# Patient Record
Sex: MALE | Race: BLACK OR AFRICAN AMERICAN | NOT HISPANIC OR LATINO | Employment: UNEMPLOYED | URBAN - METROPOLITAN AREA
[De-identification: names, ages, dates, MRNs, and addresses within clinical notes are randomized per-mention and may not be internally consistent; named-entity substitution may affect disease eponyms.]

---

## 2019-01-18 ENCOUNTER — HOSPITAL ENCOUNTER (OUTPATIENT)
Dept: INTERVENTIONAL RADIOLOGY/VASCULAR | Facility: HOSPITAL | Age: 44
Discharge: HOME | End: 2019-01-18
Admitting: RADIOLOGY
Payer: COMMERCIAL

## 2019-01-18 VITALS
RESPIRATION RATE: 20 BRPM | DIASTOLIC BLOOD PRESSURE: 95 MMHG | OXYGEN SATURATION: 96 % | HEART RATE: 87 BPM | SYSTOLIC BLOOD PRESSURE: 173 MMHG

## 2019-01-18 DIAGNOSIS — T82.858A AV FISTULA STENOSIS, INITIAL ENCOUNTER (HCC): ICD-10-CM

## 2019-01-18 PROCEDURE — C1894 INTRO/SHEATH, NON-LASER: HCPCS

## 2019-01-18 PROCEDURE — C1769 GUIDE WIRE: HCPCS

## 2019-01-18 PROCEDURE — C1725 CATH, TRANSLUMIN NON-LASER: HCPCS

## 2019-01-18 PROCEDURE — 36902 INTRO CATH DIALYSIS CIRCUIT: CPT

## 2019-01-18 PROCEDURE — 36902 INTRO CATH DIALYSIS CIRCUIT: CPT | Performed by: RADIOLOGY

## 2019-01-18 RX ORDER — LIDOCAINE HYDROCHLORIDE 10 MG/ML
INJECTION, SOLUTION INFILTRATION; PERINEURAL CODE/TRAUMA/SEDATION MEDICATION
Status: COMPLETED | OUTPATIENT
Start: 2019-01-18 | End: 2019-01-18

## 2019-01-18 RX ADMIN — NITROGLYCERIN 500 MCG: 20 INJECTION INTRAVENOUS at 17:06

## 2019-01-18 RX ADMIN — IOHEXOL 60 ML: 300 INJECTION, SOLUTION INTRAVENOUS at 17:29

## 2019-01-18 RX ADMIN — LIDOCAINE HYDROCHLORIDE 3 ML: 10 INJECTION, SOLUTION INFILTRATION; PERINEURAL at 16:04

## 2019-01-18 NOTE — DISCHARGE INSTRUCTIONS
Fistulagram   WHAT YOU NEED TO KNOW:   Your arm or leg my  be sore, swollen, and bruised after the procedure  This is normal and should get better in a few days  DISCHARGE INSTRUCTIONS:     Contact Interventional Radiology at [phone] Yoseph PATIENTS: Contact Interventional Radiology at [phone]) Matt Garcia PATIENTS: Contact Interventional Radiology at [phone]) if:    · You have a fever or chills  · Your puncture site is red, swollen, or draining pus  · You have nausea or are vomiting  · Your skin is itchy, swollen, or you have a rash  · You cannot feel a thrill over your graft or fistula  · You have questions or concerns about your condition or care  Seek care immediately if:     · You have bleeding that does not stop after 10 minutes of holding firm, direct pressure over the puncture site  · Blood soaks through your bandage  · Your hand or foot closest to the graft or fistula feels cold, painful, or numb  · Your hand or foot closest to the graft or fistula is pale or blue  · You have trouble moving your arm or leg closest to the graft or fistula  · Your bruise suddenly gets bigger  Care for your wound as directed:  Remove the bandage in 4 to 6 hours or as         directed  Wash the area once a day with soap and water  Gently pat the area dry  Apply firm, steady pressure to the puncture site if it bleeds  Use a clean gauze or towel to hold pressure for 10 to 15 minutes  Call 911 if you cannot stop the bleeding or the bleeding gets heavier  Feel for a thrill once a day or as directed  Place your index and second finger over your fistula or graft as directed  You should feel a vibration  The vibration means that blood is flowing through your graft or fistula correctly  Rest your arm or leg as directed  Do not lift anything heavier than 5 pounds or do strenuous activity for 24 hours  Prevent damage to your graft or fistula    Do not wear tight-fitting clothing over your graft or fistula  Do not wear tight jewelry on the arm or leg with the graft or fistula  Tell healthcare providers not to do, IVs, blood draws, and blood pressure readings in the arm with your graft or fistula  Do not allow flu shots or vaccinations in your arm with your graft or fistula      Follow up with your healthcare provider as directed

## 2019-07-18 ENCOUNTER — TELEPHONE (OUTPATIENT)
Dept: NEPHROLOGY | Facility: CLINIC | Age: 44
End: 2019-07-18

## 2019-07-18 NOTE — TELEPHONE ENCOUNTER
Warren Bundy called from Dr Jen Collins office  [phone]  Pt established his care with him and Dr Sophy Nguyen is now the pt's new PCP  Warren Bundy was calling stated the pt moved locally from [place] and is trying to  be established with a Nephrologist and is requesting an appt with our office as a 3 day urgent  Rebecca Demetrice that the pt is already an established patient at the [facility] for a couple months now and there is a rotation of providers that round monthly at the units  Per Dr Fawn Connors, pt does not need to be seen in the office,  pt is to call the Dialysis unit with any issues or concerns  The nurse would page the on call provider; this month is Dr Juan M Calvillo and if need be Dr Fawn Connors because he is the medical director  Warren Bundy was grateful for the understanding of the process and stated the patient was not forthcoming and will make the patient aware to contact the [facility] [place] [id number] [facility] with any issues or concerns

## 2019-12-18 DIAGNOSIS — R91.8 LUNG INFILTRATE: Primary | ICD-10-CM

## 2019-12-19 ENCOUNTER — HOSPITAL ENCOUNTER (OUTPATIENT)
Dept: RADIOLOGY | Facility: HOSPITAL | Age: 44
Discharge: HOME | End: 2019-12-19
Payer: COMMERCIAL

## 2019-12-19 DIAGNOSIS — R91.8 LUNG INFILTRATE: ICD-10-CM

## 2019-12-19 PROCEDURE — 71046 X-RAY EXAM CHEST 2 VIEWS: CPT

## 2020-03-30 ENCOUNTER — APPOINTMENT (EMERGENCY)
Dept: RADIOLOGY | Facility: HOSPITAL | Age: 45
End: 2020-03-30
Payer: COMMERCIAL

## 2020-03-30 ENCOUNTER — HOSPITAL ENCOUNTER (EMERGENCY)
Facility: HOSPITAL | Age: 45
Discharge: HOME | End: 2020-03-30
Attending: EMERGENCY MEDICINE | Admitting: EMERGENCY MEDICINE
Payer: COMMERCIAL

## 2020-03-30 VITALS
SYSTOLIC BLOOD PRESSURE: 107 MMHG | HEART RATE: 74 BPM | TEMPERATURE: 97.6 F | RESPIRATION RATE: 22 BRPM | BODY MASS INDEX: 32.21 KG/M2 | WEIGHT: 225 LBS | DIASTOLIC BLOOD PRESSURE: 60 MMHG | HEIGHT: 70 IN | OXYGEN SATURATION: 97 %

## 2020-03-30 DIAGNOSIS — R07.89 CHEST WALL PAIN: Primary | ICD-10-CM

## 2020-03-30 DIAGNOSIS — R07.9 CHEST PAIN: ICD-10-CM

## 2020-03-30 LAB
ALBUMIN SERPL BCP-MCNC: 3.7 G/DL (ref 3.5–5)
ALP SERPL-CCNC: 567 U/L (ref 46–116)
ALT SERPL W P-5'-P-CCNC: 15 U/L (ref 12–78)
ANION GAP SERPL CALCULATED.3IONS-SCNC: 16 MMOL/L (ref 4–13)
AST SERPL W P-5'-P-CCNC: 12 U/L (ref 5–45)
ATRIAL RATE: 76 BPM
ATRIAL RATE: 82 BPM
BASOPHILS # BLD AUTO: 0.04 THOUSANDS/ΜL (ref 0–0.1)
BASOPHILS NFR BLD AUTO: 1 % (ref 0–1)
BILIRUB SERPL-MCNC: 0.3 MG/DL (ref 0.2–1)
BUN SERPL-MCNC: 83 MG/DL (ref 5–25)
CALCIUM SERPL-MCNC: 7.9 MG/DL (ref 8.3–10.1)
CHLORIDE SERPL-SCNC: 96 MMOL/L (ref 100–108)
CO2 SERPL-SCNC: 25 MMOL/L (ref 21–32)
CREAT SERPL-MCNC: 19.34 MG/DL (ref 0.6–1.3)
EOSINOPHIL # BLD AUTO: 0.26 THOUSAND/ΜL (ref 0–0.61)
EOSINOPHIL NFR BLD AUTO: 4 % (ref 0–6)
ERYTHROCYTE [DISTWIDTH] IN BLOOD BY AUTOMATED COUNT: 16.3 % (ref 11.6–15.1)
GFR SERPL CREATININE-BSD FRML MDRD: 3 ML/MIN/1.73SQ M
GLUCOSE SERPL-MCNC: 115 MG/DL (ref 65–140)
HCT VFR BLD AUTO: 34.5 % (ref 36.5–49.3)
HGB BLD-MCNC: 10.8 G/DL (ref 12–17)
IMM GRANULOCYTES # BLD AUTO: 0.02 THOUSAND/UL (ref 0–0.2)
IMM GRANULOCYTES NFR BLD AUTO: 0 % (ref 0–2)
LIPASE SERPL-CCNC: 341 U/L (ref 73–393)
LYMPHOCYTES # BLD AUTO: 1.72 THOUSANDS/ΜL (ref 0.6–4.47)
LYMPHOCYTES NFR BLD AUTO: 27 % (ref 14–44)
MCH RBC QN AUTO: 27.3 PG (ref 26.8–34.3)
MCHC RBC AUTO-ENTMCNC: 31.3 G/DL (ref 31.4–37.4)
MCV RBC AUTO: 87 FL (ref 82–98)
MONOCYTES # BLD AUTO: 0.43 THOUSAND/ΜL (ref 0.17–1.22)
MONOCYTES NFR BLD AUTO: 7 % (ref 4–12)
NEUTROPHILS # BLD AUTO: 3.91 THOUSANDS/ΜL (ref 1.85–7.62)
NEUTS SEG NFR BLD AUTO: 61 % (ref 43–75)
NRBC BLD AUTO-RTO: 0 /100 WBCS
P AXIS: 63 DEGREES
P AXIS: 64 DEGREES
PLATELET # BLD AUTO: 199 THOUSANDS/UL (ref 149–390)
PMV BLD AUTO: 10.4 FL (ref 8.9–12.7)
POTASSIUM SERPL-SCNC: 6 MMOL/L (ref 3.5–5.3)
PR INTERVAL: 192 MS
PR INTERVAL: 206 MS
PROT SERPL-MCNC: 8.4 G/DL (ref 6.4–8.2)
QRS AXIS: 19 DEGREES
QRS AXIS: 9 DEGREES
QRSD INTERVAL: 86 MS
QRSD INTERVAL: 90 MS
QT INTERVAL: 398 MS
QT INTERVAL: 416 MS
QTC INTERVAL: 464 MS
QTC INTERVAL: 468 MS
RBC # BLD AUTO: 3.96 MILLION/UL (ref 3.88–5.62)
SODIUM SERPL-SCNC: 137 MMOL/L (ref 136–145)
T WAVE AXIS: 65 DEGREES
T WAVE AXIS: 70 DEGREES
TROPONIN I SERPL-MCNC: <0.02 NG/ML
TROPONIN I SERPL-MCNC: <0.02 NG/ML
VENTRICULAR RATE: 76 BPM
VENTRICULAR RATE: 82 BPM
WBC # BLD AUTO: 6.38 THOUSAND/UL (ref 4.31–10.16)

## 2020-03-30 PROCEDURE — 85025 COMPLETE CBC W/AUTO DIFF WBC: CPT | Performed by: EMERGENCY MEDICINE

## 2020-03-30 PROCEDURE — 93010 ELECTROCARDIOGRAM REPORT: CPT | Performed by: INTERNAL MEDICINE

## 2020-03-30 PROCEDURE — 71046 X-RAY EXAM CHEST 2 VIEWS: CPT

## 2020-03-30 PROCEDURE — 99282 EMERGENCY DEPT VISIT SF MDM: CPT | Performed by: EMERGENCY MEDICINE

## 2020-03-30 PROCEDURE — 84484 ASSAY OF TROPONIN QUANT: CPT | Performed by: EMERGENCY MEDICINE

## 2020-03-30 PROCEDURE — 36415 COLL VENOUS BLD VENIPUNCTURE: CPT | Performed by: EMERGENCY MEDICINE

## 2020-03-30 PROCEDURE — 80053 COMPREHEN METABOLIC PANEL: CPT | Performed by: EMERGENCY MEDICINE

## 2020-03-30 PROCEDURE — 93005 ELECTROCARDIOGRAM TRACING: CPT

## 2020-03-30 PROCEDURE — 83690 ASSAY OF LIPASE: CPT | Performed by: EMERGENCY MEDICINE

## 2020-03-30 PROCEDURE — 99285 EMERGENCY DEPT VISIT HI MDM: CPT

## 2020-03-30 RX ORDER — ASPIRIN 81 MG/1
324 TABLET, CHEWABLE ORAL ONCE
Status: COMPLETED | OUTPATIENT
Start: 2020-03-30 | End: 2020-03-30

## 2020-03-30 RX ORDER — SODIUM CHLORIDE 9 MG/ML
3 INJECTION INTRAVENOUS
Status: DISCONTINUED | OUTPATIENT
Start: 2020-03-30 | End: 2020-03-30 | Stop reason: HOSPADM

## 2020-03-30 RX ADMIN — ASPIRIN 81 MG 324 MG: 81 TABLET ORAL at 10:01

## 2020-03-30 NOTE — ED PROVIDER NOTES
History  Chief Complaint   Patient presents with    Chest Pain     pt with mid sternal chest pain that started last night, pain does not radiate, worse when laying on back      Patient is a 55-year-old male complaining of midsternal chest tightness since last evening  States symptoms occurred while at rest   No exertional component to the discomfort  Described as a tightness in his mid chest region  Does not radiate  Denies any shortness of breath, headache, fever, chills, nausea, vomiting, diaphoresis  No history of similar symptoms  Patient does home hemodialysis  No recent change in medications  No recent illness or trauma  Denies any palpitations  No history of DVT or PE  No recent surgery or periods of immobilization  No recent antibiotics  None       Past Medical History:   Diagnosis Date    Renal disorder     dialysis        Past Surgical History:   Procedure Laterality Date    IR FISTULA/GRAFTOGRAM  1/18/2019       History reviewed  No pertinent family history  I have reviewed and agree with the history as documented  E-Cigarette/Vaping    E-Cigarette Use Never User      E-Cigarette/Vaping Substances     Social History     Tobacco Use    Smoking status: Never Smoker    Smokeless tobacco: Never Used   Substance Use Topics    Alcohol use: Never     Frequency: Never    Drug use: Never       Review of Systems   Constitutional: Negative for activity change, appetite change, chills and fever  HENT: Negative for hearing loss, rhinorrhea, sneezing, sore throat and trouble swallowing  Eyes: Negative for visual disturbance  Respiratory: Positive for chest tightness  Negative for cough, choking, shortness of breath, wheezing and stridor  Cardiovascular: Negative for chest pain, palpitations and leg swelling  Gastrointestinal: Negative for abdominal pain, constipation, diarrhea, nausea and vomiting     Genitourinary: Negative for difficulty urinating, dysuria, frequency and testicular pain  Musculoskeletal: Negative for back pain, gait problem, joint swelling, myalgias, neck pain and neck stiffness  Skin: Negative for rash  Allergic/Immunologic: Negative for immunocompromised state  Neurological: Negative for dizziness, seizures, syncope, speech difficulty, weakness, light-headedness, numbness and headaches  All other systems reviewed and are negative  Physical Exam  Physical Exam   Constitutional: He is oriented to person, place, and time  He appears well-developed and well-nourished  He does not appear ill  No distress  HENT:   Head: Normocephalic and atraumatic  Mouth/Throat: Oropharynx is clear and moist    Eyes: Conjunctivae and EOM are normal    Neck: Normal range of motion  Neck supple  Cardiovascular: Normal rate, regular rhythm, intact distal pulses and normal pulses  No murmur heard  Pacemaker in the left upper chest wall   Pulmonary/Chest: Effort normal and breath sounds normal  No respiratory distress  He has no wheezes  He has no rales  He exhibits no tenderness  Abdominal: Soft  Bowel sounds are normal  He exhibits no distension and no mass  There is no tenderness  There is no rebound and no guarding  Musculoskeletal: Normal range of motion  He exhibits no edema or tenderness  Right lower leg: Normal         Left lower leg: Normal    Right upper extremity AV fistula with positive thrill and bruit  Lymphadenopathy:     He has no cervical adenopathy  Neurological: He is alert and oriented to person, place, and time  He exhibits normal muscle tone  Skin: Skin is warm and dry  Capillary refill takes less than 2 seconds  No rash noted  No erythema  No zoster rash  No reproducible tenderness  Psychiatric: He has a normal mood and affect  His behavior is normal    Nursing note and vitals reviewed        Vital Signs  ED Triage Vitals [03/30/20 0920]   Temperature Pulse Respirations Blood Pressure SpO2   97 6 °F (36 4 °C) 80 16 118/70 100 %      Temp Source Heart Rate Source Patient Position - Orthostatic VS BP Location FiO2 (%)   Oral Monitor Sitting Right arm --      Pain Score       --           Vitals:    03/30/20 0920 03/30/20 1207 03/30/20 1215   BP: 118/70 106/61 107/60   Pulse: 80 75 74   Patient Position - Orthostatic VS: Sitting Lying Sitting         Visual Acuity      ED Medications  Medications   sodium chloride (PF) 0 9 % injection 3 mL (has no administration in time range)   aspirin chewable tablet 324 mg (324 mg Oral Given 3/30/20 1001)       Diagnostic Studies  Results Reviewed     Procedure Component Value Units Date/Time    Troponin I [[id number]]  (Normal) Collected:  03/30/20 1228    Lab Status:  Final result Specimen:  Blood from Arm, Right Updated:  03/30/20 1255     Troponin I <0 02 ng/mL     Troponin I [[id number]]  (Normal) Collected:  03/30/20 0959    Lab Status:  Final result Specimen:  Blood from Arm, Left Updated:  03/30/20 1028     Troponin I <0 02 ng/mL     Comprehensive metabolic panel [[id number]]  (Abnormal) Collected:  03/30/20 0959    Lab Status:  Final result Specimen:  Blood from Arm, Left Updated:  03/30/20 1026     Sodium 137 mmol/L      Potassium 6 0 mmol/L      Chloride 96 mmol/L      CO2 25 mmol/L      ANION GAP 16 mmol/L      BUN 83 mg/dL      Creatinine 19 34 mg/dL      Glucose 115 mg/dL      Calcium 7 9 mg/dL      AST 12 U/L      ALT 15 U/L      Alkaline Phosphatase 567 U/L      Total Protein 8 4 g/dL      Albumin 3 7 g/dL      Total Bilirubin 0 30 mg/dL      eGFR 3 ml/min/1 73sq m     Narrative:       [place]unn[place] guidelines for Chronic Kidney Disease (CKD):     Stage 1 with normal or high GFR (GFR > 90 mL/min/1 73 square meters)    Stage 2 Mild CKD (GFR = 60-89 mL/min/1 73 square meters)    Stage 3A Moderate CKD (GFR = 45-59 mL/min/1 73 square meters)    Stage 3B Moderate CKD (GFR = 30-44 mL/min/1 73 square meters)    Stage 4 Severe CKD (GFR = 15-29 mL/min/1 73 square meters)    Stage 5 End Stage CKD (GFR <15 mL/min/1 73 square meters)  Note: GFR calculation is accurate only with a steady state creatinine    Lipase [[id number]]  (Normal) Collected:  03/30/20 0959    Lab Status:  Final result Specimen:  Blood from Arm, Left Updated:  03/30/20 1026     Lipase 341 u/L     CBC and differential [[id number]]  (Abnormal) Collected:  03/30/20 0959    Lab Status:  Final result Specimen:  Blood from Arm, Left Updated:  03/30/20 1008     WBC 6 38 Thousand/uL      RBC 3 96 Million/uL      Hemoglobin 10 8 g/dL      Hematocrit 34 5 %      MCV 87 fL      MCH 27 3 pg      MCHC 31 3 g/dL      RDW 16 3 %      MPV 10 4 fL      Platelets 531 Thousands/uL      nRBC 0 /100 WBCs      Neutrophils Relative 61 %      Immat GRANS % 0 %      Lymphocytes Relative 27 %      Monocytes Relative 7 %      Eosinophils Relative 4 %      Basophils Relative 1 %      Neutrophils Absolute 3 91 Thousands/µL      Immature Grans Absolute 0 02 Thousand/uL      Lymphocytes Absolute 1 72 Thousands/µL      Monocytes Absolute 0 43 Thousand/µL      Eosinophils Absolute 0 26 Thousand/µL      Basophils Absolute 0 04 Thousands/µL                  X-ray chest 2 views   Final Result by Abi Orellana MD (03/30 1230)      No acute cardiopulmonary disease              Workstation performed: [id number]                    Procedures  ECG 12 Lead Documentation Only  Date/Time: 3/30/2020 9:37 AM  Performed by: Sylvia Weller DO  Authorized by: Sylvia Weller DO     Indications / Diagnosis:  Chest pain  ECG reviewed by me, the ED Provider: yes    Patient location:  ED  Rate:     ECG rate:  82    ECG rate assessment: normal    Rhythm:     Rhythm: sinus rhythm    Ectopy:     Ectopy: none    QRS:     QRS axis:  Normal    QRS intervals:  Normal  Conduction:     Conduction: normal    ST segments:     ST segments:  Normal  T waves:     T waves: normal      ECG 12 Lead Documentation Only  Date/Time: 3/30/2020 12:14 PM  Performed by: Flavio Jiménez DO Jeffry  Authorized by: Richa Guerrero DO     Indications / Diagnosis:  Chest pain  ECG reviewed by me, the ED Provider: yes    Patient location:  ED  Previous ECG:     Previous ECG:  Compared to current    Comparison to cardiac monitor: Yes    Rate:     ECG rate:  76    ECG rate assessment: normal    Rhythm:     Rhythm: sinus rhythm    Ectopy:     Ectopy: none    QRS:     QRS axis:  Normal    QRS intervals:  Normal  Conduction:     Conduction: normal    ST segments:     ST segments:  Normal  T waves:     T waves: normal               ED Course         HEART Risk Score      Most Recent Value   Heart Score Risk Calculator   History  0 Filed at: 03/30/2020 1055   ECG  0 Filed at: 03/30/2020 1055   Age  0 Filed at: 03/30/2020 1055   Risk Factors  1 Filed at: 03/30/2020 1055   Troponin  0 Filed at: 03/30/2020 1055   HEART Score  1 Filed at: 03/30/2020 1055                              MDM      Disposition  Final diagnoses:   Chest wall pain   Chest pain     Time reflects when diagnosis was documented in both MDM as applicable and the Disposition within this note     Time User Action Codes Description Comment    3/30/2020  1:18 PM Jessica Mercury T Add [R07 89] Chest wall pain     3/30/2020  1:18 PM Jessica Mercury T Add [R07 9] Chest pain       ED Disposition     ED Disposition Condition Date/Time Comment    Discharge Stable Mon Mar 30, 2020  1:18 PM Khai Laboy discharge to home/self care  Follow-up Information     Follow up With Specialties Details Why Contact Info Additional [facility]   [address] [address]  [place] [address] [facility] [address] [place] [address],[address] [place], [place], [address] [phone]          Patient's Medications    No medications on file     No discharge procedures on file      PDMP Review     None          ED Provider  Electronically Signed by           Basilio Anne,   03/30/20 1180

## 2020-06-15 ENCOUNTER — OFFICE VISIT (OUTPATIENT)
Dept: GASTROENTEROLOGY | Facility: CLINIC | Age: 45
End: 2020-06-15
Payer: COMMERCIAL

## 2020-06-15 VITALS
TEMPERATURE: 97.9 F | DIASTOLIC BLOOD PRESSURE: 88 MMHG | SYSTOLIC BLOOD PRESSURE: 166 MMHG | WEIGHT: 239 LBS | BODY MASS INDEX: 34.29 KG/M2

## 2020-06-15 DIAGNOSIS — Z12.11 SCREENING FOR COLON CANCER: Primary | ICD-10-CM

## 2020-06-15 DIAGNOSIS — Z11.59 SPECIAL SCREENING EXAMINATION FOR UNSPECIFIED VIRAL DISEASE: ICD-10-CM

## 2020-06-15 PROCEDURE — 99203 OFFICE O/P NEW LOW 30 MIN: CPT | Performed by: PHYSICIAN ASSISTANT

## 2020-06-15 RX ORDER — HYDRALAZINE HYDROCHLORIDE 50 MG/1
50 TABLET, FILM COATED ORAL
COMMUNITY
Start: 2019-04-24 | End: 2020-07-06 | Stop reason: HOSPADM

## 2020-06-15 RX ORDER — CALCITRIOL 0.5 UG/1
0.5 CAPSULE, LIQUID FILLED ORAL
COMMUNITY
Start: 2016-05-04 | End: 2020-07-06 | Stop reason: HOSPADM

## 2020-06-15 RX ORDER — NIFEDIPINE 60 MG/1
90 TABLET, FILM COATED, EXTENDED RELEASE ORAL
COMMUNITY
Start: 2018-01-19 | End: 2020-07-06 | Stop reason: HOSPADM

## 2020-06-15 RX ORDER — NIFEDIPINE 90 MG/1
90 TABLET, EXTENDED RELEASE ORAL DAILY
COMMUNITY
Start: 2020-05-05 | End: 2021-10-25

## 2020-06-20 DIAGNOSIS — Z11.59 SPECIAL SCREENING EXAMINATION FOR UNSPECIFIED VIRAL DISEASE: ICD-10-CM

## 2020-06-20 PROCEDURE — U0003 INFECTIOUS AGENT DETECTION BY NUCLEIC ACID (DNA OR RNA); SEVERE ACUTE RESPIRATORY SYNDROME CORONAVIRUS 2 (SARS-COV-2) (CORONAVIRUS DISEASE [COVID-19]), AMPLIFIED PROBE TECHNIQUE, MAKING USE OF HIGH THROUGHPUT TECHNOLOGIES AS DESCRIBED BY CMS-2020-01-R: HCPCS

## 2020-06-21 LAB — SARS-COV-2 RNA SPEC QL NAA+PROBE: NOT DETECTED

## 2020-06-23 ENCOUNTER — ANESTHESIA EVENT (OUTPATIENT)
Dept: GASTROENTEROLOGY | Facility: HOSPITAL | Age: 45
End: 2020-06-23

## 2020-06-24 ENCOUNTER — ANESTHESIA (OUTPATIENT)
Dept: GASTROENTEROLOGY | Facility: HOSPITAL | Age: 45
End: 2020-06-24

## 2020-06-24 ENCOUNTER — HOSPITAL ENCOUNTER (OUTPATIENT)
Dept: GASTROENTEROLOGY | Facility: HOSPITAL | Age: 45
Setting detail: OUTPATIENT SURGERY
Discharge: HOME | End: 2020-06-24
Attending: INTERNAL MEDICINE
Payer: COMMERCIAL

## 2020-06-24 VITALS
SYSTOLIC BLOOD PRESSURE: 157 MMHG | OXYGEN SATURATION: 97 % | BODY MASS INDEX: 33.2 KG/M2 | HEART RATE: 98 BPM | RESPIRATION RATE: 15 BRPM | TEMPERATURE: 97.4 F | WEIGHT: 231.92 LBS | DIASTOLIC BLOOD PRESSURE: 91 MMHG | HEIGHT: 70 IN

## 2020-06-24 DIAGNOSIS — Z12.11 SCREENING FOR COLON CANCER: ICD-10-CM

## 2020-06-24 LAB — GLUCOSE SERPL-MCNC: 84 MG/DL (ref 65–140)

## 2020-06-24 PROCEDURE — 45384 COLONOSCOPY W/LESION REMOVAL: CPT | Performed by: INTERNAL MEDICINE

## 2020-06-24 PROCEDURE — 45385 COLONOSCOPY W/LESION REMOVAL: CPT | Performed by: INTERNAL MEDICINE

## 2020-06-24 PROCEDURE — 82948 REAGENT STRIP/BLOOD GLUCOSE: CPT

## 2020-06-24 PROCEDURE — 88305 TISSUE EXAM BY PATHOLOGIST: CPT | Performed by: PATHOLOGY

## 2020-06-24 RX ORDER — LIDOCAINE HYDROCHLORIDE 10 MG/ML
INJECTION, SOLUTION EPIDURAL; INFILTRATION; INTRACAUDAL; PERINEURAL AS NEEDED
Status: DISCONTINUED | OUTPATIENT
Start: 2020-06-24 | End: 2020-06-24 | Stop reason: SURG

## 2020-06-24 RX ORDER — PROPOFOL 10 MG/ML
INJECTION, EMULSION INTRAVENOUS AS NEEDED
Status: DISCONTINUED | OUTPATIENT
Start: 2020-06-24 | End: 2020-06-24 | Stop reason: SURG

## 2020-06-24 RX ORDER — SODIUM CHLORIDE, SODIUM LACTATE, POTASSIUM CHLORIDE, CALCIUM CHLORIDE 600; 310; 30; 20 MG/100ML; MG/100ML; MG/100ML; MG/100ML
125 INJECTION, SOLUTION INTRAVENOUS CONTINUOUS
Status: DISCONTINUED | OUTPATIENT
Start: 2020-06-24 | End: 2020-06-28 | Stop reason: HOSPADM

## 2020-06-24 RX ADMIN — PROPOFOL 20 MG: 10 INJECTION, EMULSION INTRAVENOUS at 09:32

## 2020-06-24 RX ADMIN — PROPOFOL 20 MG: 10 INJECTION, EMULSION INTRAVENOUS at 09:38

## 2020-06-24 RX ADMIN — LIDOCAINE HYDROCHLORIDE 50 MG: 10 INJECTION, SOLUTION EPIDURAL; INFILTRATION; INTRACAUDAL; PERINEURAL at 09:26

## 2020-06-24 RX ADMIN — PROPOFOL 10 MG: 10 INJECTION, EMULSION INTRAVENOUS at 09:42

## 2020-06-24 RX ADMIN — PROPOFOL 100 MG: 10 INJECTION, EMULSION INTRAVENOUS at 09:26

## 2020-06-24 RX ADMIN — PROPOFOL 20 MG: 10 INJECTION, EMULSION INTRAVENOUS at 09:29

## 2020-06-24 RX ADMIN — PROPOFOL 20 MG: 10 INJECTION, EMULSION INTRAVENOUS at 09:35

## 2020-06-24 RX ADMIN — SODIUM CHLORIDE, SODIUM LACTATE, POTASSIUM CHLORIDE, AND CALCIUM CHLORIDE: .6; .31; .03; .02 INJECTION, SOLUTION INTRAVENOUS at 08:54

## 2020-07-02 PROCEDURE — 93005 ELECTROCARDIOGRAM TRACING: CPT

## 2020-07-02 PROCEDURE — 99285 EMERGENCY DEPT VISIT HI MDM: CPT

## 2020-07-03 ENCOUNTER — APPOINTMENT (EMERGENCY)
Dept: CT IMAGING | Facility: HOSPITAL | Age: 45
DRG: 194 | End: 2020-07-03
Payer: COMMERCIAL

## 2020-07-03 ENCOUNTER — APPOINTMENT (EMERGENCY)
Dept: RADIOLOGY | Facility: HOSPITAL | Age: 45
DRG: 194 | End: 2020-07-03
Payer: COMMERCIAL

## 2020-07-03 ENCOUNTER — APPOINTMENT (OUTPATIENT)
Dept: DIALYSIS | Facility: HOSPITAL | Age: 45
DRG: 194 | End: 2020-07-03
Payer: COMMERCIAL

## 2020-07-03 ENCOUNTER — HOSPITAL ENCOUNTER (INPATIENT)
Facility: HOSPITAL | Age: 45
LOS: 3 days | Discharge: HOME HEALTH | DRG: 194 | End: 2020-07-06
Attending: EMERGENCY MEDICINE | Admitting: STUDENT IN AN ORGANIZED HEALTH CARE EDUCATION/TRAINING PROGRAM
Payer: COMMERCIAL

## 2020-07-03 DIAGNOSIS — J18.9 PNEUMONIA: ICD-10-CM

## 2020-07-03 DIAGNOSIS — I50.9 CHF (CONGESTIVE HEART FAILURE) (HCC): Primary | ICD-10-CM

## 2020-07-03 DIAGNOSIS — I10 HYPERTENSION: ICD-10-CM

## 2020-07-03 LAB
ALBUMIN SERPL BCP-MCNC: 3.6 G/DL (ref 3.5–5)
ALP SERPL-CCNC: 433 U/L (ref 46–116)
ALT SERPL W P-5'-P-CCNC: 10 U/L (ref 12–78)
ANION GAP SERPL CALCULATED.3IONS-SCNC: 13 MMOL/L (ref 4–13)
AST SERPL W P-5'-P-CCNC: 9 U/L (ref 5–45)
ATRIAL RATE: 98 BPM
BASOPHILS # BLD AUTO: 0.04 THOUSANDS/ΜL (ref 0–0.1)
BASOPHILS NFR BLD AUTO: 1 % (ref 0–1)
BILIRUB SERPL-MCNC: 0.6 MG/DL (ref 0.2–1)
BUN SERPL-MCNC: 45 MG/DL (ref 5–25)
CALCIUM SERPL-MCNC: 9.7 MG/DL (ref 8.3–10.1)
CHLORIDE SERPL-SCNC: 97 MMOL/L (ref 100–108)
CO2 SERPL-SCNC: 28 MMOL/L (ref 21–32)
CREAT SERPL-MCNC: 12.65 MG/DL (ref 0.6–1.3)
EOSINOPHIL # BLD AUTO: 0.23 THOUSAND/ΜL (ref 0–0.61)
EOSINOPHIL NFR BLD AUTO: 3 % (ref 0–6)
ERYTHROCYTE [DISTWIDTH] IN BLOOD BY AUTOMATED COUNT: 16.5 % (ref 11.6–15.1)
FLUAV RNA NPH QL NAA+PROBE: NORMAL
FLUBV RNA NPH QL NAA+PROBE: NORMAL
GFR SERPL CREATININE-BSD FRML MDRD: 5 ML/MIN/1.73SQ M
GLUCOSE SERPL-MCNC: 120 MG/DL (ref 65–140)
HCT VFR BLD AUTO: 24.3 % (ref 36.5–49.3)
HGB BLD-MCNC: 7.6 G/DL (ref 12–17)
IMM GRANULOCYTES # BLD AUTO: 0.03 THOUSAND/UL (ref 0–0.2)
IMM GRANULOCYTES NFR BLD AUTO: 0 % (ref 0–2)
LACTATE SERPL-SCNC: 1 MMOL/L (ref 0.5–2)
LYMPHOCYTES # BLD AUTO: 1.59 THOUSANDS/ΜL (ref 0.6–4.47)
LYMPHOCYTES NFR BLD AUTO: 22 % (ref 14–44)
MCH RBC QN AUTO: 26 PG (ref 26.8–34.3)
MCHC RBC AUTO-ENTMCNC: 31.3 G/DL (ref 31.4–37.4)
MCV RBC AUTO: 83 FL (ref 82–98)
MONOCYTES # BLD AUTO: 0.52 THOUSAND/ΜL (ref 0.17–1.22)
MONOCYTES NFR BLD AUTO: 7 % (ref 4–12)
NEUTROPHILS # BLD AUTO: 4.83 THOUSANDS/ΜL (ref 1.85–7.62)
NEUTS SEG NFR BLD AUTO: 67 % (ref 43–75)
NRBC BLD AUTO-RTO: 0 /100 WBCS
NT-PROBNP SERPL-MCNC: 7868 PG/ML
P AXIS: 60 DEGREES
PLATELET # BLD AUTO: 207 THOUSANDS/UL (ref 149–390)
PMV BLD AUTO: 9.8 FL (ref 8.9–12.7)
POTASSIUM SERPL-SCNC: 3.5 MMOL/L (ref 3.5–5.3)
PR INTERVAL: 184 MS
PROCALCITONIN SERPL-MCNC: 3.44 NG/ML
PROT SERPL-MCNC: 8.6 G/DL (ref 6.4–8.2)
QRS AXIS: 22 DEGREES
QRSD INTERVAL: 90 MS
QT INTERVAL: 348 MS
QTC INTERVAL: 444 MS
RBC # BLD AUTO: 2.92 MILLION/UL (ref 3.88–5.62)
RSV RNA NPH QL NAA+PROBE: NORMAL
SARS-COV-2 RNA RESP QL NAA+PROBE: NEGATIVE
SODIUM SERPL-SCNC: 138 MMOL/L (ref 136–145)
T WAVE AXIS: 59 DEGREES
TROPONIN I SERPL-MCNC: <0.02 NG/ML
VENTRICULAR RATE: 98 BPM
WBC # BLD AUTO: 7.24 THOUSAND/UL (ref 4.31–10.16)

## 2020-07-03 PROCEDURE — 84484 ASSAY OF TROPONIN QUANT: CPT | Performed by: EMERGENCY MEDICINE

## 2020-07-03 PROCEDURE — 80053 COMPREHEN METABOLIC PANEL: CPT | Performed by: EMERGENCY MEDICINE

## 2020-07-03 PROCEDURE — 36415 COLL VENOUS BLD VENIPUNCTURE: CPT | Performed by: EMERGENCY MEDICINE

## 2020-07-03 PROCEDURE — 5A1D70Z PERFORMANCE OF URINARY FILTRATION, INTERMITTENT, LESS THAN 6 HOURS PER DAY: ICD-10-PCS | Performed by: INTERNAL MEDICINE

## 2020-07-03 PROCEDURE — 71045 X-RAY EXAM CHEST 1 VIEW: CPT

## 2020-07-03 PROCEDURE — 84145 PROCALCITONIN (PCT): CPT | Performed by: INTERNAL MEDICINE

## 2020-07-03 PROCEDURE — 71250 CT THORAX DX C-: CPT

## 2020-07-03 PROCEDURE — 83605 ASSAY OF LACTIC ACID: CPT | Performed by: EMERGENCY MEDICINE

## 2020-07-03 PROCEDURE — 87631 RESP VIRUS 3-5 TARGETS: CPT | Performed by: STUDENT IN AN ORGANIZED HEALTH CARE EDUCATION/TRAINING PROGRAM

## 2020-07-03 PROCEDURE — 90935 HEMODIALYSIS ONE EVALUATION: CPT

## 2020-07-03 PROCEDURE — 99223 1ST HOSP IP/OBS HIGH 75: CPT | Performed by: INTERNAL MEDICINE

## 2020-07-03 PROCEDURE — 85025 COMPLETE CBC W/AUTO DIFF WBC: CPT | Performed by: EMERGENCY MEDICINE

## 2020-07-03 PROCEDURE — 87040 BLOOD CULTURE FOR BACTERIA: CPT | Performed by: EMERGENCY MEDICINE

## 2020-07-03 PROCEDURE — 96365 THER/PROPH/DIAG IV INF INIT: CPT

## 2020-07-03 PROCEDURE — 94664 DEMO&/EVAL PT USE INHALER: CPT

## 2020-07-03 PROCEDURE — 99285 EMERGENCY DEPT VISIT HI MDM: CPT | Performed by: EMERGENCY MEDICINE

## 2020-07-03 PROCEDURE — 83880 ASSAY OF NATRIURETIC PEPTIDE: CPT | Performed by: EMERGENCY MEDICINE

## 2020-07-03 PROCEDURE — 94760 N-INVAS EAR/PLS OXIMETRY 1: CPT

## 2020-07-03 PROCEDURE — 87635 SARS-COV-2 COVID-19 AMP PRB: CPT | Performed by: EMERGENCY MEDICINE

## 2020-07-03 PROCEDURE — 93010 ELECTROCARDIOGRAM REPORT: CPT | Performed by: INTERNAL MEDICINE

## 2020-07-03 RX ORDER — OXYCODONE HYDROCHLORIDE 10 MG/1
10 TABLET ORAL EVERY 4 HOURS PRN
Status: DISCONTINUED | OUTPATIENT
Start: 2020-07-03 | End: 2020-07-06 | Stop reason: HOSPADM

## 2020-07-03 RX ORDER — HYDROMORPHONE HCL/PF 1 MG/ML
1 SYRINGE (ML) INJECTION EVERY 4 HOURS PRN
Status: DISCONTINUED | OUTPATIENT
Start: 2020-07-03 | End: 2020-07-06 | Stop reason: HOSPADM

## 2020-07-03 RX ORDER — ACETAMINOPHEN 325 MG/1
650 TABLET ORAL ONCE
Status: COMPLETED | OUTPATIENT
Start: 2020-07-03 | End: 2020-07-03

## 2020-07-03 RX ORDER — CARVEDILOL 25 MG/1
25 TABLET ORAL 2 TIMES DAILY WITH MEALS
COMMUNITY
End: 2020-07-06 | Stop reason: HOSPADM

## 2020-07-03 RX ORDER — NIFEDIPINE 30 MG/1
90 TABLET, EXTENDED RELEASE ORAL DAILY
Status: DISCONTINUED | OUTPATIENT
Start: 2020-07-03 | End: 2020-07-06 | Stop reason: HOSPADM

## 2020-07-03 RX ORDER — OXYCODONE HYDROCHLORIDE 5 MG/1
5 TABLET ORAL EVERY 4 HOURS PRN
Status: DISCONTINUED | OUTPATIENT
Start: 2020-07-03 | End: 2020-07-06 | Stop reason: HOSPADM

## 2020-07-03 RX ORDER — HEPARIN SODIUM 5000 [USP'U]/ML
5000 INJECTION, SOLUTION INTRAVENOUS; SUBCUTANEOUS EVERY 8 HOURS SCHEDULED
Status: DISCONTINUED | OUTPATIENT
Start: 2020-07-03 | End: 2020-07-06 | Stop reason: HOSPADM

## 2020-07-03 RX ORDER — CARVEDILOL 12.5 MG/1
25 TABLET ORAL 2 TIMES DAILY WITH MEALS
Status: DISCONTINUED | OUTPATIENT
Start: 2020-07-03 | End: 2020-07-06 | Stop reason: HOSPADM

## 2020-07-03 RX ORDER — HYDRALAZINE HYDROCHLORIDE 25 MG/1
50 TABLET, FILM COATED ORAL EVERY 8 HOURS SCHEDULED
Status: DISCONTINUED | OUTPATIENT
Start: 2020-07-03 | End: 2020-07-05

## 2020-07-03 RX ORDER — CALCITRIOL 0.25 UG/1
0.5 CAPSULE, LIQUID FILLED ORAL DAILY
Status: DISCONTINUED | OUTPATIENT
Start: 2020-07-03 | End: 2020-07-06 | Stop reason: HOSPADM

## 2020-07-03 RX ORDER — ACETAMINOPHEN 325 MG/1
650 TABLET ORAL EVERY 6 HOURS PRN
Status: DISCONTINUED | OUTPATIENT
Start: 2020-07-03 | End: 2020-07-06 | Stop reason: HOSPADM

## 2020-07-03 RX ADMIN — HEPARIN SODIUM 5000 UNITS: 5000 INJECTION INTRAVENOUS; SUBCUTANEOUS at 21:04

## 2020-07-03 RX ADMIN — HEPARIN SODIUM 5000 UNITS: 5000 INJECTION INTRAVENOUS; SUBCUTANEOUS at 11:19

## 2020-07-03 RX ADMIN — AZITHROMYCIN MONOHYDRATE 500 MG: 500 INJECTION, POWDER, LYOPHILIZED, FOR SOLUTION INTRAVENOUS at 02:16

## 2020-07-03 RX ADMIN — Medication 1000 MG: at 14:16

## 2020-07-03 RX ADMIN — CALCITRIOL CAPSULES 0.25 MCG 0.5 MCG: 0.25 CAPSULE ORAL at 11:19

## 2020-07-03 RX ADMIN — HYDRALAZINE HYDROCHLORIDE 50 MG: 25 TABLET ORAL at 17:00

## 2020-07-03 RX ADMIN — NIFEDIPINE 90 MG: 60 TABLET, FILM COATED, EXTENDED RELEASE ORAL at 11:19

## 2020-07-03 RX ADMIN — HEPARIN SODIUM 5000 UNITS: 5000 INJECTION INTRAVENOUS; SUBCUTANEOUS at 15:00

## 2020-07-03 RX ADMIN — ACETAMINOPHEN 650 MG: 325 TABLET, FILM COATED ORAL at 01:00

## 2020-07-03 RX ADMIN — CARVEDILOL 25 MG: 12.5 TABLET, FILM COATED ORAL at 17:00

## 2020-07-03 RX ADMIN — HYDRALAZINE HYDROCHLORIDE 50 MG: 25 TABLET ORAL at 21:04

## 2020-07-03 NOTE — QUICK NOTE
Called for patient c/o scrotal pain  Acute in onset    O/E: tenderness to palpation  Hurts more on lifting  No swelling  No discharge  No mass on testes      A/P:  Already on antibiotics ceftriaxone and azithromycin which will cover for bacterial infection  Will obtain US scrotum  If the pain worsens will obtain stat surgical consult for torsion which does not seem to be the case right now  No fever or leukocytosis or lactic acidosis  For now we will continue to cold compress and elevation    Discussed with Keyla Knapp RN

## 2020-07-03 NOTE — ED NOTES
Pt complains of SOB, worse when laying down flat  Denies CP  Denies n/v/d  Pt had full dialysis treatment today with no issues  Pt states it feels like he is retaining too much fluid  Placed on monitor       Barbara Paez RN  07/03/20 0523

## 2020-07-03 NOTE — CONSULTS
NEPHROLOGY CONSULTATION NOTE    Patient: Zaid Perez               Sex: male          DOA: 7/3/2020 12:04 AM   YOB: 1975        Age:  39 y o         LOS:  LOS: 0 days     REFERRING PHYSICIAN:  Dr Denita Alexander / CONSULTATION:  Shortness of breath/ESRD on hemodialysis    DATE OF CONSULTATION / SERVICE: 7/3/2020    ADMISSION DIAGNOSIS: Acute congestive heart failure ([address] )     CHIEF COMPLAINT     Shortness of breath    HPI     This is a 70-year-old male with past medical history of ESRD on home hemodialysis, hypertension, diabetes mellitus type 2, secondary hyperparathyroidism of renal origin who presents to our facility with shortness of breath and cough  Patient denies having fever though documentation of wife reporting fever at home as high as 102° F  Patient undergoes home hemodialysis for 4 days a week for approximately 2 hours and 30 minutes  States that his last session of hemodialysis was yesterday and a 2 L ultrafiltration was achieved  States that his dry weight is 103 kg and noted to have weight of 101 kg yesterday  Underwent CT scan of the chest without contrast which revealed diffuse airspace opacities representing infection versus edema  Patient currently requiring oxygen via nasal cannula and saturating at 91%  Currently patient denies nausea, vomiting, headache, dizziness, abdominal pain, constipation or rash      PAST MEDICAL HISTORY     Past Medical History:   Diagnosis Date    Chronic kidney disease     Hypertension     Renal disorder     dialysis        PAST SURGICAL HISTORY     Past Surgical History:   Procedure Laterality Date    IR FISTULA/GRAFTOGRAM  1/18/2019       ALLERGIES     No Known Allergies    SOCIAL HISTORY     Social History     Substance and Sexual Activity   Alcohol Use Never    Frequency: Never     Social History     Substance and Sexual Activity   Drug Use Never     Social History     Tobacco Use   Smoking Status Former Smoker    Last attempt to quit: 6/15/2010    Years since quitting: 10 0   Smokeless Tobacco Never Used       FAMILY HISTORY     History reviewed  No pertinent family history  CURRENT MEDICATIONS       Current Facility-Administered Medications:     acetaminophen (TYLENOL) tablet 650 mg, 650 mg, Oral, Q6H PRN, MD Alena Toth  [START ON 7/4/2020] azithromycin (ZITHROMAX) 500 mg in sodium chloride 0 9% 250mL IVPB 500 mg, 500 mg, Intravenous, Q24H, Peter Nevarez MD    carvedilol (COREG) tablet 25 mg, 25 mg, Oral, BID With Meals, Peter Nevarez MD    ceftriaxone (ROCEPHIN) 1 g/50 mL in dextrose IVPB, 1,000 mg, Intravenous, Q24H, Peter Nevarez MD    heparin (porcine) subcutaneous injection 5,000 Units, 5,000 Units, Subcutaneous, Q8H [address], Peter Nevarez MD    hydrALAZINE (APRESOLINE) tablet 50 mg, 50 mg, Oral, Q8H [address], Peter Nevarez MD    NIFEdipine (PROCARDIA XL) 24 hr tablet 90 mg, 90 mg, Oral, Daily, Peter Nevarez MD    Current Outpatient Medications:     calcitriol (ROCALTROL) 0 5 MCG capsule, Take 0 5 mcg by mouth, Disp: , Rfl:     carvedilol (COREG) 25 mg tablet, Take 25 mg by mouth 2 (two) times a day with meals, Disp: , Rfl:     NIFEdipine (PROCARDIA XL) 90 mg 24 hr tablet, Take 90 mg by mouth daily, Disp: , Rfl:     hydrALAZINE (APRESOLINE) 50 mg tablet, Take 50 mg by mouth, Disp: , Rfl:     NIFEdipine ER (ADALAT CC) 60 MG 24 hr tablet, Take 90 mg by mouth, Disp: , Rfl:     REVIEW OF SYSTEMS     Review of Systems   Constitutional: Negative  HENT: Negative  Eyes: Negative  Respiratory: Positive for cough and shortness of breath  Cardiovascular: Negative  Gastrointestinal: Negative  Endocrine: Negative  Genitourinary: Negative  Musculoskeletal: Negative  Skin: Negative  Allergic/Immunologic: Negative  Neurological: Negative  Hematological: Negative  All other systems reviewed and are negative          OBJECTIVE     Current Weight: Weight - Scale: 105 kg (231 lb)  Vitals:    07/03/20 0900   BP: (!) 172/88   Pulse: 90   Resp: 19   Temp:    SpO2: 95%     Body mass index is 33 15 kg/m²  Intake/Output Summary (Last 24 hours) at 7/3/2020 1020  Last data filed at 7/3/2020 0545  Gross per 24 hour   Intake 250 ml   Output    Net 250 ml       PHYSICAL EXAMINATION     Physical Exam   Constitutional: He is oriented to person, place, and time  HENT:   Head: Normocephalic and atraumatic  Eyes: Pupils are equal, round, and reactive to light  Neck: Neck supple  No JVD present  Cardiovascular: Normal rate, regular rhythm and normal heart sounds  Exam reveals no friction rub  No murmur heard  Pulmonary/Chest: Effort normal  He has rales  Abdominal: Soft  Bowel sounds are normal  He exhibits no distension  There is no tenderness  There is no rebound  Musculoskeletal: He exhibits no edema or tenderness  Neurological: He is alert and oriented to person, place, and time  Skin: Skin is dry  No rash noted  Psychiatric: He has a normal mood and affect  LAB RESULTS        Results from last 7 days   Lab Units 07/03/20  0022   WBC Thousand/uL 7 24   HEMOGLOBIN g/dL 7 6*   HEMATOCRIT % 24 3*   PLATELETS Thousands/uL 207   POTASSIUM mmol/L 3 5   CHLORIDE mmol/L 97*   CO2 mmol/L 28   BUN mg/dL 45*   CREATININE mg/dL 12 65*   EGFR ml/min/1 73sq m 5   CALCIUM mg/dL 9 7           RADIOLOGY RESULTS     No results found for this or any previous visit  Results for orders placed during the hospital encounter of 03/30/20   X-ray chest 2 views    Narrative CHEST     INDICATION:   chest pain    COMPARISON: Chest radiograph from 12/19/2019  EXAM PERFORMED/VIEWS:  XR CHEST PA & LATERAL WITH DUAL ENERGY SUBTRACTION  FINDINGS:    Cardiomediastinal silhouette is normal  Left subclavian ICD lead in right ventricular apex  Lungs are clear  No effusion or pneumothorax  Osseous structures are within normal limits for age        Impression No acute cardiopulmonary disease  Workstation performed: [id number]       Results for orders placed during the hospital encounter of 07/03/20   CT chest without contrast    Narrative CT CHEST WITHOUT IV CONTRAST    INDICATION:   sob  COMPARISON:  None  TECHNIQUE: CT examination of the chest was performed without intravenous contrast   Axial, sagittal, and coronal 2D reformatted images were created from the source data and submitted for interpretation  Radiation dose length product (DLP) for this visit:  278 mGy-cm   This examination, like all CT scans performed in the [facility], was performed utilizing techniques to minimize radiation dose exposure, including the use of iterative   reconstruction and automated exposure control  FINDINGS:    LUNGS:  The trachea and central bronchial tree are patent  Diffuse airspace opacities are seen throughout the lungs (left greater than right)  PLEURA:  Trace left-sided pleural effusion is seen  HEART/GREAT VESSELS:  The heart is enlarged  Trace pericardial effusion is seen  MEDIASTINUM AND MAXIMILIAN:  Mediastinal lymph nodes measuring up to 1 3 cm are visualized  Limited evaluation the hilar region due to lack of intravenous contrast     CHEST WALL AND LOWER NECK:   Left-sided central venous catheter is seen  VISUALIZED STRUCTURES IN THE UPPER ABDOMEN:  Unremarkable  OSSEOUS STRUCTURES:  Increased sclerosis of the osseous structures is seen  Impression Diffuse airspace opacities throughout the lungs which may represent infection versus pulmonary edema  Recommend short-term follow-up CT scan of the chest to evaluate for resolution in 3 months  The study was marked in EPIC for significant notification  Workstation performed: [id number]       No results found for this or any previous visit  No results found for this or any previous visit  No results found for this or any previous visit      PLAN / RECOMMENDATIONS      80-year-old male with past medical history of ESRD on home hemodialysis, hypertension, secondary hyperparathyroidism of renal origin who presents to our facility with shortness of breath and cough and found to be in probable volume overload    1  ESRD on hemodialysis:  Patient undergoes hemodialysis at home 4 days a week for 2 hours and 30 minutes   -last hemodialysis session was yesterday   -given probable volume overload and low oxygen saturation despite on oxygen via nasal cannula, will place him on 3 hours of hemodialysis with target ultrafiltration of 3 5 L     2  Shortness of breath:  Etiology likely secondary to volume overload  Plan for ultrafiltration as above  3  Anemia due to ESRD:  Hemoglobin of 7 6 and low  Will administer Epogen with dialysis  Will obtain iron studies as well  4  Hypokalemia:  Serum potassium 3 5 and low  Will dialyze with 3K bath  5  Hypertension due to ESRD:  Blood pressure is above target at 366 mmHg systolic  Currently on carvedilol, hydralazine and nifedipine  6  Secondary hyperparathyroidism of renal origin:  Will continue with calcitriol at home dose  Thank you for the consultation to participate in patient's care  I have personally discussed my plan with the referring physician       Carlos Hernandez MD    7/3/2020

## 2020-07-03 NOTE — RESPIRATORY THERAPY NOTE
RT Protocol Note  Kaylyn Ewing 39 y o  male MRN: [id number]  Unit/Bed#: -01 Encounter: [id number]    Assessment    Principal Problem:    Acute congestive heart failure ([address] )      Home Pulmonary Medications:  Protocol       Past Medical History:   Diagnosis Date    Chronic kidney disease     Hypertension     Renal disorder     dialysis      Social History     Socioeconomic History    Marital status: /Civil Union     Spouse name: None    Number of children: None    Years of education: None    Highest education level: None   Occupational History    None   Social Needs    Financial resource strain: None    Food insecurity:     Worry: None     Inability: None    Transportation needs:     Medical: None     Non-medical: None   Tobacco Use    Smoking status: Former Smoker     Last attempt to quit: 6/15/2010     Years since quitting: 10 0    Smokeless tobacco: Never Used   Substance and Sexual Activity    Alcohol use: Never     Frequency: Never    Drug use: Never    Sexual activity: None   Lifestyle    Physical activity:     Days per week: None     Minutes per session: None    Stress: None   Relationships    Social connections:     Talks on phone: None     Gets together: None     Attends Confucianist service: None     Active member of club or organization: None     Attends meetings of clubs or organizations: None     Relationship status: None    Intimate partner violence:     Fear of current or ex partner: None     Emotionally abused: None     Physically abused: None     Forced sexual activity: None   Other Topics Concern    None   Social History Narrative    None       Subjective         Objective    Physical Exam:   Assessment Type: Assess only  General Appearance: Alert, Awake  Respiratory Pattern: Normal  Chest Assessment: Chest expansion symmetrical  Bilateral Breath Sounds: Clear, Diminished    Vitals:  Blood pressure (!) 176/92, pulse 92, temperature 98 8 °F (37 1 °C), temperature source Oral, resp  rate 18, height 5' 10" (1 778 m), weight 105 kg (231 lb), SpO2 90 %  Imaging and other studies: I have personally reviewed pertinent reports  Plan    Respiratory Plan: No distress/Pulmonary history     Patient admitted for CHF  Has no respiratory history  Is a current dialysis patient  Breath sounds are clear, decreased  Patient has a strong, non-productive cough and can clear and protect his airway  No respiratory modalities indicated at this time

## 2020-07-03 NOTE — H&P
H&P Exam - Kaylyn Ewing 39 y o  male MRN: [id number]    Unit/Bed#: DARIN Encounter: [id number]    Assessment:  1  Acute hypoxic respiratory failure due to pulmonary edema  -requiring 2-3 L of oxygen via nasal cannula    2  Pulmonary edema likely due to underlying ESRD, r/o cardiac etiology  -May require increased volume removal in future HD sessions  3  Normocytic anemia  -likely a combination of underlying kidney disease as well as hemodilution from fluid overload    4  Possible community-acquired pneumonia of left upper lobe  -imaging infiltrates are questionable for underlying infection; does have a dry cough but no significant white count or febrile episode  I suspect cough is mainly from underlying pulmonary edema  Chronic conditions - hypertension, ESRD    Plan:  -admit to med/surg unit; continuous cardiac and pulse oximetry monitoring  Administer O2 via nasal cannula and maintain O2 sat above 92%   -consult nephrology; will require HD session today for further fluid removal   Electrolytes within normal range   -ordered transthoracic echocardiogram in the underlying murmurs and for ruling out cardiac etiology of pulmonary edema   -follow blood cultures obtained in ED; order procalcitonin levels, continue ceftriaxone + azithromycin for now  Order urine strep and Legionella antigens  Maintain in droplet precautions until viral panel negative   -continue home blood pressure medications including carvedilol, hydralazine and nifedipine  History of Present Illness   Hx obtained from patient  Patient is a 54-year-old male with a past medical history of end-stage renal disease on home hemodialysis 4 times a week (M/T/Th/F), history of type 2 diabetes (states he is off medications as the A1c is actually below goal) presenting to the ED complaining of shortness of breath on minimal exertion along with orthopnea for the past 4 days  Associated with nonproductive cough    Last hemodialysis session was yesterday  States he is completely anuric  Denies fever, chills, sick contacts, palpitations or chest pain, PND, lower extremity swelling, history of heart failure  On ED presentation, he was initially hypoxic dropping to 88% requiring 3 L of oxygen via nasal cannula  Lab significant for a creatinine of 12 6 with a BUN of 45, hemoglobin of 7 6 (lower than baseline), COVID negative  Imaging reveals diffuse pulmonary infiltrates likely indicating underlying edema along with a possible left upper lobe consolidation  Received 1 time dose of Azithromycin  Review of Systems   ROS reviewed and negative except as mentioned above  Lab Results:  See HPI  Imaging:  See HPI  EKG, Pathology, and Other Studies:  Sinus rhythm, possible right and left atrial abnormalities        Historical Information   Past Medical History:   Diagnosis Date    Chronic kidney disease     Hypertension     Renal disorder     dialysis      Past Surgical History:   Procedure Laterality Date    IR FISTULA/GRAFTOGRAM  1/18/2019     Social History   Social History     Substance and Sexual Activity   Alcohol Use Never    Frequency: Never     Social History     Substance and Sexual Activity   Drug Use Never     Social History     Tobacco Use   Smoking Status Former Smoker    Last attempt to quit: 6/15/2010    Years since quitting: 10 0   Smokeless Tobacco Never Used     E-Cigarette Use: Never User     E-Cigarette/Vaping Substances    Nicotine No     THC No     CBD No     Flavoring No     Other No     Unknown No        Family History: non-contributory    Meds/Allergies   all medications and allergies reviewed  No Known Allergies    Objective   First Vitals:   Blood Pressure: 166/79 (07/02/20 2339)  Pulse: 99 (07/02/20 2339)  Temperature: 99 9 °F (37 7 °C) (07/02/20 2344)  Temp Source: Oral (07/03/20 0215)  Respirations: 20 (07/02/20 2339)  Height: 5' 10" (177 8 cm) (07/03/20 0400)  Weight - Scale: 105 kg (231 lb) (07/03/20 0400)  SpO2: 93 % (07/02/20 2339)    Current Vitals:   Blood Pressure: 159/86 (07/03/20 1200)  Pulse: 88 (07/03/20 1200)  Temperature: 98 6 °F (37 °C) (07/03/20 1140)  Temp Source: Oral (07/03/20 1140)  Respirations: 18 (07/03/20 1200)  Height: 5' 10" (177 8 cm) (07/03/20 0400)  Weight - Scale: 105 kg (231 lb) (07/03/20 0400)  SpO2: 95 % (07/03/20 0900)      Intake/Output Summary (Last 24 hours) at 7/3/2020 1220  Last data filed at 7/3/2020 1140  Gross per 24 hour   Intake 650 ml   Output    Net 650 ml       Invasive Devices     Peripheral Intravenous Line            Peripheral IV 07/03/20 Left Antecubital less than 1 day                Physical Exam  Gen:  Middle-aged [facility] American male in bed; room air  HEENT: NC/AT, PERRLA, no conjunctival pallor, no obvious JVD  RS: symmetric, speaking in full sentences, bibasilar crackles extending up to lower scapular border, no wheezing or rhonchi  No accessory muscle use  CVS:  RRR, W5-H0 heard; systolic murmur best heard in 4th left intercostal space, no peripheral edema, radial pulses 2+  Abdomen:  Soft, NT/ND, bowel sounds normoactive  MSK:  Warm  Moves all 4 extremities    Patent AV fistula bruit and right wrist   :  No Prasad or adult briefs  Neuro:  AAO x4, no gross focal neural deficits  Psych:  Cooperative      Code Status: Level 1 - Full Code   VTE PPx - heparin

## 2020-07-03 NOTE — ED PROVIDER NOTES
History  Chief Complaint   Patient presents with    Medical Problem     pt states "I have fluid in my chest" pt received dialysis this morning and states he feels no different; pt c/o cough and sob; pt wakled in and sitting without distress at this time       70-year-old male presents with worsening shortness of breath  Patient does home peritoneal dialysis  He states he actually to cough more than he is supposed to in attempt to help a shortness of breath however continues to worsen  He feels as though peritoneal dialysis recently has not been sufficient to aid his fluid overload  In addition he has been having some cough, dyspnea on exertion,and fevers as high as 102 according to his wife  Patient was able to ambulate into the emergency department and walked to his room without acute distress  Prior to Admission Medications   Prescriptions Last Dose Informant Patient Reported? Taking? NIFEdipine (PROCARDIA XL) 90 mg 24 hr tablet  Self Yes Yes   Sig: Take 90 mg by mouth daily   NIFEdipine ER (ADALAT CC) 60 MG 24 hr tablet Not Taking at Unknown time Self Yes No   Sig: Take 90 mg by mouth   calcitriol (ROCALTROL) 0 5 MCG capsule  Self Yes Yes   Sig: Take 0 5 mcg by mouth   carvedilol (COREG) 25 mg tablet   Yes Yes   Sig: Take 25 mg by mouth 2 (two) times a day with meals   hydrALAZINE (APRESOLINE) 50 mg tablet Unknown at Unknown time Self Yes No   Sig: Take 50 mg by mouth      Facility-Administered Medications: None       Past Medical History:   Diagnosis Date    Chronic kidney disease     Hypertension     Renal disorder     dialysis        Past Surgical History:   Procedure Laterality Date    IR FISTULA/GRAFTOGRAM  1/18/2019       History reviewed  No pertinent family history  I have reviewed and agree with the history as documented      E-Cigarette/Vaping    E-Cigarette Use Never User      E-Cigarette/Vaping Substances    Nicotine No     THC No     CBD No     Flavoring No     Other No  Unknown No      Social History     Tobacco Use    Smoking status: Former Smoker     Last attempt to quit: 6/15/2010     Years since quitting: 10 0    Smokeless tobacco: Never Used   Substance Use Topics    Alcohol use: Never     Frequency: Never    Drug use: Never       Review of Systems   Constitutional: Positive for chills, fatigue and fever  HENT: Negative for congestion and sore throat  Respiratory: Positive for cough, chest tightness and shortness of breath  Negative for apnea and wheezing  Cardiovascular: Negative for chest pain, palpitations and leg swelling  Gastrointestinal: Negative for abdominal pain, constipation, diarrhea, nausea and vomiting  Genitourinary: Negative for dysuria and flank pain  No change in urination, patient makes minimal of his own urine   Musculoskeletal: Negative for back pain and neck pain  Skin: Negative for color change and rash  Allergic/Immunologic: Negative for immunocompromised state  Neurological: Negative for dizziness, syncope, weakness, light-headedness, numbness and headaches  Physical Exam  Physical Exam   Constitutional: He is oriented to person, place, and time  He appears well-developed and well-nourished  No distress  HENT:   Head: Normocephalic and atraumatic  Eyes: Pupils are equal, round, and reactive to light  Conjunctivae and EOM are normal  Right eye exhibits no discharge  Left eye exhibits no discharge  No scleral icterus  Neck: Normal range of motion  Neck supple  No JVD present  Cardiovascular: Normal rate, regular rhythm, normal heart sounds and intact distal pulses  Exam reveals no gallop and no friction rub  No murmur heard  Pulmonary/Chest: He is in respiratory distress  He has no wheezes  He has rales  He exhibits no tenderness  Rhonchi    Abdominal: Soft  Bowel sounds are normal  He exhibits no distension  There is no tenderness  There is no rebound and no guarding     Musculoskeletal: Normal range of motion  He exhibits no edema, tenderness or deformity  Neurological: He is alert and oriented to person, place, and time  No cranial nerve deficit  Skin: Skin is warm and dry  No rash noted  He is not diaphoretic  No erythema  No pallor  Psychiatric: He has a normal mood and affect  His behavior is normal    Vitals reviewed        Vital Signs  ED Triage Vitals   Temperature Pulse Respirations Blood Pressure SpO2   07/02/20 2344 07/02/20 2339 07/02/20 2339 07/02/20 2339 07/02/20 2339   99 9 °F (37 7 °C) 99 20 166/79 93 %      Temp Source Heart Rate Source Patient Position - Orthostatic VS BP Location FiO2 (%)   07/03/20 0215 07/02/20 2339 07/02/20 2339 07/02/20 2339 --   Oral Monitor Sitting Left arm       Pain Score       07/02/20 2339       No Pain           Vitals:    07/03/20 1500 07/03/20 1503 07/03/20 1700 07/03/20 1731   BP: (!) 174/90 (!) 176/92 (!) 174/102 (!) 174/102   Pulse: 88 92     Patient Position - Orthostatic VS: Lying Lying           Visual Acuity  Visual Acuity      Most Recent Value   L Pupil Size (mm)  3   R Pupil Size (mm)  3   L Pupil Shape  Round   R Pupil Shape  Round          ED Medications  Medications   heparin (porcine) subcutaneous injection 5,000 Units (5,000 Units Subcutaneous Given 7/3/20 1500)   acetaminophen (TYLENOL) tablet 650 mg (has no administration in time range)   carvedilol (COREG) tablet 25 mg (25 mg Oral Given 7/3/20 1700)   hydrALAZINE (APRESOLINE) tablet 50 mg (50 mg Oral Given 7/3/20 1700)   NIFEdipine (PROCARDIA XL) 24 hr tablet 90 mg (90 mg Oral Given 7/3/20 1119)   ceftriaxone (ROCEPHIN) 1 g/50 mL in dextrose IVPB (1,000 mg Intravenous New Bag 7/3/20 1416)   azithromycin (ZITHROMAX) 500 mg in sodium chloride 0 9% 250mL IVPB 500 mg (has no administration in time range)   calcitriol (ROCALTROL) capsule 0 5 mcg (0 5 mcg Oral Given 7/3/20 1119)   acetaminophen (TYLENOL) tablet 650 mg (650 mg Oral Given 7/3/20 0100)   azithromycin (ZITHROMAX) 500 mg in sodium chloride 0 9% 250mL IVPB 500 mg (0 mg Intravenous Stopped 7/3/20 4281)       Diagnostic Studies  Results Reviewed     Procedure Component Value Units Date/Time    Procalcitonin [[id number]]  (Abnormal) Collected:  07/03/20 0955    Lab Status:  Final result Specimen:  Blood from Arm, Left Updated:  07/03/20 1433     Procalcitonin 3 44 ng/ml     Procalcitonin Reflex [[id number]]     Lab Status:  No result Specimen:  Blood     Blood culture [[id number]] Collected:  07/03/20 0211    Lab Status:  Preliminary result Specimen:  Blood from Hand, Left Updated:  07/03/20 1301     Blood Culture Received in Microbiology Lab  Culture in Progress  Blood culture [[id number]] Collected:  07/03/20 0206    Lab Status:  Preliminary result Specimen:  Blood from Arm, Right Updated:  07/03/20 1301     Blood Culture Received in Microbiology Lab  Culture in Progress  Influenza A/B and RSV PCR [[id number]]  (Normal) Collected:  07/03/20 0952    Lab Status:  Final result Specimen:  Nasopharyngeal from Nose Updated:  07/03/20 1033     INFLUENZA A PCR None Detected     INFLUENZA B PCR None Detected     RSV PCR None Detected    Strep Pneumoniae, Urine [[id number]]     Lab Status:  No result Specimen:  Urine, Clean Catch     Legionella antigen, urine [[id number]]     Lab Status:  No result Specimen:  Urine, Clean Catch     Novel Coronavirus [facility] [[id number]]  (Normal) Collected:  07/03/20 0310    Lab Status:  Final result Specimen:  Nares from Nose Updated:  07/03/20 0413     SARS-CoV-2 Negative    Narrative: The specimen collection materials, transport medium, and/or testing methodology utilized in the production of these test results have been proven to be reliable in a limited validation with an abbreviated program under the Emergency Utilization Authorization provided by the FDA  Testing reported as "Presumptive positive" will be confirmed with secondary testing with a reference laboratory to ensure result accuracy  Clinical caution and judgement should be used with the interpretation of these results with consideration of the clinical impression and other laboratory testing  Testing reported as "Positive" or "Negative" has been proven to be accurate according to standard laboratory validation requirements  All testing is performed with control materials showing appropriate reactivity at standard intervals  Lactic acid, plasma [[id number]]  (Normal) Collected:  07/03/20 0200    Lab Status:  Final result Specimen:  Blood from Arm, Right Updated:  07/03/20 0242     LACTIC ACID 1 0 mmol/L     Narrative:       Result may be elevated if tourniquet was used during collection      NT-BNP PRO [[id number]]  (Abnormal) Collected:  07/03/20 0022    Lab Status:  Final result Specimen:  Blood from Arm, Left Updated:  07/03/20 0204     NT-proBNP 7,868 pg/mL     Troponin I [[id number]]  (Normal) Collected:  07/03/20 0023    Lab Status:  Final result Specimen:  Blood from Arm, Left Updated:  07/03/20 0053     Troponin I <0 02 ng/mL     Comprehensive metabolic panel [[id number]]  (Abnormal) Collected:  07/03/20 0022    Lab Status:  Final result Specimen:  Blood from Arm, Left Updated:  07/03/20 0051     Sodium 138 mmol/L      Potassium 3 5 mmol/L      Chloride 97 mmol/L      CO2 28 mmol/L      ANION GAP 13 mmol/L      BUN 45 mg/dL      Creatinine 12 65 mg/dL      Glucose 120 mg/dL      Calcium 9 7 mg/dL      AST 9 U/L      ALT 10 U/L      Alkaline Phosphatase 433 U/L      Total Protein 8 6 g/dL      Albumin 3 6 g/dL      Total Bilirubin 0 60 mg/dL      eGFR 5 ml/min/1 73sq m     Narrative:       [place] guidelines for Chronic Kidney Disease (CKD):     Stage 1 with normal or high GFR (GFR > 90 mL/min/1 73 square meters)    Stage 2 Mild CKD (GFR = 60-89 mL/min/1 73 square meters)    Stage 3A Moderate CKD (GFR = 45-59 mL/min/1 73 square meters)    Stage 3B Moderate CKD (GFR = 30-44 mL/min/1 73 square meters)   Stage 4 Severe CKD (GFR = 15-29 mL/min/1 73 square meters)    Stage 5 End Stage CKD (GFR <15 mL/min/1 73 square meters)  Note: GFR calculation is accurate only with a steady state creatinine    CBC and differential [[id number]]  (Abnormal) Collected:  07/03/20 0022    Lab Status:  Final result Specimen:  Blood from Arm, Left Updated:  07/03/20 0029     WBC 7 24 Thousand/uL      RBC 2 92 Million/uL      Hemoglobin 7 6 g/dL      Hematocrit 24 3 %      MCV 83 fL      MCH 26 0 pg      MCHC 31 3 g/dL      RDW 16 5 %      MPV 9 8 fL      Platelets 310 Thousands/uL      nRBC 0 /100 WBCs      Neutrophils Relative 67 %      Immat GRANS % 0 %      Lymphocytes Relative 22 %      Monocytes Relative 7 %      Eosinophils Relative 3 %      Basophils Relative 1 %      Neutrophils Absolute 4 83 Thousands/µL      Immature Grans Absolute 0 03 Thousand/uL      Lymphocytes Absolute 1 59 Thousands/µL      Monocytes Absolute 0 52 Thousand/µL      Eosinophils Absolute 0 23 Thousand/µL      Basophils Absolute 0 04 Thousands/µL                  CT chest without contrast   ED Interpretation by Anand Franks DO (07/03 0206)       Diffuse airspace opacities throughout the lungs which may represent infection versus pulmonary edema  Recommend short-term follow-up CT scan of the chest to evaluate for resolution in 3 months  Final Result by Gabriela Griffith DO (07/03 0203)      Diffuse airspace opacities throughout the lungs which may represent infection versus pulmonary edema  Recommend short-term follow-up CT scan of the chest to evaluate for resolution in 3 months  The study was marked in EPIC for significant notification  Workstation performed: [id number]         XR chest 1 view portable   Final Result by José Miguel Blackman MD (07/03 1339)      Left upper lobe consolidation, most likely related to pneumonia  Please correlate with the current CT scan findings  Mild cardiomegaly              Workstation performed: [id number]         XR chest pa & lateral    (Results Pending)              Procedures  Procedures   ECG reviewed in realtime, no STEMI  ED Course  ED Course as of Jul 03 1741   Fri Jul 03, 2020   0153 CT chest appears consistent w pneumonia  Will get blood culture, lactate, give IV ABX and ultimately admit      0211 NT-proBNP(!): 7,868   0239 Tiger text sent to slim for admission       0249 LACTIC ACID: 1 0       US AUDIT      Most Recent Value   Initial Alcohol Screen: US AUDIT-C    1  How often do you have a drink containing alcohol?  0 Filed at: 07/03/2020 0030   2  How many drinks containing alcohol do you have on a typical day you are drinking? 0 Filed at: 07/03/2020 0030   3a  Male UNDER 65: How often do you have five or more drinks on one occasion? 0 Filed at: 07/03/2020 0030   3b  FEMALE Any Age, or MALE 65+: How often do you have 4 or more drinks on one occassion? 0 Filed at: 07/03/2020 0030   Audit-C Score  0 Filed at: 07/03/2020 0030                  GABY/DAST-10      Most Recent Value   How many times in the past year have you    Used an illegal drug or used a prescription medication for non-medical reasons? Never Filed at: 07/03/2020 0030                                MDM  Number of Diagnoses or Management Options  CHF (congestive heart failure) [facility]):   Pneumonia:   Diagnosis management comments: Peritoneal dialysis patient - clinically appears to have CHF or pneumonia  Patient has cardiac work up which indicates the same    Will be admitted for dialysis if needed, ABX, and further cardiac obs        Amount and/or Complexity of Data Reviewed  Clinical lab tests: ordered and reviewed  Tests in the radiology section of CPT®: ordered and reviewed          Disposition  Final diagnoses:   CHF (congestive heart failure) ([facility] [address] )   Pneumonia     Time reflects when diagnosis was documented in both MDM as applicable and the Disposition within this note     Time User Action Codes Description Comment 7/3/2020  3:22 AM Brigitte Colvin Add [I50 9] CHF (congestive heart failure) ([facility] [address] )     7/3/2020  3:22 AM Michelle Colvin Add [J18 9] Pneumonia       ED Disposition     ED Disposition Condition Date/Time Comment    Admit Stable Fri Jul 3, 2020  3:34 AM Case was discussed with Heidy MORA and the patient's admission status was agreed to be Admission Status: obs status to the service of Dr Gracy Singh   Follow-up Information    None         Current Discharge Medication List      CONTINUE these medications which have NOT CHANGED    Details   calcitriol (ROCALTROL) 0 5 MCG capsule Take 0 5 mcg by mouth      carvedilol (COREG) 25 mg tablet Take 25 mg by mouth 2 (two) times a day with meals      !! NIFEdipine (PROCARDIA XL) 90 mg 24 hr tablet Take 90 mg by mouth daily      hydrALAZINE (APRESOLINE) 50 mg tablet Take 50 mg by mouth      !! NIFEdipine ER (ADALAT CC) 60 MG 24 hr tablet Take 90 mg by mouth       !! - Potential duplicate medications found  Please discuss with provider  No discharge procedures on file      PDMP Review     None          ED Provider  Electronically Signed by           Farida White DO  07/03/20 4288

## 2020-07-03 NOTE — PLAN OF CARE
Problem: Potential for Falls  Goal: Patient will remain free of falls  Description  INTERVENTIONS:  - Assess patient frequently for physical needs  -  Identify cognitive and physical deficits and behaviors that affect risk of falls    -  [place] fall precautions as indicated by assessment   - Educate patient/family on patient safety including physical limitations  - Instruct patient to call for assistance with activity based on assessment  - Modify environment to reduce risk of injury  - Consider OT/PT consult to assist with strengthening/mobility  Outcome: Progressing     Problem: PAIN - ADULT  Goal: Verbalizes/displays adequate comfort level or baseline comfort level  Description  Interventions:  - Encourage patient to monitor pain and request assistance  - Assess pain using appropriate pain scale  - Administer analgesics based on type and severity of pain and evaluate response  - Implement non-pharmacological measures as appropriate and evaluate response  - Consider cultural and social influences on pain and pain management  - Notify physician/advanced practitioner if interventions unsuccessful or patient reports new pain  Outcome: Progressing     Problem: INFECTION - ADULT  Goal: Absence or prevention of progression during hospitalization  Description  INTERVENTIONS:  - Assess and monitor for signs and symptoms of infection  - Monitor lab/diagnostic results  - Monitor all insertion sites, i e  indwelling lines, tubes, and drains  - Monitor endotracheal if appropriate and nasal secretions for changes in amount and color  - [place] appropriate cooling/warming therapies per order  - Administer medications as ordered  - Instruct and encourage patient and family to use good hand hygiene technique  - Identify and instruct in appropriate isolation precautions for identified infection/condition  Outcome: Progressing  Goal: Absence of fever/infection during neutropenic period  Description  INTERVENTIONS:  - Monitor WBC    Outcome: Progressing     Problem: SAFETY ADULT  Goal: Maintain or return to baseline ADL function  Description  INTERVENTIONS:  -  Assess patient's ability to carry out ADLs; assess patient's baseline for ADL function and identify physical deficits which impact ability to perform ADLs (bathing, care of mouth/teeth, toileting, grooming, dressing, etc )  - Assess/evaluate cause of self-care deficits   - Assess range of motion  - Assess patient's mobility; develop plan if impaired  - Assess patient's need for assistive devices and provide as appropriate  - Encourage maximum independence but intervene and supervise when necessary  - Involve family in performance of ADLs  - Assess for home care needs following discharge   - Consider OT consult to assist with ADL evaluation and planning for discharge  - Provide patient education as appropriate  Outcome: Progressing  Goal: Maintain or return mobility status to optimal level  Description  INTERVENTIONS:  - Assess patient's baseline mobility status (ambulation, transfers, stairs, etc )    - Identify cognitive and physical deficits and behaviors that affect mobility  - Identify mobility aids required to assist with transfers and/or ambulation (gait belt, sit-to-stand, lift, walker, cane, etc )  - [place] fall precautions as indicated by assessment  - Record patient progress and toleration of activity level on Mobility SBAR; progress patient to next Phase/Stage  - Instruct patient to call for assistance with activity based on assessment  - Consider rehabilitation consult to assist with strengthening/weightbearing, etc   Outcome: Progressing     Problem: DISCHARGE PLANNING  Goal: Discharge to home or other facility with appropriate resources  Description  INTERVENTIONS:  - Identify barriers to discharge w/patient and caregiver  - Arrange for needed discharge resources and transportation as appropriate  - Identify discharge learning needs (meds, wound care, etc )  - Arrange for interpretive services to assist at discharge as needed  - Refer to Case Management Department for coordinating discharge planning if the patient needs post-hospital services based on physician/advanced practitioner order or complex needs related to functional status, cognitive ability, or social support system  Outcome: Progressing     Problem: Knowledge Deficit  Goal: Patient/family/caregiver demonstrates understanding of disease process, treatment plan, medications, and discharge instructions  Description  Complete learning assessment and assess knowledge base    Interventions:  - Provide teaching at level of understanding  - Provide teaching via preferred learning methods  Outcome: Progressing

## 2020-07-03 NOTE — PLAN OF CARE
3 hour treatment completed utilizing a right forearm AV fistula, patient cannulated X2 with 15 gauge needles with no complications, ordered BFR maintained throughout treatment, antibiotics delivered within last 1/2 hour of treatment, treatment stable, patient denies any new issues or complaints, patient transported to his assigned room post treatment  Report given to primary RN      Pre weight: 105 0kg  Post weight: 100 5kg (bed weight)  UF: 3500          Problem: METABOLIC, FLUID AND ELECTROLYTES - ADULT  Goal: Electrolytes maintained within normal limits  Description  INTERVENTIONS:  - Monitor labs and assess patient for signs and symptoms of electrolyte imbalances  - Administer electrolyte replacement as ordered  - Monitor response to electrolyte replacements, including repeat lab results as appropriate  - Instruct patient on fluid and nutrition as appropriate  Outcome: Progressing  Goal: Fluid balance maintained  Description  INTERVENTIONS:  - Monitor labs   - Monitor I/O and WT  - Instruct patient on fluid and nutrition as appropriate  - Assess for signs & symptoms of volume excess or deficit  Outcome: Progressing     Problem: HEMATOLOGIC - ADULT  Goal: Maintains hematologic stability  Description  INTERVENTIONS  - Assess for signs and symptoms of bleeding or hemorrhage  - Monitor labs  - Administer supportive blood products/factors as ordered and appropriate  Outcome: Progressing

## 2020-07-03 NOTE — ED NOTES
CC- Pneumonia, CHF exacerbation    Admission related to injury? - N/A  Orientation status- AOx4    Abnormal labs/abnormal focused assessment/vitals- bnp, BUN, creat, procal all elevated  Had extra day of dialysis today     Medication/drips- N/A    Last time narcotics given- N/A     IV lines/drains/etc- 20G LAC    Isolation status- N/A    Skin- WDL    BMAT screening tool-? Level 4 mobility     ED nurse's name and phone number- [phone] Fabiana Hadley, RN  07/03/20 6010

## 2020-07-03 NOTE — UTILIZATION REVIEW
Initial Clinical Review    Observation  7/3  @  0334 changed to Ip admission  7/3  @   1642  Needs continued  IP treatment    Admission: Date/Time/Statement: Admission Orders (From admission, onward)     Ordered        07/03/20 0334  Place in Observation  Once                   07/03/20 1642  Inpatient Admission Once      07/03/20 1642       ED Arrival Information     Expected Arrival Acuity Means of Arrival Escorted By Service Admission Type    - 7/2/2020 23:18 Emergent Walk-In Friend  (girlfriend) General Medicine Emergency    Arrival Complaint    cough        Chief Complaint   Patient presents with    Medical Problem     pt states "I have fluid in my chest" pt received dialysis this morning and states he feels no different; pt c/o cough and sob; pt wakled in and sitting without distress at this time     Assessment/Plan:     39  Y O male  Presents to ED from home with increasing shortness of  Breath and  Fevers, as high as  102    Does  Receive  Peritoneal dialysis at home, feels  It has not  Been sufficient  Lately to aid his  Fluid overload  CXR  Shows  Poss  PNA   FERNANDO  Initially  Hypoxic  Requiring  O2  3L for sats  88 %  Labs show  Creat  12 6, BUN 45 and  hmgb    7 6  Admit  Observation  With   Acute hypoxic respiratory failure due to pulmonary edema, pulmonary edema  Likely due to underlying  ESRD and plan is   O2 2-3  L NC, may require  HD day of admission, monitor labs, blood cultures, droplet precautions and  NEPHROLOGY CONSULT:    Nephrology   Consult  (  7/3)    PMH   ESRD on home  HD 4 days/week  For  2 1/2 hrs, last  HD  7/1  Will  Need  HD  Day of admission given volume overload  And  Low  O2 sats          ED Triage Vitals   Temperature Pulse Respirations Blood Pressure SpO2   07/02/20 2344 07/02/20 2339 07/02/20 2339 07/02/20 2339 07/02/20 2339   99 9 °F (37 7 °C) 99 20 166/79 93 %      Temp Source Heart Rate Source Patient Position - Orthostatic VS BP Location FiO2 (%)   07/03/20 4539 07/02/20 2339 07/02/20 2339 07/02/20 2339 --   Oral Monitor Sitting Left arm       Pain Score       07/02/20 2339       No Pain        Wt Readings from Last 1 Encounters:   07/03/20 105 kg (231 lb)     Additional Vital Signs:   07/03/20 0700    87  18  148/73  103  88 %Abnormal       None (Room air)  Lying   07/03/20 0600    85  18  142/69    93 %  32  3 L/min  Nasal cannula  Lying   07/03/20 0430    86  18  135/63  90  92 %  32  3 L/min  Nasal cannula  Lying   07/03/20 0400    85  18  131/62  89  95 %  32  3 L/min  Nasal cannula  Lying   07/03/20 0330    88  18  143/79  105  94 %  32  3 L/min  Nasal cannula  Lying   07/03/20 0305    91  18  159/72    96 %  28  2 L/min  Nasal cannula  Lying   07/03/20 0215  99 °F (37 2 °C)                     07/03/20 0201    95  18  164/84    92 %      None (Room air)  Lying   07/03/20 0100    97  17  168/83  118  91 %      None (Room air)  Lying   07/03/20 0059    97  20  168/83    93 %      None (Room air)  Lying   07/02/20 2344  99 9 °F (37 7 °C)                     07/02/20 2339    99  20  166/79    93 %      None (Room air)  Sitting         Pertinent Labs/Diagnostic Test Results:   Ct  Chest  ( 7/3)     Diffuse airspace opacities throughout the lungs which may represent infection versus pulmonary edema   Recommend short-term follow-up CT scan of the chest to evaluate for resolution in 3 months    Results from last 7 days   Lab Units 07/03/20  0310   SARS-COV-2  Negative     Results from last 7 days   Lab Units 07/03/20  0022   WBC Thousand/uL 7 24   HEMOGLOBIN g/dL 7 6*   HEMATOCRIT % 24 3*   PLATELETS Thousands/uL 207   NEUTROS ABS Thousands/µL 4 83         Results from last 7 days   Lab Units 07/03/20  0022   SODIUM mmol/L 138   POTASSIUM mmol/L 3 5   CHLORIDE mmol/L 97*   CO2 mmol/L 28   ANION GAP mmol/L 13   BUN mg/dL 45*   CREATININE mg/dL 12 65*   EGFR ml/min/1 73sq m 5   CALCIUM mg/dL 9 7     Results from last 7 days   Lab Units 07/03/20  0022   AST U/L 9   ALT U/L 10*   ALK PHOS U/L 433*   TOTAL PROTEIN g/dL 8 6*   ALBUMIN g/dL 3 6   TOTAL BILIRUBIN mg/dL 0 60         Results from last 7 days   Lab Units 07/03/20  0022   GLUCOSE RANDOM mg/dL 120           Results from last 7 days   Lab Units 07/03/20  0023   TROPONIN I ng/mL <0 02           Results from last 7 days   Lab Units 07/03/20  0200   LACTIC ACID mmol/L 1 0             Results from last 7 days   Lab Units 07/03/20  0022   NT-PRO BNP pg/mL 7,868*         ED Treatment:   Medication Administration from 07/02/2020 2317 to 07/03/2020 0941       Date/Time Order Dose Route Action Comments     07/03/2020 0100 acetaminophen (TYLENOL) tablet 650 mg 650 mg Oral Given      07/03/2020 0545 azithromycin (ZITHROMAX) 500 mg in sodium chloride 0 9% 250mL IVPB 500 mg 0 mg Intravenous Stopped      07/03/2020 0216 azithromycin (ZITHROMAX) 500 mg in sodium chloride 0 9% 250mL IVPB 500 mg 500 mg Intravenous New Bag         Admitting Diagnosis: No admission diagnoses are documented for this encounter  Age/Sex: 39 y o  male  Admission Orders:  Scheduled Medications:    Medications:  heparin (porcine) 5,000 Units Subcutaneous Q8H [address]     Continuous IV Infusions:     PRN Meds:    acetaminophen 650 mg Oral Q6H PRN       IP CONSULT TO NEPHROLOGY    Network Utilization Review Department  [email or web address]  ATTENTION: Please call with any questions or concerns to [phone] and carefully listen to the prompts so that you are directed to the right person  All voicemails are confidential   Linette Snyder all requests for admission clinical reviews, approved or denied determinations and any other requests to dedicated fax number below belonging to the campus where the patient is receiving treatment   List of dedicated fax numbers for the Facilities:  FACILITY NAME UR FAX NUMBER   ADMISSION DENIALS (Administrative/Medical Necessity) [phone]   [address] (Maternity/NICU/Pediatrics) [place] [phone]   Amelia Abdalla [phone]   Delorise Quinton [phone]   [address] [phone]   [facility]  [phone]   [address] [place] [address], [address]  [address] [facility] [address] [facility] [phone]

## 2020-07-03 NOTE — ED NOTES
Pt sleeping in bed, O2 drops to 88% when sleeping  Placed on 2L NC  Sats now 92-94%       Theresa Trejo RN  07/03/20 2338

## 2020-07-04 ENCOUNTER — APPOINTMENT (INPATIENT)
Dept: ULTRASOUND IMAGING | Facility: HOSPITAL | Age: 45
DRG: 194 | End: 2020-07-04
Payer: COMMERCIAL

## 2020-07-04 ENCOUNTER — APPOINTMENT (INPATIENT)
Dept: RADIOLOGY | Facility: HOSPITAL | Age: 45
DRG: 194 | End: 2020-07-04
Payer: COMMERCIAL

## 2020-07-04 PROBLEM — J18.9 COMMUNITY ACQUIRED PNEUMONIA: Status: ACTIVE | Noted: 2020-07-04

## 2020-07-04 PROBLEM — R09.02 HYPOXIA: Status: ACTIVE | Noted: 2020-07-04

## 2020-07-04 PROBLEM — J96.01 ACUTE HYPOXEMIC RESPIRATORY FAILURE (HCC): Status: ACTIVE | Noted: 2020-07-04

## 2020-07-04 LAB
ANION GAP SERPL CALCULATED.3IONS-SCNC: 13 MMOL/L (ref 4–13)
BASOPHILS # BLD AUTO: 0.03 THOUSANDS/ΜL (ref 0–0.1)
BASOPHILS NFR BLD AUTO: 0 % (ref 0–1)
BUN SERPL-MCNC: 34 MG/DL (ref 5–25)
CALCIUM SERPL-MCNC: 9.7 MG/DL (ref 8.3–10.1)
CHLORIDE SERPL-SCNC: 101 MMOL/L (ref 100–108)
CO2 SERPL-SCNC: 27 MMOL/L (ref 21–32)
CREAT SERPL-MCNC: 10.71 MG/DL (ref 0.6–1.3)
EOSINOPHIL # BLD AUTO: 0.26 THOUSAND/ΜL (ref 0–0.61)
EOSINOPHIL NFR BLD AUTO: 4 % (ref 0–6)
ERYTHROCYTE [DISTWIDTH] IN BLOOD BY AUTOMATED COUNT: 16.2 % (ref 11.6–15.1)
GFR SERPL CREATININE-BSD FRML MDRD: 6 ML/MIN/1.73SQ M
GLUCOSE SERPL-MCNC: 101 MG/DL (ref 65–140)
HCT VFR BLD AUTO: 25.6 % (ref 36.5–49.3)
HGB BLD-MCNC: 7.8 G/DL (ref 12–17)
IMM GRANULOCYTES # BLD AUTO: 0.03 THOUSAND/UL (ref 0–0.2)
IMM GRANULOCYTES NFR BLD AUTO: 0 % (ref 0–2)
LYMPHOCYTES # BLD AUTO: 1.79 THOUSANDS/ΜL (ref 0.6–4.47)
LYMPHOCYTES NFR BLD AUTO: 25 % (ref 14–44)
MCH RBC QN AUTO: 25.6 PG (ref 26.8–34.3)
MCHC RBC AUTO-ENTMCNC: 30.5 G/DL (ref 31.4–37.4)
MCV RBC AUTO: 84 FL (ref 82–98)
MONOCYTES # BLD AUTO: 0.46 THOUSAND/ΜL (ref 0.17–1.22)
MONOCYTES NFR BLD AUTO: 6 % (ref 4–12)
NEUTROPHILS # BLD AUTO: 4.71 THOUSANDS/ΜL (ref 1.85–7.62)
NEUTS SEG NFR BLD AUTO: 65 % (ref 43–75)
NRBC BLD AUTO-RTO: 0 /100 WBCS
PLATELET # BLD AUTO: 216 THOUSANDS/UL (ref 149–390)
PMV BLD AUTO: 9.6 FL (ref 8.9–12.7)
POTASSIUM SERPL-SCNC: 3.7 MMOL/L (ref 3.5–5.3)
RBC # BLD AUTO: 3.05 MILLION/UL (ref 3.88–5.62)
SODIUM SERPL-SCNC: 141 MMOL/L (ref 136–145)
WBC # BLD AUTO: 7.28 THOUSAND/UL (ref 4.31–10.16)

## 2020-07-04 PROCEDURE — 80048 BASIC METABOLIC PNL TOTAL CA: CPT | Performed by: INTERNAL MEDICINE

## 2020-07-04 PROCEDURE — 85025 COMPLETE CBC W/AUTO DIFF WBC: CPT | Performed by: INTERNAL MEDICINE

## 2020-07-04 PROCEDURE — 71046 X-RAY EXAM CHEST 2 VIEWS: CPT

## 2020-07-04 PROCEDURE — 99233 SBSQ HOSP IP/OBS HIGH 50: CPT | Performed by: INTERNAL MEDICINE

## 2020-07-04 PROCEDURE — 84145 PROCALCITONIN (PCT): CPT | Performed by: STUDENT IN AN ORGANIZED HEALTH CARE EDUCATION/TRAINING PROGRAM

## 2020-07-04 PROCEDURE — 99232 SBSQ HOSP IP/OBS MODERATE 35: CPT | Performed by: STUDENT IN AN ORGANIZED HEALTH CARE EDUCATION/TRAINING PROGRAM

## 2020-07-04 RX ADMIN — HEPARIN SODIUM 5000 UNITS: 5000 INJECTION INTRAVENOUS; SUBCUTANEOUS at 06:08

## 2020-07-04 RX ADMIN — HYDRALAZINE HYDROCHLORIDE 50 MG: 25 TABLET ORAL at 06:03

## 2020-07-04 RX ADMIN — Medication 1000 MG: at 09:30

## 2020-07-04 RX ADMIN — HYDRALAZINE HYDROCHLORIDE 50 MG: 25 TABLET ORAL at 13:02

## 2020-07-04 RX ADMIN — HEPARIN SODIUM 5000 UNITS: 5000 INJECTION INTRAVENOUS; SUBCUTANEOUS at 13:03

## 2020-07-04 RX ADMIN — NIFEDIPINE 90 MG: 60 TABLET, FILM COATED, EXTENDED RELEASE ORAL at 09:15

## 2020-07-04 RX ADMIN — CALCITRIOL CAPSULES 0.25 MCG 0.5 MCG: 0.25 CAPSULE ORAL at 09:15

## 2020-07-04 RX ADMIN — AZITHROMYCIN 500 MG: 500 INJECTION, POWDER, LYOPHILIZED, FOR SOLUTION INTRAVENOUS at 09:30

## 2020-07-04 RX ADMIN — HEPARIN SODIUM 5000 UNITS: 5000 INJECTION INTRAVENOUS; SUBCUTANEOUS at 22:30

## 2020-07-04 RX ADMIN — HYDRALAZINE HYDROCHLORIDE 50 MG: 25 TABLET ORAL at 22:30

## 2020-07-04 RX ADMIN — CARVEDILOL 25 MG: 12.5 TABLET, FILM COATED ORAL at 16:30

## 2020-07-04 RX ADMIN — CARVEDILOL 25 MG: 12.5 TABLET, FILM COATED ORAL at 09:00

## 2020-07-04 NOTE — ASSESSMENT & PLAN NOTE
Wt Readings from Last 3 Encounters:   07/04/20 99 5 kg (219 lb 5 7 oz)   06/24/20 105 kg (231 lb 14 8 oz)   06/15/20 108 kg (239 lb)     · Elevated BNP on arrival   · CXR showing vascular congestion  · Echocardiogram pending completion  · Volume overload in setting of ESRD  · Continue with coreg, monitor intake, daily weights

## 2020-07-04 NOTE — ASSESSMENT & PLAN NOTE
· Left upper lobe consolidation  · Started on IV ceftriaxone  · Elevated procalcitonin on admission, continue to trend

## 2020-07-04 NOTE — ASSESSMENT & PLAN NOTE
· Secondary to fluid overload  · Previously on 2-3 liters nasal canula  · Now resolved with dialysis

## 2020-07-04 NOTE — PROGRESS NOTES
Progress Note - Nathan Sánchez 1975, 39 y o  male MRN: [id number]    Unit/Bed#: -01 Encounter: [id number]    Primary Care Provider: Dominic Anaya DO   Date and time admitted to hospital: 7/3/2020 12:04 AM        Hypoxia  Assessment & Plan  · Secondary to fluid overload  · Previously on 2-3 liters nasal canula  · Now resolved with dialysis    Community acquired pneumonia  Assessment & Plan  · Left upper lobe consolidation  · Started on IV ceftriaxone  · Elevated procalcitonin on admission, continue to trend    End-stage renal disease on hemodialysis [facility])  Assessment & Plan  · Continue with HD   · Nephrology following    * Acute congestive heart failure ([facility] [address] )  Assessment & Plan  Wt Readings from Last 3 Encounters:   20 99 5 kg (219 lb 5 7 oz)   20 105 kg (231 lb 14 8 oz)   06/15/20 108 kg (239 lb)     · Elevated BNP on arrival   · CXR showing vascular congestion  · Echocardiogram pending completion  · Volume overload in setting of ESRD  · Continue with coreg, monitor intake, daily weights          VTE Pharmacologic Prophylaxis:   Pharmacologic: Heparin  Mechanical VTE Prophylaxis in Place: Yes    Patient Centered Rounds: I have performed bedside rounds with nursing staff today  Discussions with Specialists or Other Care Team Provider: Nephrology    Time Spent for Care: 30 minutes  More than 50% of total time spent on counseling and coordination of care as described above  Current Length of Stay: 1 day(s)    Current Patient Status: Inpatient   Certification Statement: The patient will continue to require additional inpatient hospital stay due to fluid overload    Discharge Plan:  To be determined    Code Status: Level 1 - Full Code      Subjective:   Patient feels better today    Objective:     Vitals:   Temp (24hrs), Av 4 °F (37 4 °C), Min:98 6 °F (37 °C), Max:101 5 °F (38 6 °C)    Temp:  [98 6 °F (37 °C)-101 5 °F (38 6 °C)] 98 9 °F (37 2 °C)  HR:  [89-95] 89  Resp:  [18] 18  BP: (140-174)/() 173/89  SpO2:  [92 %-94 %] 94 %  Body mass index is 31 47 kg/m²  Input and Output Summary (last 24 hours): Intake/Output Summary (Last 24 hours) at 7/4/2020 1710  Last data filed at 7/4/2020 0900  Gross per 24 hour   Intake 210 ml   Output    Net 210 ml       Physical Exam:     Physical Exam   Constitutional: He is oriented to person, place, and time  He appears well-developed and well-nourished  HENT:   Head: Normocephalic and atraumatic  Cardiovascular: Normal rate and regular rhythm  Pulmonary/Chest: He has rales  Abdominal: Soft  Bowel sounds are normal    Neurological: He is alert and oriented to person, place, and time  Skin: Skin is warm and dry  Psychiatric: He has a normal mood and affect  His behavior is normal          Additional Data:     Labs:    Results from last 7 days   Lab Units 07/04/20  0644   WBC Thousand/uL 7 28   HEMOGLOBIN g/dL 7 8*   HEMATOCRIT % 25 6*   PLATELETS Thousands/uL 216   NEUTROS PCT % 65   LYMPHS PCT % 25   MONOS PCT % 6   EOS PCT % 4     Results from last 7 days   Lab Units 07/04/20  0644 07/03/20  0022   SODIUM mmol/L 141 138   POTASSIUM mmol/L 3 7 3 5   CHLORIDE mmol/L 101 97*   CO2 mmol/L 27 28   BUN mg/dL 34* 45*   CREATININE mg/dL 10 71* 12 65*   ANION GAP mmol/L 13 13   CALCIUM mg/dL 9 7 9 7   ALBUMIN g/dL  --  3 6   TOTAL BILIRUBIN mg/dL  --  0 60   ALK PHOS U/L  --  433*   ALT U/L  --  10*   AST U/L  --  9   GLUCOSE RANDOM mg/dL 101 120                 Results from last 7 days   Lab Units 07/03/20  0955 07/03/20  0200   LACTIC ACID mmol/L  --  1 0   PROCALCITONIN ng/ml 3 44*  --            * I Have Reviewed All Lab Data Listed Above  * Additional Pertinent Lab Tests Reviewed:  All [place] Admission Reviewed    Imaging:    Imaging Reports Reviewed Today Include: CXR  Imaging Personally Reviewed by Myself Includes:  See above    Recent Cultures (last 7 days):     Results from last 7 days   Lab Units 07/03/20  0211 07/03/20  0206   BLOOD CULTURE  No Growth at 24 hrs  No Growth at 24 hrs  Last 24 Hours Medication List:     Current Facility-Administered Medications:  acetaminophen 650 mg Oral Q6H PRN Rohit Macias MD    azithromycin 500 mg Intravenous Q24H Rohit Macias MD Last Rate: 500 mg (07/04/20 0930)   calcitriol 0 5 mcg Oral Daily Carlos Hernandez MD    carvedilol 25 mg Oral BID With Meals Rohit Macias MD    cefTRIAXone 1,000 mg Intravenous Q24H Rohit Macias MD Last Rate: 1,000 mg (07/04/20 0930)   heparin (porcine) 5,000 Units Subcutaneous Q8H [facility] Rohit Macias MD    hydrALAZINE 50 mg Oral Q8H [facility] Rohit Macias MD    HYDROmorphone 1 mg Intravenous Q4H PRN CLARK Tong    NIFEdipine 90 mg Oral Daily Rohit Macias MD    oxyCODONE 10 mg Oral Q4H PRN CLARK Tong    oxyCODONE 5 mg Oral Q4H PRN CLARK Tong         Today, Patient Was Seen By: FAYE Mclaughlin      ** Please Note: Dictation voice to text software may have been used in the creation of this document   **

## 2020-07-04 NOTE — PROGRESS NOTES
NEPHROLOGY PROGRESS NOTE    Patient: Kerwin Davis               Sex: male          DOA: 7/3/2020 12:04 AM   YOB: 1975        Age:  39 y o         LOS:  LOS: 1 day   7/4/2020    REASON FOR THE CONSULTATION:      ESRD on H D    SUBJECTIVE     Patient feels much better  Breathing and cough have improved  Patient receiving IV antibiotics for presumed community acquired pneumonia  CURRENT MEDICATIONS       Current Facility-Administered Medications:     acetaminophen (TYLENOL) tablet 650 mg, 650 mg, Oral, Q6H PRN, Magui Banerjee MD    azithromycin (ZITHROMAX) 500 mg in sodium chloride 0 9% 250mL IVPB 500 mg, 500 mg, Intravenous, Q24H, Magui Banerjee MD, Last Rate: 250 mL/hr at 07/04/20 0930, 500 mg at 07/04/20 0930    calcitriol (ROCALTROL) capsule 0 5 mcg, 0 5 mcg, Oral, Daily, Randy Barrett MD, 0 5 mcg at 07/04/20 0915    carvedilol (COREG) tablet 25 mg, 25 mg, Oral, BID With Meals, Magui Banerjee MD, 25 mg at 07/04/20 0900    ceftriaxone (ROCEPHIN) 1 g/50 mL in dextrose IVPB, 1,000 mg, Intravenous, Q24H, Magui Banerjee MD, Last Rate: 100 mL/hr at 07/04/20 0930, 1,000 mg at 07/04/20 0930    heparin (porcine) subcutaneous injection 5,000 Units, 5,000 Units, Subcutaneous, Q8H [facility], Magui Banerjee MD, 5,000 Units at 07/04/20 1303    hydrALAZINE (APRESOLINE) tablet 50 mg, 50 mg, Oral, Q8H [facility], Magui Banerjee MD, 50 mg at 07/04/20 1302    HYDROmorphone (DILAUDID) injection 1 mg, 1 mg, Intravenous, Q4H PRN, CLARK Hernandez    NIFEdipine (PROCARDIA XL) 24 hr tablet 90 mg, 90 mg, Oral, Daily, Magui Banerjee MD, 90 mg at 07/04/20 0915    oxyCODONE (ROXICODONE) immediate release tablet 10 mg, 10 mg, Oral, Q4H PRN, CLARK Hernandez    oxyCODONE (ROXICODONE) IR tablet 5 mg, 5 mg, Oral, Q4H PRN, CLARK Hernandez    REVIEW OF SYSTEMS     Review of Systems   Constitutional: Negative  HENT: Negative  Eyes: Negative  Respiratory: Negative  Cardiovascular: Negative  Gastrointestinal: Negative  Endocrine: Negative  Genitourinary: Negative  Musculoskeletal: Negative  Skin: Negative  Allergic/Immunologic: Negative  Neurological: Negative  Hematological: Negative  All other systems reviewed and are negative  OBJECTIVE     Current Weight: Weight - Scale: 99 5 kg (219 lb 5 7 oz)  Vitals:    07/04/20 1305   BP: 164/83   Pulse:    Resp:    Temp:    SpO2:      Body mass index is 31 47 kg/m²  Intake/Output Summary (Last 24 hours) at 7/4/2020 1403  Last data filed at 7/4/2020 0900  Gross per 24 hour   Intake 810 ml   Output 3015 ml   Net -2205 ml       PHYSICAL EXAMINATION     Physical Exam   Constitutional: He is oriented to person, place, and time  HENT:   Head: Normocephalic and atraumatic  Eyes: Pupils are equal, round, and reactive to light  Neck: Neck supple  No JVD present  Cardiovascular: Normal rate, regular rhythm and normal heart sounds  Exam reveals no friction rub  No murmur heard  Pulmonary/Chest: Effort normal  He has rales  Abdominal: Soft  Bowel sounds are normal  He exhibits no distension  There is no tenderness  There is no rebound  Musculoskeletal: He exhibits no edema or tenderness  Neurological: He is alert and oriented to person, place, and time  Skin: Skin is dry  No rash noted  Psychiatric: He has a normal mood and affect           LAB RESULTS     Results from last 7 days   Lab Units 07/04/20  0644 07/03/20  0022   WBC Thousand/uL 7 28 7 24   HEMOGLOBIN g/dL 7 8* 7 6*   HEMATOCRIT % 25 6* 24 3*   PLATELETS Thousands/uL 216 207   POTASSIUM mmol/L 3 7 3 5   CHLORIDE mmol/L 101 97*   CO2 mmol/L 27 28   BUN mg/dL 34* 45*   CREATININE mg/dL 10 71* 12 65*   EGFR ml/min/1 73sq m 6 5   CALCIUM mg/dL 9 7 9 7           RADIOLOGY RESULTS      Results for orders placed during the hospital encounter of 07/03/20   XR chest 1 view portable    Narrative CHEST     INDICATION:   reports increased SOB and fluid retention  COMPARISON:  3/30/2020    EXAM PERFORMED/VIEWS:  XR CHEST PORTABLE  AP semierect       FINDINGS:  Stable single lead pacer/AICD device with generator on the left  Mild cardiomegaly  Mediastinal contour is normal     There is patchy consolidation in the left upper lobe  Lungs elsewhere appear clear although there are apparently more widespread opacities seen on the current CT scan  Osseous structures appear within normal limits for patient age  Impression Left upper lobe consolidation, most likely related to pneumonia  Please correlate with the current CT scan findings  Mild cardiomegaly  Workstation performed: [id number]       Results for orders placed during the hospital encounter of 03/30/20   X-ray chest 2 views    Narrative CHEST     INDICATION:   chest pain    COMPARISON: Chest radiograph from 12/19/2019  EXAM PERFORMED/VIEWS:  XR CHEST PA & LATERAL WITH DUAL ENERGY SUBTRACTION  FINDINGS:    Cardiomediastinal silhouette is normal  Left subclavian ICD lead in right ventricular apex  Lungs are clear  No effusion or pneumothorax  Osseous structures are within normal limits for age  Impression No acute cardiopulmonary disease  Workstation performed: [id number]         ASSESSMENT/PLAN       39 y o  M with PMH of HTN, ESRD on HD, secondary hyperparathyroidism of renal origin who p/w fever, shortness of breath and cough and found to be in volume overload and probable lung infection  1) ESRD on HD: Undergoes home HD for 4 days per week with 2 5 hour per session  Underwent 4 hour session yesterday with 3 L UF achieved  2) Community acquired pneumonia: + procalcitonin noted  On IV antibiotics and symptomatic improvement noted  3) HTN due to ESRD: BP is slightly above target at 592 mm Hg systolic  4) Anemia due to ESRD: Hemoglobin of 7 8 and low  Improved from 7 6 yesterday  Receiving Epogen with HD           Otis Pan MD  Nephrology  7/4/2020

## 2020-07-05 ENCOUNTER — APPOINTMENT (INPATIENT)
Dept: NON INVASIVE DIAGNOSTICS | Facility: HOSPITAL | Age: 45
DRG: 194 | End: 2020-07-05
Payer: COMMERCIAL

## 2020-07-05 PROBLEM — R78.81 POSITIVE BLOOD CULTURE: Status: ACTIVE | Noted: 2020-07-05

## 2020-07-05 LAB
ALBUMIN SERPL BCP-MCNC: 3.1 G/DL (ref 3.5–5)
ALP SERPL-CCNC: 390 U/L (ref 46–116)
ALT SERPL W P-5'-P-CCNC: <6 U/L (ref 12–78)
ANION GAP SERPL CALCULATED.3IONS-SCNC: 15 MMOL/L (ref 4–13)
AST SERPL W P-5'-P-CCNC: 9 U/L (ref 5–45)
BASOPHILS # BLD AUTO: 0.02 THOUSANDS/ΜL (ref 0–0.1)
BASOPHILS NFR BLD AUTO: 0 % (ref 0–1)
BILIRUB SERPL-MCNC: 0.4 MG/DL (ref 0.2–1)
BUN SERPL-MCNC: 45 MG/DL (ref 5–25)
CALCIUM SERPL-MCNC: 9.4 MG/DL (ref 8.3–10.1)
CHLORIDE SERPL-SCNC: 101 MMOL/L (ref 100–108)
CO2 SERPL-SCNC: 26 MMOL/L (ref 21–32)
CREAT SERPL-MCNC: 13.23 MG/DL (ref 0.6–1.3)
EOSINOPHIL # BLD AUTO: 0.28 THOUSAND/ΜL (ref 0–0.61)
EOSINOPHIL NFR BLD AUTO: 5 % (ref 0–6)
ERYTHROCYTE [DISTWIDTH] IN BLOOD BY AUTOMATED COUNT: 16.2 % (ref 11.6–15.1)
GFR SERPL CREATININE-BSD FRML MDRD: 5 ML/MIN/1.73SQ M
GLUCOSE SERPL-MCNC: 109 MG/DL (ref 65–140)
HCT VFR BLD AUTO: 24.9 % (ref 36.5–49.3)
HGB BLD-MCNC: 7.7 G/DL (ref 12–17)
IMM GRANULOCYTES # BLD AUTO: 0.03 THOUSAND/UL (ref 0–0.2)
IMM GRANULOCYTES NFR BLD AUTO: 1 % (ref 0–2)
LYMPHOCYTES # BLD AUTO: 1.5 THOUSANDS/ΜL (ref 0.6–4.47)
LYMPHOCYTES NFR BLD AUTO: 25 % (ref 14–44)
MAGNESIUM SERPL-MCNC: 1.9 MG/DL (ref 1.6–2.6)
MCH RBC QN AUTO: 25.8 PG (ref 26.8–34.3)
MCHC RBC AUTO-ENTMCNC: 30.9 G/DL (ref 31.4–37.4)
MCV RBC AUTO: 83 FL (ref 82–98)
MONOCYTES # BLD AUTO: 0.42 THOUSAND/ΜL (ref 0.17–1.22)
MONOCYTES NFR BLD AUTO: 7 % (ref 4–12)
NEUTROPHILS # BLD AUTO: 3.86 THOUSANDS/ΜL (ref 1.85–7.62)
NEUTS SEG NFR BLD AUTO: 62 % (ref 43–75)
NRBC BLD AUTO-RTO: 0 /100 WBCS
PLATELET # BLD AUTO: 236 THOUSANDS/UL (ref 149–390)
PMV BLD AUTO: 9.6 FL (ref 8.9–12.7)
POTASSIUM SERPL-SCNC: 3.6 MMOL/L (ref 3.5–5.3)
PROCALCITONIN SERPL-MCNC: 4.02 NG/ML
PROT SERPL-MCNC: 7.9 G/DL (ref 6.4–8.2)
RBC # BLD AUTO: 2.99 MILLION/UL (ref 3.88–5.62)
SODIUM SERPL-SCNC: 142 MMOL/L (ref 136–145)
WBC # BLD AUTO: 6.11 THOUSAND/UL (ref 4.31–10.16)

## 2020-07-05 PROCEDURE — 85025 COMPLETE CBC W/AUTO DIFF WBC: CPT | Performed by: STUDENT IN AN ORGANIZED HEALTH CARE EDUCATION/TRAINING PROGRAM

## 2020-07-05 PROCEDURE — 87110 CHLAMYDIA CULTURE: CPT | Performed by: FAMILY MEDICINE

## 2020-07-05 PROCEDURE — 93306 TTE W/DOPPLER COMPLETE: CPT

## 2020-07-05 PROCEDURE — 80053 COMPREHEN METABOLIC PANEL: CPT | Performed by: STUDENT IN AN ORGANIZED HEALTH CARE EDUCATION/TRAINING PROGRAM

## 2020-07-05 PROCEDURE — 99232 SBSQ HOSP IP/OBS MODERATE 35: CPT | Performed by: STUDENT IN AN ORGANIZED HEALTH CARE EDUCATION/TRAINING PROGRAM

## 2020-07-05 PROCEDURE — 93306 TTE W/DOPPLER COMPLETE: CPT | Performed by: INTERNAL MEDICINE

## 2020-07-05 PROCEDURE — 83735 ASSAY OF MAGNESIUM: CPT | Performed by: STUDENT IN AN ORGANIZED HEALTH CARE EDUCATION/TRAINING PROGRAM

## 2020-07-05 PROCEDURE — 99232 SBSQ HOSP IP/OBS MODERATE 35: CPT | Performed by: INTERNAL MEDICINE

## 2020-07-05 RX ORDER — HYDRALAZINE HYDROCHLORIDE 25 MG/1
75 TABLET, FILM COATED ORAL EVERY 8 HOURS SCHEDULED
Status: DISCONTINUED | OUTPATIENT
Start: 2020-07-05 | End: 2020-07-06 | Stop reason: HOSPADM

## 2020-07-05 RX ADMIN — AZITHROMYCIN 500 MG: 500 INJECTION, POWDER, LYOPHILIZED, FOR SOLUTION INTRAVENOUS at 11:35

## 2020-07-05 RX ADMIN — CARVEDILOL 25 MG: 12.5 TABLET, FILM COATED ORAL at 10:44

## 2020-07-05 RX ADMIN — HEPARIN SODIUM 5000 UNITS: 5000 INJECTION INTRAVENOUS; SUBCUTANEOUS at 21:57

## 2020-07-05 RX ADMIN — NIFEDIPINE 90 MG: 60 TABLET, FILM COATED, EXTENDED RELEASE ORAL at 10:44

## 2020-07-05 RX ADMIN — HYDRALAZINE HYDROCHLORIDE 75 MG: 25 TABLET ORAL at 15:00

## 2020-07-05 RX ADMIN — HEPARIN SODIUM 5000 UNITS: 5000 INJECTION INTRAVENOUS; SUBCUTANEOUS at 05:34

## 2020-07-05 RX ADMIN — HYDRALAZINE HYDROCHLORIDE 75 MG: 25 TABLET ORAL at 21:57

## 2020-07-05 RX ADMIN — CARVEDILOL 25 MG: 12.5 TABLET, FILM COATED ORAL at 15:43

## 2020-07-05 RX ADMIN — CALCITRIOL CAPSULES 0.25 MCG 0.5 MCG: 0.25 CAPSULE ORAL at 10:45

## 2020-07-05 RX ADMIN — Medication 1000 MG: at 10:53

## 2020-07-05 RX ADMIN — HYDRALAZINE HYDROCHLORIDE 50 MG: 25 TABLET ORAL at 05:33

## 2020-07-05 RX ADMIN — HEPARIN SODIUM 5000 UNITS: 5000 INJECTION INTRAVENOUS; SUBCUTANEOUS at 14:00

## 2020-07-05 NOTE — PROGRESS NOTES
Progress Note - Luke Isidro 1975, 39 y o  male MRN: [id number]    Unit/Bed#: -01 Encounter: [id number]    Primary Care Provider: Elizabeth Stoner DO   Date and time admitted to hospital: 7/3/2020 12:04 AM        Positive blood culture  Assessment & Plan  · Blood culture positive for gram + rods  · Could be contaminant but patient does have pneumonia and recent 101 fever on 7/3  · Unclear source, could be secondary to pneumonia but patient also does HD from home which could be source of infection if not performed in sterile environment   · Wait for cultures to process   · Consider ID consult for further evaluation    Hypoxia  Assessment & Plan  · Secondary to fluid overload  · Previously on 2-3 liters nasal canula  · Now resolved with dialysis    Community acquired pneumonia  Assessment & Plan  · Left upper lobe consolidation  · Continue IV antibiotics  · Elevated procalcitonin on admission, continue to trend    End-stage renal disease on hemodialysis [facility])  Assessment & Plan  · Continue with HD   · Nephrology following    * Acute congestive heart failure ([facility] [address] )  Assessment & Plan  Wt Readings from Last 3 Encounters:   07/05/20 98 3 kg (216 lb 11 4 oz)   06/24/20 105 kg (231 lb 14 8 oz)   06/15/20 108 kg (239 lb)     · Elevated BNP on arrival   · CXR showing vascular congestion  · Echocardiogram pending completion  · Volume overload in setting of ESRD  · Continue with coreg, monitor intake, daily weights  · Continue with dialysis           VTE Pharmacologic Prophylaxis:   Pharmacologic: Heparin  Mechanical VTE Prophylaxis in Place: Yes    Patient Centered Rounds: I have performed bedside rounds with nursing staff today  Discussions with Specialists or Other Care Team Provider: Nephrology    Time Spent for Care: 30 minutes  More than 50% of total time spent on counseling and coordination of care as described above      Current Length of Stay: 2 day(s)    Current Patient Status: Inpatient Certification Statement: The patient will continue to require additional inpatient hospital stay due to positive blood cultures     Discharge Plan: To be determined    Code Status: Level 1 - Full Code      Subjective:   Patient feels well today    Objective:     Vitals:   Temp (24hrs), Av 5 °F (36 9 °C), Min:98 1 °F (36 7 °C), Max:98 9 °F (37 2 °C)    Temp:  [98 1 °F (36 7 °C)-98 9 °F (37 2 °C)] 98 1 °F (36 7 °C)  HR:  [] 101  Resp:  [18] 18  BP: (160-177)/() 177/106  SpO2:  [90 %-100 %] 90 %  Body mass index is 31 09 kg/m²  Input and Output Summary (last 24 hours):     No intake or output data in the 24 hours ending 20 1454    Physical Exam:     Constitutional: He is oriented to person, place, and time  He appears well-developed and well-nourished  HENT:   Head: Normocephalic and atraumatic  Cardiovascular: Normal rate and regular rhythm  Pulmonary/Chest: He has rales  Abdominal: Soft  Bowel sounds are normal    Neurological: He is alert and oriented to person, place, and time  Skin: Skin is warm and dry  Psychiatric: He has a normal mood and affect   His behavior is normal      Additional Data:     Labs:    Results from last 7 days   Lab Units 20  0502   WBC Thousand/uL 6 11   HEMOGLOBIN g/dL 7 7*   HEMATOCRIT % 24 9*   PLATELETS Thousands/uL 236   NEUTROS PCT % 62   LYMPHS PCT % 25   MONOS PCT % 7   EOS PCT % 5     Results from last 7 days   Lab Units 20  0502   SODIUM mmol/L 142   POTASSIUM mmol/L 3 6   CHLORIDE mmol/L 101   CO2 mmol/L 26   BUN mg/dL 45*   CREATININE mg/dL 13 23*   ANION GAP mmol/L 15*   CALCIUM mg/dL 9 4   ALBUMIN g/dL 3 1*   TOTAL BILIRUBIN mg/dL 0 40   ALK PHOS U/L 390*   ALT U/L <6*   AST U/L 9   GLUCOSE RANDOM mg/dL 109                 Results from last 7 days   Lab Units 20  0957 20  0955 20  0200   LACTIC ACID mmol/L  --   --  1 0   PROCALCITONIN ng/ml 4 02* 3 44*  --            * I Have Reviewed All Lab Data Listed Above   * Additional Pertinent Lab Tests Reviewed: Oleg 66 Admission Reviewed    Imaging:    Imaging Reports Reviewed Today Include: na  Imaging Personally Reviewed by Myself Includes:  na    Recent Cultures (last 7 days):     Results from last 7 days   Lab Units 07/03/20  0211 07/03/20  0206   BLOOD CULTURE   --  No Growth at 48 hrs  GRAM STAIN RESULT  Gram positive rods*  --        Last 24 Hours Medication List:     Current Facility-Administered Medications:  acetaminophen 650 mg Oral Q6H PRN Darci Baldwin MD    azithromycin 500 mg Intravenous Q24H Darci Baldwin MD Last Rate: 500 mg (07/04/20 0930)   calcitriol 0 5 mcg Oral Daily Ian Gaines MD    carvedilol 25 mg Oral BID With Meals Darci Baldwin MD    cefTRIAXone 1,000 mg Intravenous Q24H Darci Baldwin MD Last Rate: 1,000 mg (07/05/20 1053)   heparin (porcine) 5,000 Units Subcutaneous Q8H [address] Darci Baldwin MD    hydrALAZINE 75 mg Oral Q8H [address] Ian Gaines MD    HYDROmorphone 1 mg Intravenous Q4H PRN CLARK Ponce    NIFEdipine 90 mg Oral Daily Darci Baldwin MD    oxyCODONE 10 mg Oral Q4H PRN CLARK Ponce    oxyCODONE 5 mg Oral Q4H PRN CLARK Ponce         Today, Patient Was Seen By: FAYE Turpin      ** Please Note: Dictation voice to text software may have been used in the creation of this document   **

## 2020-07-05 NOTE — ASSESSMENT & PLAN NOTE
Wt Readings from Last 3 Encounters:   07/05/20 98 3 kg (216 lb 11 4 oz)   06/24/20 105 kg (231 lb 14 8 oz)   06/15/20 108 kg (239 lb)     · Elevated BNP on arrival   · CXR showing vascular congestion  · Echocardiogram pending completion  · Volume overload in setting of ESRD  · Continue with coreg, monitor intake, daily weights  · Continue with dialysis

## 2020-07-05 NOTE — PROGRESS NOTES
NEPHROLOGY PROGRESS NOTE    Patient: Lazarus Drum               Sex: male          DOA: 7/3/2020 12:04 AM   YOB: 1975        Age:  39 y o         LOS:  LOS: 2 days   7/5/2020    REASON FOR THE CONSULTATION:      ESRD on H D    SUBJECTIVE     Patient is seen and examined next to the bedside  Continues to complain of cough though shortness of breath and overall well-being has improved  Denies any fever either  CURRENT MEDICATIONS       Current Facility-Administered Medications:     acetaminophen (TYLENOL) tablet 650 mg, 650 mg, Oral, Q6H PRN, Lenora Goldmann, MD    azithromycin (ZITHROMAX) 500 mg in sodium chloride 0 9% 250mL IVPB 500 mg, 500 mg, Intravenous, Q24H, Lenora Goldmann, MD, Last Rate: 250 mL/hr at 07/04/20 0930, 500 mg at 07/04/20 0930    calcitriol (ROCALTROL) capsule 0 5 mcg, 0 5 mcg, Oral, Daily, Xiang Cordero MD, 0 5 mcg at 07/05/20 1045    carvedilol (COREG) tablet 25 mg, 25 mg, Oral, BID With Meals, Lenora Goldmann, MD, 25 mg at 07/05/20 1044    ceftriaxone (ROCEPHIN) 1 g/50 mL in dextrose IVPB, 1,000 mg, Intravenous, Q24H, Lenora Goldmann, MD, Last Rate: 100 mL/hr at 07/05/20 1053, 1,000 mg at 07/05/20 1053    heparin (porcine) subcutaneous injection 5,000 Units, 5,000 Units, Subcutaneous, Q8H [address], Lenora Goldmann, MD, 5,000 Units at 07/05/20 0534    hydrALAZINE (APRESOLINE) tablet 50 mg, 50 mg, Oral, Q8H [address], Lenora Goldmann, MD, 50 mg at 07/05/20 0533    HYDROmorphone (DILAUDID) injection 1 mg, 1 mg, Intravenous, Q4H PRN, CLARK Martinez    NIFEdipine (PROCARDIA XL) 24 hr tablet 90 mg, 90 mg, Oral, Daily, Lenora Goldmann, MD, 90 mg at 07/05/20 1044    oxyCODONE (ROXICODONE) immediate release tablet 10 mg, 10 mg, Oral, Q4H PRN, CLARK Martinez    oxyCODONE (ROXICODONE) IR tablet 5 mg, 5 mg, Oral, Q4H PRN, CLARK Martinez    REVIEW OF SYSTEMS     Review of Systems   Constitutional: Negative  HENT: Negative  Eyes: Negative      Respiratory: Positive for cough  Cardiovascular: Negative  Gastrointestinal: Negative  Endocrine: Negative  Genitourinary: Negative  Musculoskeletal: Negative  Skin: Negative  Allergic/Immunologic: Negative  Neurological: Negative  Hematological: Negative  All other systems reviewed and are negative  OBJECTIVE     Current Weight: Weight - Scale: 98 3 kg (216 lb 11 4 oz)  Vitals:    07/05/20 1048   BP: (!) 177/106   Pulse:    Resp:    Temp:    SpO2:      Body mass index is 31 09 kg/m²  No intake or output data in the 24 hours ending 07/05/20 1121    PHYSICAL EXAMINATION     Physical Exam   Constitutional: He is oriented to person, place, and time  HENT:   Head: Normocephalic and atraumatic  Eyes: Pupils are equal, round, and reactive to light  Neck: Neck supple  No JVD present  Cardiovascular: Normal rate, regular rhythm and normal heart sounds  Exam reveals no friction rub  No murmur heard  Pulmonary/Chest: Effort normal  He has rales  Abdominal: Soft  Bowel sounds are normal  He exhibits no distension  There is no tenderness  There is no rebound  Musculoskeletal: He exhibits no edema or tenderness  Neurological: He is alert and oriented to person, place, and time  Skin: Skin is dry  No rash noted  Psychiatric: He has a normal mood and affect           LAB RESULTS     Results from last 7 days   Lab Units 07/05/20  0502 07/04/20  0644 07/03/20  0022   WBC Thousand/uL 6 11 7 28 7 24   HEMOGLOBIN g/dL 7 7* 7 8* 7 6*   HEMATOCRIT % 24 9* 25 6* 24 3*   PLATELETS Thousands/uL 236 216 207   POTASSIUM mmol/L 3 6 3 7 3 5   CHLORIDE mmol/L 101 101 97*   CO2 mmol/L 26 27 28   BUN mg/dL 45* 34* 45*   CREATININE mg/dL 13 23* 10 71* 12 65*   EGFR ml/min/1 73sq m 5 6 5   CALCIUM mg/dL 9 4 9 7 9 7   MAGNESIUM mg/dL 1 9  --   --            RADIOLOGY RESULTS      Results for orders placed during the hospital encounter of 07/03/20   XR chest 1 view portable    Narrative CHEST     INDICATION:   reports increased SOB and fluid retention  COMPARISON:  3/30/2020    EXAM PERFORMED/VIEWS:  XR CHEST PORTABLE  AP semierect       FINDINGS:  Stable single lead pacer/AICD device with generator on the left  Mild cardiomegaly  Mediastinal contour is normal     There is patchy consolidation in the left upper lobe  Lungs elsewhere appear clear although there are apparently more widespread opacities seen on the current CT scan  Osseous structures appear within normal limits for patient age  Impression Left upper lobe consolidation, most likely related to pneumonia  Please correlate with the current CT scan findings  Mild cardiomegaly  Workstation performed: [id number]       Results for orders placed during the hospital encounter of 03/30/20   X-ray chest 2 views    Narrative CHEST     INDICATION:   chest pain    COMPARISON: Chest radiograph from 12/19/2019  EXAM PERFORMED/VIEWS:  XR CHEST PA & LATERAL WITH DUAL ENERGY SUBTRACTION  FINDINGS:    Cardiomediastinal silhouette is normal  Left subclavian ICD lead in right ventricular apex  Lungs are clear  No effusion or pneumothorax  Osseous structures are within normal limits for age  Impression No acute cardiopulmonary disease  Workstation performed: [id number]         ASSESSMENT/PLAN       39 y o  M with PMH of HTN, ESRD on HD, secondary hyperparathyroidism of renal origin who p/w fever, shortness of breath and cough and found to be in volume overload and probable lung infection  1) ESRD on HD: Undergoes home HD for 4 days per week with 2 5 hour per session  Underwent 4 hours hemodialysis on July 3rd with 3 L ultrafiltration achieved   -plan for session of 4 hour HD in a [facility] 2) Community acquired pneumonia: + procalcitonin noted  On IV antibiotics and symptomatic improvement noted  -chest x-ray revealed yesterday revealed bilateral infiltrates  3) HTN due to ESRD: BP is slightly above target at 177/106    Will increase hydralazine to 75 mg t i d  -plan UF with HD in a m  Agustín Rhodes 4) Anemia due to ESRD: Hemoglobin of 7 8 and low  Improved from 7 6 yesterday  Receiving Epogen with HD           Iza Estrada MD  Nephrology  7/5/2020

## 2020-07-05 NOTE — ASSESSMENT & PLAN NOTE
· Blood culture positive for gram + rods  · Could be contaminant but patient does have pneumonia and recent 101 fever on 7/3  · Unclear source, could be secondary to pneumonia but patient also does HD from home which could be source of infection if not performed in sterile environment   · Wait for cultures to process   · Consider ID consult for further evaluation

## 2020-07-05 NOTE — ASSESSMENT & PLAN NOTE
· Left upper lobe consolidation  · Continue IV antibiotics  · Elevated procalcitonin on admission, continue to trend

## 2020-07-06 ENCOUNTER — APPOINTMENT (INPATIENT)
Dept: DIALYSIS | Facility: HOSPITAL | Age: 45
DRG: 194 | End: 2020-07-06
Payer: COMMERCIAL

## 2020-07-06 VITALS
DIASTOLIC BLOOD PRESSURE: 92 MMHG | WEIGHT: 218.7 LBS | TEMPERATURE: 98.4 F | RESPIRATION RATE: 16 BRPM | SYSTOLIC BLOOD PRESSURE: 171 MMHG | HEART RATE: 103 BPM | OXYGEN SATURATION: 99 % | BODY MASS INDEX: 31.31 KG/M2 | HEIGHT: 70 IN

## 2020-07-06 LAB
ALBUMIN SERPL BCP-MCNC: 3.2 G/DL (ref 3.5–5)
ALP SERPL-CCNC: 397 U/L (ref 46–116)
ALT SERPL W P-5'-P-CCNC: <6 U/L (ref 12–78)
ANION GAP SERPL CALCULATED.3IONS-SCNC: 15 MMOL/L (ref 4–13)
AST SERPL W P-5'-P-CCNC: 9 U/L (ref 5–45)
BASOPHILS # BLD AUTO: 0.04 THOUSANDS/ΜL (ref 0–0.1)
BASOPHILS NFR BLD AUTO: 1 % (ref 0–1)
BILIRUB SERPL-MCNC: 0.4 MG/DL (ref 0.2–1)
BUN SERPL-MCNC: 57 MG/DL (ref 5–25)
CALCIUM SERPL-MCNC: 9.4 MG/DL (ref 8.3–10.1)
CHLORIDE SERPL-SCNC: 101 MMOL/L (ref 100–108)
CO2 SERPL-SCNC: 27 MMOL/L (ref 21–32)
CREAT SERPL-MCNC: 15.75 MG/DL (ref 0.6–1.3)
EOSINOPHIL # BLD AUTO: 0.24 THOUSAND/ΜL (ref 0–0.61)
EOSINOPHIL NFR BLD AUTO: 5 % (ref 0–6)
ERYTHROCYTE [DISTWIDTH] IN BLOOD BY AUTOMATED COUNT: 16.1 % (ref 11.6–15.1)
GFR SERPL CREATININE-BSD FRML MDRD: 4 ML/MIN/1.73SQ M
GLUCOSE SERPL-MCNC: 92 MG/DL (ref 65–140)
HCT VFR BLD AUTO: 25.2 % (ref 36.5–49.3)
HGB BLD-MCNC: 7.9 G/DL (ref 12–17)
IMM GRANULOCYTES # BLD AUTO: 0.01 THOUSAND/UL (ref 0–0.2)
IMM GRANULOCYTES NFR BLD AUTO: 0 % (ref 0–2)
LYMPHOCYTES # BLD AUTO: 1.71 THOUSANDS/ΜL (ref 0.6–4.47)
LYMPHOCYTES NFR BLD AUTO: 32 % (ref 14–44)
MAGNESIUM SERPL-MCNC: 1.9 MG/DL (ref 1.6–2.6)
MCH RBC QN AUTO: 25.9 PG (ref 26.8–34.3)
MCHC RBC AUTO-ENTMCNC: 31.3 G/DL (ref 31.4–37.4)
MCV RBC AUTO: 83 FL (ref 82–98)
MONOCYTES # BLD AUTO: 0.3 THOUSAND/ΜL (ref 0.17–1.22)
MONOCYTES NFR BLD AUTO: 6 % (ref 4–12)
NEUTROPHILS # BLD AUTO: 3.09 THOUSANDS/ΜL (ref 1.85–7.62)
NEUTS SEG NFR BLD AUTO: 56 % (ref 43–75)
NRBC BLD AUTO-RTO: 0 /100 WBCS
PLATELET # BLD AUTO: 242 THOUSANDS/UL (ref 149–390)
PMV BLD AUTO: 9.4 FL (ref 8.9–12.7)
POTASSIUM SERPL-SCNC: 3.8 MMOL/L (ref 3.5–5.3)
PROCALCITONIN SERPL-MCNC: 3.18 NG/ML
PROT SERPL-MCNC: 8 G/DL (ref 6.4–8.2)
RBC # BLD AUTO: 3.05 MILLION/UL (ref 3.88–5.62)
SODIUM SERPL-SCNC: 143 MMOL/L (ref 136–145)
WBC # BLD AUTO: 5.39 THOUSAND/UL (ref 4.31–10.16)

## 2020-07-06 PROCEDURE — 5A1D70Z PERFORMANCE OF URINARY FILTRATION, INTERMITTENT, LESS THAN 6 HOURS PER DAY: ICD-10-PCS | Performed by: INTERNAL MEDICINE

## 2020-07-06 PROCEDURE — 83735 ASSAY OF MAGNESIUM: CPT | Performed by: STUDENT IN AN ORGANIZED HEALTH CARE EDUCATION/TRAINING PROGRAM

## 2020-07-06 PROCEDURE — 90935 HEMODIALYSIS ONE EVALUATION: CPT | Performed by: INTERNAL MEDICINE

## 2020-07-06 PROCEDURE — 85025 COMPLETE CBC W/AUTO DIFF WBC: CPT | Performed by: STUDENT IN AN ORGANIZED HEALTH CARE EDUCATION/TRAINING PROGRAM

## 2020-07-06 PROCEDURE — 80053 COMPREHEN METABOLIC PANEL: CPT | Performed by: STUDENT IN AN ORGANIZED HEALTH CARE EDUCATION/TRAINING PROGRAM

## 2020-07-06 PROCEDURE — 99239 HOSP IP/OBS DSCHRG MGMT >30: CPT | Performed by: STUDENT IN AN ORGANIZED HEALTH CARE EDUCATION/TRAINING PROGRAM

## 2020-07-06 PROCEDURE — 84145 PROCALCITONIN (PCT): CPT | Performed by: STUDENT IN AN ORGANIZED HEALTH CARE EDUCATION/TRAINING PROGRAM

## 2020-07-06 RX ORDER — HYDRALAZINE HYDROCHLORIDE 20 MG/ML
10 INJECTION INTRAMUSCULAR; INTRAVENOUS ONCE
Status: COMPLETED | OUTPATIENT
Start: 2020-07-06 | End: 2020-07-06

## 2020-07-06 RX ORDER — HYDRALAZINE HYDROCHLORIDE 25 MG/1
75 TABLET, FILM COATED ORAL EVERY 8 HOURS SCHEDULED
Qty: 270 TABLET | Refills: 0 | Status: SHIPPED | OUTPATIENT
Start: 2020-07-06 | End: 2021-10-25

## 2020-07-06 RX ORDER — CARVEDILOL 25 MG/1
25 TABLET ORAL 2 TIMES DAILY WITH MEALS
Qty: 60 TABLET | Refills: 0 | Status: SHIPPED | OUTPATIENT
Start: 2020-07-06 | End: 2021-10-25

## 2020-07-06 RX ORDER — CALCITRIOL 0.5 UG/1
0.5 CAPSULE, LIQUID FILLED ORAL DAILY
Qty: 30 CAPSULE | Refills: 0 | Status: SHIPPED | OUTPATIENT
Start: 2020-07-07 | End: 2020-10-29 | Stop reason: HOSPADM

## 2020-07-06 RX ORDER — CEFDINIR 300 MG/1
300 CAPSULE ORAL EVERY 24 HOURS
Qty: 3 CAPSULE | Refills: 0 | Status: SHIPPED | OUTPATIENT
Start: 2020-07-06 | End: 2020-07-09

## 2020-07-06 RX ORDER — BENZONATATE 100 MG/1
100 CAPSULE ORAL 3 TIMES DAILY PRN
Qty: 20 CAPSULE | Refills: 0 | Status: SHIPPED | OUTPATIENT
Start: 2020-07-06 | End: 2020-09-23 | Stop reason: HOSPADM

## 2020-07-06 RX ADMIN — AZITHROMYCIN 500 MG: 500 INJECTION, POWDER, LYOPHILIZED, FOR SOLUTION INTRAVENOUS at 12:13

## 2020-07-06 RX ADMIN — HYDRALAZINE HYDROCHLORIDE 75 MG: 25 TABLET ORAL at 06:21

## 2020-07-06 RX ADMIN — CALCITRIOL CAPSULES 0.25 MCG 0.5 MCG: 0.25 CAPSULE ORAL at 14:07

## 2020-07-06 RX ADMIN — HYDRALAZINE HYDROCHLORIDE 10 MG: 20 INJECTION INTRAMUSCULAR; INTRAVENOUS at 15:29

## 2020-07-06 RX ADMIN — NIFEDIPINE 90 MG: 60 TABLET, FILM COATED, EXTENDED RELEASE ORAL at 14:07

## 2020-07-06 RX ADMIN — HYDRALAZINE HYDROCHLORIDE 75 MG: 25 TABLET ORAL at 14:06

## 2020-07-06 RX ADMIN — Medication 1000 MG: at 12:14

## 2020-07-06 NOTE — PROGRESS NOTES
HEMODIALYSIS ROUNDING NOTE    Patient: Carlos Weber               Sex: male          DOA: 7/3/2020 12:04 AM   YOB: 1975        Age:  39 y o         LOS:  LOS: 3 days   7/6/2020    REASON FOR THE CONSULTATION:  Further management of ESRD    HPI     This is a 39 y o  male admitted for Acute congestive heart failure ([facility] [address] )     SUBJECTIVE     - Patient was seen during hemodialysis today  Patient denies nausea / vomiting / headache / dizziness / SOB / chest pain during hemodialysis    - Reviewed last 24 hrs events     CURRENT MEDICATIONS       Current Facility-Administered Medications:     acetaminophen (TYLENOL) tablet 650 mg, 650 mg, Oral, Q6H PRN, Yared Gardner MD    azithromycin (ZITHROMAX) 500 mg in sodium chloride 0 9% 250mL IVPB 500 mg, 500 mg, Intravenous, Q24H, Yared Gardner MD, Last Rate: 250 mL/hr at 07/04/20 0930, 500 mg at 07/05/20 1135    calcitriol (ROCALTROL) capsule 0 5 mcg, 0 5 mcg, Oral, Daily, Esther Pena MD, 0 5 mcg at 07/05/20 1045    carvedilol (COREG) tablet 25 mg, 25 mg, Oral, BID With Meals, Yared Gardner MD, 25 mg at 07/05/20 1543    ceftriaxone (ROCEPHIN) 1 g/50 mL in dextrose IVPB, 1,000 mg, Intravenous, Q24H, Yared Gardner MD, Last Rate: 100 mL/hr at 07/05/20 1053, 1,000 mg at 07/05/20 1053    heparin (porcine) subcutaneous injection 5,000 Units, 5,000 Units, Subcutaneous, Q8H [facility], Yared Gardner MD, 5,000 Units at 07/05/20 2157    hydrALAZINE (APRESOLINE) tablet 75 mg, 75 mg, Oral, Q8H [facility], Esther Pena MD, 75 mg at 07/06/20 5112    HYDROmorphone (DILAUDID) injection 1 mg, 1 mg, Intravenous, Q4H PRN, CLARK Ruano    NIFEdipine (PROCARDIA XL) 24 hr tablet 90 mg, 90 mg, Oral, Daily, Yared Gardner MD, 90 mg at 07/05/20 1044    oxyCODONE (ROXICODONE) immediate release tablet 10 mg, 10 mg, Oral, Q4H PRN, CLARK Ruano    oxyCODONE (ROXICODONE) IR tablet 5 mg, 5 mg, Oral, Q4H PRN, CLARK Ruano    OBJECTIVE     Current Weight: Weight - Scale: 99 2 kg (218 lb 11 1 oz)  Vitals:    07/06/20 1100   BP: (!) 196/113   Pulse: 101   Resp: 18   Temp:    SpO2:      Body mass index is 31 38 kg/m²  Intake/Output Summary (Last 24 hours) at 7/6/2020 1129  Last data filed at 7/6/2020 0900  Gross per 24 hour   Intake 440 ml   Output    Net 440 ml       PHYSICAL EXAMINATION     Physical Exam   Constitutional: He is oriented to person, place, and time  HENT:   Head: Normocephalic and atraumatic  Eyes: Pupils are equal, round, and reactive to light  Neck: Neck supple  No JVD present  Cardiovascular: Normal rate, regular rhythm and normal heart sounds  Exam reveals no friction rub  No murmur heard  Pulmonary/Chest: Effort normal and breath sounds normal    Abdominal: Soft  Bowel sounds are normal  He exhibits no distension  There is no tenderness  There is no rebound  Musculoskeletal: He exhibits no edema or tenderness  Neurological: He is alert and oriented to person, place, and time  Skin: Skin is dry  No rash noted  Psychiatric: He has a normal mood and affect  LAB RESULTS     Results from last 7 days   Lab Units 07/06/20  0438 07/05/20  0502 07/04/20  0644 07/03/20  0022   WBC Thousand/uL 5 39 6 11 7 28 7 24   HEMOGLOBIN g/dL 7 9* 7 7* 7 8* 7 6*   HEMATOCRIT % 25 2* 24 9* 25 6* 24 3*   PLATELETS Thousands/uL 242 236 216 207   POTASSIUM mmol/L 3 8 3 6 3 7 3 5   CHLORIDE mmol/L 101 101 101 97*   CO2 mmol/L 27 26 27 28   BUN mg/dL 57* 45* 34* 45*   CREATININE mg/dL 15 75* 13 23* 10 71* 12 65*   EGFR ml/min/1 73sq m 4 5 6 5   CALCIUM mg/dL 9 4 9 4 9 7 9 7   MAGNESIUM mg/dL 1 9 1 9  --   --        RADIOLOGY RESULTS     Results for orders placed during the hospital encounter of 07/03/20   XR chest 1 view portable    Narrative CHEST     INDICATION:   reports increased SOB and fluid retention      COMPARISON:  3/30/2020    EXAM PERFORMED/VIEWS:  XR CHEST PORTABLE  AP semierect       FINDINGS:  Stable single lead pacer/AICD device with generator on the left  Mild cardiomegaly  Mediastinal contour is normal     There is patchy consolidation in the left upper lobe  Lungs elsewhere appear clear although there are apparently more widespread opacities seen on the current CT scan  Osseous structures appear within normal limits for patient age  Impression Left upper lobe consolidation, most likely related to pneumonia  Please correlate with the current CT scan findings  Mild cardiomegaly  Workstation performed: [id number]       Results for orders placed during the hospital encounter of 07/03/20   XR chest pa & lateral    Narrative CHEST     INDICATION:   volume overolad  COMPARISON:  7/3/2020    EXAM PERFORMED/VIEWS:  XR CHEST PA & LATERAL      FINDINGS:    Cardiomediastinal silhouette appears unremarkable  Left AICD again noted  Persistent increasing left upper lung airspace opacity  Developing right upper and bibasilar airspace opacities     No pneumothorax or pleural effusion  Osseous structures appear within normal limits for patient age  Impression Worsening left upper lung consolidation with developing right upper and bibasilar infiltrates  The study was marked in [facility] for immediate notification  Workstation performed: [id number]         PLAN / RECOMMENDATIONS     1  End Stage Renal Disease  I saw and examined patient during hemodialysis treatment at 10:30 a m    The patient was receiving hemodialysis for treatment of end stage renal disease  I have also reviewed vital signs, intake and output, lab results and recent events, and agreed with today's dialysis order   -targeting 4 L ultrafiltration    2  Hypertension:  Blood pressure elevated and patient is clearly volume overloaded  3  Anemia:  Hemoglobin 7 9 and low  4: Bone mineral:    5   Access:  AV fistula    Disposition:     Fabio Underwood MD  Nephrology  7/6/2020

## 2020-07-06 NOTE — ASSESSMENT & PLAN NOTE
· Blood culture positive for gram + rods  · Likely contaminant  · Fever on 7/3 likely secondary to pneumonia  · Discussed findings with patient, instructed him to return to ED if he develops chills, fevers, night sweats, or other infectious symptoms  · Stable for discharge with cefdinir

## 2020-07-06 NOTE — HEMODIALYSIS
HD completed with no issues, good fistula performance  Goal of 4 0kg achieved, with no distress to Pt       Report to nurse  Jorge Lee RN

## 2020-07-06 NOTE — PLAN OF CARE
Problem: METABOLIC, FLUID AND ELECTROLYTES - ADULT  Goal: Electrolytes maintained within normal limits  Description  4000 ml fluid removal, 3 K bath, 4 hrs, using right lower arm AVF      INTERVENTIONS:  - Monitor labs and assess patient for signs and symptoms of electrolyte imbalances  - Administer electrolyte replacement as ordered  - Monitor response to electrolyte replacements, including repeat lab results as appropriate  - Instruct patient on fluid and nutrition as appropriate  Outcome: Progressing  Goal: Fluid balance maintained  Description  INTERVENTIONS:  - Monitor labs   - Monitor I/O and WT  - Instruct patient on fluid and nutrition as appropriate  - Assess for signs & symptoms of volume excess or deficit  Outcome: Progressing

## 2020-07-06 NOTE — ASSESSMENT & PLAN NOTE
· Continue with HD at home  · Dialyzed several times while admitted   · Nephrology will follow up as an outpatient

## 2020-07-06 NOTE — ASSESSMENT & PLAN NOTE
Wt Readings from Last 3 Encounters:   07/06/20 99 2 kg (218 lb 11 1 oz)   06/24/20 105 kg (231 lb 14 8 oz)   06/15/20 108 kg (239 lb)     · Elevated BNP on arrival   · CXR showing vascular congestion  · Echocardiogram showing EF 65% with grade 1 diastolic dysfunction  · Volume overload in setting of ESRD  · Continue with coreg, monitor intake, daily weights  · Continue with dialysis at home

## 2020-07-06 NOTE — DISCHARGE SUMMARY
Discharge- Osmany Jazzmine 1975, 39 y o  male MRN: [id number]    Unit/Bed#: -01 Encounter: [id number]    Primary Care Provider: Willie Lebron DO   Date and time admitted to hospital: 7/3/2020 12:04 AM        Positive blood culture  Assessment & Plan  · Blood culture positive for gram + rods  · Likely contaminant  · Fever on 7/3 likely secondary to pneumonia  · Discussed findings with patient, instructed him to return to ED if he develops chills, fevers, night sweats, or other infectious symptoms  · Stable for discharge with cefdinir     Hypoxia  Assessment & Plan  · Secondary to fluid overload  · Previously on 2-3 liters nasal canula  · Now resolved with dialysis    Community acquired pneumonia  Assessment & Plan  · Left upper lobe consolidation  · Elevated procalcitonin on admission  · Received 4 days of IV antibiotics, transitioned to Cefdinir 300 mg daily x 3 days to complete total of 7 day course    End-stage renal disease on hemodialysis [facility])  Assessment & Plan  · Continue with HD at home  · Dialyzed several times while admitted   · Nephrology will follow up as an outpatient    * Acute congestive heart failure ([facility] [address] )  Assessment & Plan  Wt Readings from Last 3 Encounters:   07/06/20 99 2 kg (218 lb 11 1 oz)   06/24/20 105 kg (231 lb 14 8 oz)   06/15/20 108 kg (239 lb)     · Elevated BNP on arrival   · CXR showing vascular congestion  · Echocardiogram showing EF 65% with grade 1 diastolic dysfunction  · Volume overload in setting of ESRD  · Continue with coreg, monitor intake, daily weights  · Continue with dialysis at home       Discharging Physician / Practitioner: Kaylah Resendiz MD  PCP: Willie Lebron DO  Admission Date:   Admission Orders (From admission, onward)     Ordered        07/03/20 1642  Inpatient Admission  Once         07/03/20 0334  Place in Observation  Once                   Discharge Date: 07/06/20    Disposition:      Other: Home    For Discharges to [facility]:   · Not Applicable to this Patient - Not Applicable to this Patient    Reason for Admission: Shortness of breath    Discharge Diagnoses:     Please see assessment and plan section above for further details regarding discharge diagnoses  Resolved Problems  Date Reviewed: 7/3/2020    None          Consultations During Hospital Stay:  · Nephrology    Procedures Performed:   · Dialysis, imaging     Medication Adjustments and Discharge Medications:  · Summary of Medication Adjustments made as a result of this hospitalization: See med rec  · Medication Dosing Tapers - Please refer to Discharge Medication List for details on any medication dosing tapers (if applicable to patient)  · Medications being temporarily held (include recommended restart time): See med rec  · Discharge Medication List: See after visit summary for reconciled discharge medications  Wound Care Recommendations:  When applicable, please see wound care section of After Visit Summary  Diet Recommendations at Discharge:  Diet -        Diet Orders   (From admission, onward)             Start     Ordered    07/03/20 0846  Diet Renal; Potassium 2 GM; Yes; Fluid Restriction 1200 ML; No  Diet effective now     Question Answer Comment   Diet Type Renal    Renal Potassium 2 GM    Should patient have a fluid restriction? Yes    Fluid Restriction Fluid Restriction 1200 ML    Should patient have a protein modifier? No    RD to adjust diet per protocol? Yes        07/03/20 0851                Instructions for any Catheters / Lines Present at Discharge (including removal date, if applicable):  Standard AV fistula care    Significant Findings / Test Results:   · Left upper lobe pneumonia  · Poorly controlled hypertension    Incidental Findings:   · Contaminant blood culture     Test Results Pending at Discharge (will require follow up):    · None     Outpatient Tests Requested:  · Follow-up with outpatient Nephrology    Complications:  None    Hospital Course:     Hugh Goltz is a 39 y o  male patient who originally presented to the hospital on 7/3/2020 due to shortness of breath, suspected to be secondary to CHF exacerbation from fluid overload which resolved with dialysis  Patient does dialysis at home, was not adjusting his dialysis sessions based on weight which is likely what contributed to him presenting with fluid overload  Also found to have left upper lobe pneumonia which was treated with IV antibiotics for 4 days and now transitioned to cefdinir to complete 7 day course as an outpatient  There was a positive blood culture which was likely a contaminant, on 07/06/2020 patient was dialyzed 1 last time before being discharged home  Condition at Discharge: fair     Discharge Day Visit / Exam:     Subjective:  Patient feels well today has no complaints  Vitals: Blood Pressure: (!) 188/129 (07/06/20 1403)  Pulse: 99 (07/06/20 1200)  Temperature: 98 1 °F (36 7 °C) (07/06/20 0717)  Temp Source: Oral (07/05/20 2304)  Respirations: 16 (07/06/20 1200)  Height: 5' 10" (177 8 cm) (07/03/20 0400)  Weight - Scale: 99 2 kg (218 lb 11 1 oz) (07/06/20 0600)  SpO2: 99 % (07/05/20 2304)  Exam:   Constitutional: He is oriented to person, place, and time  He appears well-developed and well-nourished  HENT:   Head: Normocephalic and atraumatic  Cardiovascular: Normal rate and regular rhythm  Pulmonary/Chest: He has rales  Abdominal: Soft  Bowel sounds are normal    Neurological: He is alert and oriented to person, place, and time  Skin: Skin is warm and dry  Psychiatric: He has a normal mood and affect  His behavior is normal       Goals of Care Discussions:  · Code Status at Discharge: Level 1 - Full Code  · Were there any Goals of Care Discussions during Hospitalization?: No  · Results of any General Goals of Care Discussions: na  · POLST Completed: No   · If POLST Completed, Summary of POLST Agreement Provided Here: na   · OK to Rehospitalize if Needed? No    Discharge instructions/Information to patient and family:   See after visit summary section titled Discharge Instructions for information provided to patient and family  Planned Readmission: none      Discharge Statement:  I spent 30 minutes discharging the patient  This time was spent on the day of discharge  I had direct contact with the patient on the day of discharge  Greater than 50% of the total time was spent examining patient, answering all patient questions, arranging and discussing plan of care with patient as well as directly providing post-discharge instructions  Additional time then spent on discharge activities      ** Please Note: This note has been constructed using a voice recognition system **

## 2020-07-06 NOTE — UTILIZATION REVIEW
Notification of Inpatient Admission/Inpatient Authorization Request   This is a Notification of Inpatient Admission for [address]  Be advised that this patient was admitted to our facility under Inpatient Status  Contact Lopez De Los Santos at [phone] for additional admission information  [facility] DEPT DEDICATED Nicolle Recinos [phone]  Patient Name:   Osmany Dover   YOB: 1975       [address]   [address]:   [address]   Tax ID: [id number]  NPI: [id number] Attending Provider/NPI:  Phone:  Address: Marisol Koch [[id number]]  [phone]  Same as Facility   Place of Service Code: 24     [facility]   Start Date: 7/3/20 1642 Discharge Date & Time: No discharge date for patient encounter  Type of Admission: Inpatient Status Discharge Disposition   (if discharged): Home/Self Care   Patient Diagnoses: CHF (congestive heart failure) ([facility] [address] ) [I50 9]  Pneumonia [J18 9]     Orders: Admission Orders (From admission, onward)     Ordered        07/03/20 1642  Inpatient Admission  Once         07/03/20 0334  Place in Observation  Once                    Assigned Utilization Review Contact: Lopez De Los Santos  Utilization   Network Utilization Review Department  Phone: [phone]; Fax [phone]  Email: [email or web address]   ATTENTION PAYERS: Please call the assigned Utilization  directly with any questions or concerns ALL voicemails in the department are confidential  Send all requests for admission clinical reviews, approved or denied determinations and any other requests to dedicated fax number belonging to the campus where the patient is receiving treatment

## 2020-07-06 NOTE — ASSESSMENT & PLAN NOTE
· Left upper lobe consolidation  · Elevated procalcitonin on admission  · Received 4 days of IV antibiotics, transitioned to Cefdinir 300 mg daily x 3 days to complete total of 7 day course

## 2020-07-06 NOTE — SOCIAL WORK
CM met with pt at bedside  Pt lives with his daughter Cooper Huang in a 2 story house, but his bedroom/bathroom are on the first floor  There are 2 STARLA and pt has no problem navigating steps  He is independent with ADL's  He uses no DME's  He does home dialysis on M-T-Th-F   He has never been in rehab or used [facility] services  Denies substance abuse or mental health issues  He quit smoking 10 years ago  He uses [facility] in [facility] and has no co-pays that is over $1 00  Dr Derrick Yousif is his PCP  He does not have a POA or Advanced Directive and does not want info at this time  He does not work, but still drives  His girlfriend Jay Del Rosario will transport home when he is medically cleared  CM discussed d/c needs and pt is open to [facility] services with [facility]  Understands that he has freedom of choice  CM will continue to follow  CM reviewed discharge planning process including the following: identifying help at home, patient preference for discharge planning needs, pharmacy preference, and availability of treatment team to discuss questions or concerns patient and/or family may have regarding understanding medications and recognizing signs and symptoms once discharged  CM also encouraged patient to follow up with all recommended appointments after discharge  Patient advised of importance for patient and family to participate in managing patients medical well being

## 2020-07-07 NOTE — UTILIZATION REVIEW
Notification of Discharge  This is a Notification of Discharge from our facility [address]  Please be advised that this patient has been discharge from our facility  Below you will find the admission and discharge date and time including the patients disposition  PRESENTATION DATE: 7/3/2020 12:04 AM  OBS ADMISSION DATE: 07/03/2020  IP ADMISSION DATE: 7/3/20 1642   DISCHARGE DATE: 7/6/2020  5:00 PM  DISPOSITION: Home/Self Care Home/Self Care   Admission Orders listed below:  Admission Orders (From admission, onward)     Ordered        07/03/20 1642  Inpatient Admission  Once         07/03/20 0334  Place in Observation  Once                   Please contact the UR Department if additional information is required to close this patient's authorization/case  [address] [facility] Utilization Review Department  Main: [phone] x carefully listen to the prompts  All voicemails are confidential   [email or web address]  Send all requests for admission clinical reviews, approved or denied determinations and any other requests to dedicated fax number below belonging to the campus where the patient is receiving treatment   List of dedicated fax numbers:  [address] DENIALS (Administrative/Medical Necessity) [phone]   [address] (Maternity/NICU/Pediatrics) [phone]   [facility] [phone]   [facility] [phone]   Damir Terry [phone]   Kellie Bae [facility] [address] [facility] [phone]   [facility]  [phone]   [address] [facility] [address]  [address] [facility] [address] [facility] [phone]

## 2020-07-08 LAB
BACTERIA BLD CULT: ABNORMAL
BACTERIA BLD CULT: NORMAL
C TRACH SPEC QL CULT: NEGATIVE
GRAM STN SPEC: ABNORMAL

## 2020-07-21 LAB
HBA1C MFR BLD HPLC: 5.4 %
HCV AB SER-ACNC: NEGATIVE

## 2020-08-11 ENCOUNTER — CONSULT (OUTPATIENT)
Dept: SURGERY | Facility: CLINIC | Age: 45
End: 2020-08-11
Payer: COMMERCIAL

## 2020-08-11 VITALS
DIASTOLIC BLOOD PRESSURE: 62 MMHG | HEART RATE: 93 BPM | HEIGHT: 70 IN | SYSTOLIC BLOOD PRESSURE: 110 MMHG | WEIGHT: 222.2 LBS | BODY MASS INDEX: 31.81 KG/M2 | TEMPERATURE: 98 F

## 2020-08-11 DIAGNOSIS — E21.3 HYPERPARATHYROIDISM (HCC): Primary | ICD-10-CM

## 2020-08-11 PROCEDURE — 99204 OFFICE O/P NEW MOD 45 MIN: CPT | Performed by: SURGERY

## 2020-08-11 NOTE — PROGRESS NOTES
Consult- General Surgery   Rupinder Allison 39 y o  male MRN: [id number]  Unit/Bed#:  Encounter: [id number]    Assessment/Plan     Assessment:  Hyperparathyroidism secondary to end-stage kidney disease  Hypertension  Diabetes  Plan:  I will get the most updated intact PTH from [address],, the patient will get an ultrasound of the neck and parathyroid nuclear studies  Further recommendations will be made pending on the results  History of Present Illness     HPI:  Rupinder Allison is a 39 y o  male who presents to my office for evaluation of hyperparathyroidism  Patient stated that he has been on hemodialysis for several years, he was also noted to have elevated PTH despite optimization of medical treatment  The patient would like preventive measures before having complications of hyperparathyroidism  Last intact PTH in our records was from Jan 17, 2020 and was 650  He stated that the levels had been increasing every month, I will get those records from [address], dialysis center  Patient denies any symptoms of depression, fractured bones or kidney stones  Review of Systems   Constitutional: Negative for chills and fever  HENT: Negative for nosebleeds and sore throat  Eyes: Negative for pain and discharge  Respiratory: Negative for cough and shortness of breath  Cardiovascular: Negative for chest pain and palpitations  Gastrointestinal: Negative for abdominal pain, blood in stool, constipation and diarrhea  Endocrine: Negative for cold intolerance and heat intolerance  Genitourinary: Negative for dysuria and hematuria  Neurological: Negative for seizures and headaches  Hematological: Negative for adenopathy  Does not bruise/bleed easily  Psychiatric/Behavioral: Negative for confusion  The patient is not nervous/anxious          Historical Information   Past Medical History:   Diagnosis Date    Chronic kidney disease     Hypertension     Renal disorder     dialysis      Past Surgical History: Procedure Laterality Date    IR FISTULA/GRAFTOGRAM  1/18/2019     Social History   Social History     Substance and Sexual Activity   Alcohol Use Never    Frequency: Never     Social History     Substance and Sexual Activity   Drug Use Never     Social History     Tobacco Use   Smoking Status Former Smoker    Last attempt to quit: 6/15/2010    Years since quitting: 10 1   Smokeless Tobacco Never Used     Family History: non-contributory    Meds/Allergies   all medications and allergies reviewed     Current Outpatient Medications:     benzonatate (TESSALON PERLES) 100 mg capsule, Take 1 capsule (100 mg total) by mouth 3 (three) times a day as needed for cough, Disp: 20 capsule, Rfl: 0    calcitriol (ROCALTROL) 0 5 MCG capsule, Take 1 capsule (0 5 mcg total) by mouth daily, Disp: 30 capsule, Rfl: 0    carvedilol (COREG) 25 mg tablet, Take 1 tablet (25 mg total) by mouth 2 (two) times a day with meals, Disp: 60 tablet, Rfl: 0    hydrALAZINE (APRESOLINE) 25 mg tablet, Take 3 tablets (75 mg total) by mouth every 8 (eight) hours, Disp: 270 tablet, Rfl: 0    NIFEdipine (PROCARDIA XL) 90 mg 24 hr tablet, Take 90 mg by mouth daily, Disp: , Rfl:   No Known Allergies    Objective     Current Vitals:   Blood Pressure: 110/62 (08/11/20 1413)  Pulse: 93 (08/11/20 1413)  Temperature: 98 °F (36 7 °C) (08/11/20 1413)  Height: 5' 10" (177 8 cm) (08/11/20 1413)  Weight - Scale: 101 kg (222 lb 3 2 oz) (08/11/20 1413)      Physical Exam  Vitals signs and nursing note reviewed  Constitutional:       General: He is not in acute distress  HENT:      Head: Normocephalic and atraumatic  Cardiovascular:      Rate and Rhythm: Tachycardia present  Heart sounds: No murmur  Pulmonary:      Effort: Pulmonary effort is normal  No respiratory distress  Breath sounds: Normal breath sounds  Comments: Left chest defibrillator  Abdominal:      General: Abdomen is flat  Palpations: There is no mass        Tenderness: There is no abdominal tenderness  Musculoskeletal:      Comments: Right arm fistula  Skin:     Findings: No erythema or rash  Neurological:      Mental Status: He is alert and oriented to person, place, and time  Cranial Nerves: No cranial nerve deficit  Psychiatric:         Mood and Affect: Mood normal          Thought Content: Thought content normal            Imaging: I have personally reviewed pertinent reports  Procedure: Xr Chest 1 View Portable    Result Date: 7/3/2020  Narrative: CHEST INDICATION:   reports increased SOB and fluid retention  COMPARISON:  3/30/2020 EXAM PERFORMED/VIEWS:  XR CHEST PORTABLE  AP semierect FINDINGS:  Stable single lead pacer/AICD device with generator on the left  Mild cardiomegaly  Mediastinal contour is normal  There is patchy consolidation in the left upper lobe  Lungs elsewhere appear clear although there are apparently more widespread opacities seen on the current CT scan  Osseous structures appear within normal limits for patient age  Impression: Left upper lobe consolidation, most likely related to pneumonia  Please correlate with the current CT scan findings  Mild cardiomegaly  Workstation performed: [id number]     Procedure: Xr Chest Pa & Lateral    Result Date: 7/4/2020  Narrative: CHEST INDICATION:   volume overolad  COMPARISON:  7/3/2020 EXAM PERFORMED/VIEWS:  XR CHEST PA & LATERAL FINDINGS: Cardiomediastinal silhouette appears unremarkable  Left AICD again noted  Persistent increasing left upper lung airspace opacity  Developing right upper and bibasilar airspace opacities     No pneumothorax or pleural effusion  Osseous structures appear within normal limits for patient age  Impression: Worsening left upper lung consolidation with developing right upper and bibasilar infiltrates  The study was marked in [facility] for immediate notification   Workstation performed: [id number]     Procedure: Ct Chest Without Contrast    Result Date: 7/3/2020  Narrative: CT CHEST WITHOUT IV CONTRAST INDICATION:   sob  COMPARISON:  None  TECHNIQUE: CT examination of the chest was performed without intravenous contrast   Axial, sagittal, and coronal 2D reformatted images were created from the source data and submitted for interpretation  Radiation dose length product (DLP) for this visit:  278 mGy-cm   This examination, like all CT scans performed in the [facility], was performed utilizing techniques to minimize radiation dose exposure, including the use of iterative reconstruction and automated exposure control  FINDINGS: LUNGS:  The trachea and central bronchial tree are patent  Diffuse airspace opacities are seen throughout the lungs (left greater than right)  PLEURA:  Trace left-sided pleural effusion is seen  HEART/GREAT VESSELS:  The heart is enlarged  Trace pericardial effusion is seen  MEDIASTINUM AND MAXIMILIAN:  Mediastinal lymph nodes measuring up to 1 3 cm are visualized  Limited evaluation the hilar region due to lack of intravenous contrast  CHEST WALL AND LOWER NECK:   Left-sided central venous catheter is seen  VISUALIZED STRUCTURES IN THE UPPER ABDOMEN:  Unremarkable  OSSEOUS STRUCTURES:  Increased sclerosis of the osseous structures is seen  Impression: Diffuse airspace opacities throughout the lungs which may represent infection versus pulmonary edema  Recommend short-term follow-up CT scan of the chest to evaluate for resolution in 3 months  The study was marked in EPIC for significant notification   Workstation performed: [id number]     EKG, Pathology, and Other Studies: I have personally reviewed pertinent films in PACS

## 2020-08-17 ENCOUNTER — HOSPITAL ENCOUNTER (OUTPATIENT)
Dept: NUCLEAR MEDICINE | Facility: HOSPITAL | Age: 45
Discharge: HOME | End: 2020-08-17
Attending: SURGERY
Payer: COMMERCIAL

## 2020-08-17 DIAGNOSIS — E21.3 HYPERPARATHYROIDISM (HCC): ICD-10-CM

## 2020-08-17 PROCEDURE — 78071 PARATHYRD PLANAR W/WO SUBTRJ: CPT

## 2020-08-17 PROCEDURE — A9500 TC99M SESTAMIBI: HCPCS

## 2020-08-17 PROCEDURE — G1004 CDSM NDSC: HCPCS

## 2020-08-22 ENCOUNTER — HOSPITAL ENCOUNTER (OUTPATIENT)
Dept: ULTRASOUND IMAGING | Facility: HOSPITAL | Age: 45
Discharge: HOME | End: 2020-08-22
Attending: SURGERY
Payer: COMMERCIAL

## 2020-08-22 DIAGNOSIS — E21.3 HYPERPARATHYROIDISM (HCC): ICD-10-CM

## 2020-08-22 PROCEDURE — 76536 US EXAM OF HEAD AND NECK: CPT

## 2020-09-03 ENCOUNTER — TELEPHONE (OUTPATIENT)
Dept: HEMATOLOGY ONCOLOGY | Facility: CLINIC | Age: 45
End: 2020-09-03

## 2020-09-03 ENCOUNTER — OFFICE VISIT (OUTPATIENT)
Dept: SURGERY | Facility: CLINIC | Age: 45
End: 2020-09-03
Payer: COMMERCIAL

## 2020-09-03 VITALS
SYSTOLIC BLOOD PRESSURE: 130 MMHG | HEART RATE: 84 BPM | WEIGHT: 228.4 LBS | BODY MASS INDEX: 32.7 KG/M2 | TEMPERATURE: 97.7 F | DIASTOLIC BLOOD PRESSURE: 74 MMHG | HEIGHT: 70 IN

## 2020-09-03 DIAGNOSIS — E21.3 HYPERPARATHYROIDISM (HCC): Primary | ICD-10-CM

## 2020-09-03 PROCEDURE — 99214 OFFICE O/P EST MOD 30 MIN: CPT | Performed by: SURGERY

## 2020-09-03 NOTE — PROGRESS NOTES
Follow Up - General Surgery   Emmalene Krabbe 39 y o  male MRN: [id number]  Unit/Bed#:  Encounter: [id number]    Assessment/Plan     Assessment:  Secondary hyperparathyroidism  Chronic kidney disease on hemodialysis  Hypertension  Diabetes  Plan:  After reviewing patient's lab, elevated intact PTH, ultrasound of the neck and parathyroid nuclear studies I recommend the patient to see Dr Santiago Herrera in [place] for 2nd opinion and possible definitive treatment  He is in complete agreement  History of Present Illness     HPI:  Emmalene Krabbe is a 39 y o  male who presents to my office for follow-up  The patient is known to me from prior office visit for secondary hyperparathyroidism  The patient has markedly elevated intact PTH in the 2000s for the past several months  He started to complain of bone aches, denies any history of depression or kidney stones  Patient would like something done about the elevated PTH  I reviewed the parathyroid nuclear report and ultrasound of the neck report with the patient in the office  Review of Systems   All other systems reviewed and are negative        Historical Information   Past Medical History:   Diagnosis Date    Chronic kidney disease     Hypertension     Renal disorder     dialysis      Past Surgical History:   Procedure Laterality Date    IR AV FISTULAGRAM/GRAFTOGRAM  1/18/2019     Social History   Social History     Substance and Sexual Activity   Alcohol Use Never    Frequency: Never     Social History     Substance and Sexual Activity   Drug Use Never     Social History     Tobacco Use   Smoking Status Former Smoker    Last attempt to quit: 6/15/2010    Years since quitting: 10 2   Smokeless Tobacco Never Used     Family History: non-contributory    Meds/Allergies   all medications and allergies reviewed     Current Outpatient Medications:     benzonatate (TESSALON PERLES) 100 mg capsule, Take 1 capsule (100 mg total) by mouth 3 (three) times a day as needed for cough, Disp: 20 capsule, Rfl: 0    calcitriol (ROCALTROL) 0 5 MCG capsule, Take 1 capsule (0 5 mcg total) by mouth daily, Disp: 30 capsule, Rfl: 0    carvedilol (COREG) 25 mg tablet, Take 1 tablet (25 mg total) by mouth 2 (two) times a day with meals, Disp: 60 tablet, Rfl: 0    hydrALAZINE (APRESOLINE) 25 mg tablet, Take 3 tablets (75 mg total) by mouth every 8 (eight) hours, Disp: 270 tablet, Rfl: 0    NIFEdipine (PROCARDIA XL) 90 mg 24 hr tablet, Take 90 mg by mouth daily, Disp: , Rfl:   No Known Allergies    Objective     Current Vitals:   Blood Pressure: 130/74 (09/03/20 0901)  Pulse: 84 (09/03/20 0901)  Temperature: 97 7 °F (36 5 °C) (09/03/20 0901)  Temp Source: Temporal (09/03/20 0901)  Height: 5' 10" (177 8 cm) (09/03/20 0901)  Weight - Scale: 104 kg (228 lb 6 4 oz) (09/03/20 0901)      Physical Exam  Vitals signs and nursing note reviewed  Constitutional:       General: He is not in acute distress  Neck:      Comments: No thyromegaly  Cardiovascular:      Rate and Rhythm: Normal rate and regular rhythm  Heart sounds: No murmur  Pulmonary:      Effort: Pulmonary effort is normal  No respiratory distress  Breath sounds: Normal breath sounds  Abdominal:      Palpations: Abdomen is soft  There is no mass  Tenderness: There is no abdominal tenderness  Lymphadenopathy:      Cervical: No cervical adenopathy  Skin:     Coloration: Skin is not jaundiced  Findings: No erythema or rash  Neurological:      Mental Status: He is alert and oriented to person, place, and time  Cranial Nerves: No cranial nerve deficit  Psychiatric:         Mood and Affect: Mood normal          Behavior: Behavior normal          Imaging: I have personally reviewed pertinent reports  Procedure: Us Thyroid    Result Date: 8/26/2020  Narrative: THYROID ULTRASOUND INDICATION:    E21 3: Hyperparathyroidism, unspecified   COMPARISON:  Parathyroid scan 8/17/2020 TECHNIQUE:   Ultrasound of the thyroid was performed with a high frequency linear transducer in transverse and sagittal planes including volumetric imaging sweeps as well as traditional still imaging technique  FINDINGS:  Normal homogeneous smooth echotexture  Right lobe:  6 5 x 1 9 x 2 3 cm  Left lobe:  5 7 x 2 1 x 2 6 cm  Isthmus:  0 5 cm  Nodule #1  Image 19  RIGHT lower pole anterior hypervascular nodule measuring 1 3 x 0 7 x 1 3 cm  COMPOSITION:  1 point, mixed cystic and solid  Central heterogeneous cystic region with solid peripheral component  ECHOGENICITY:  1 point, hyperechoic or isoechoic  SHAPE:  0 points, wider-than-tall  MARGIN: 0 points, ill-defined  ECHOGENIC FOCI:  0 points, none or large comet-tail artifacts  TI-RADS Classification: TR 2 (2 points), Not suspious  No FNA  Nodule #2  Image 45  LEFT lower pole posterior nodule measuring 0 8 x 0 8 x 0 8 cm  COMPOSITION:  1 point, mixed cystic and solid  Central cystic region with thin solid peripheral component  ECHOGENICITY:  1 point, hyperechoic or isoechoic  SHAPE:  3 points, taller-than-wide  MARGIN: 0 points, ill-defined  ECHOGENIC FOCI:  0 points, none or large comet-tail artifacts  TI-RADS Classification: TR 4 (4-6 points), FNA if > 1 5 cm  Follow if > 1cm  Nodule #3  Image 49  LEFT lower pole anterior nodule measuring 1 2 x 0 9 x 1 1 cm  COMPOSITION:  1 point, mixed cystic and solid  ECHOGENICITY:  1 point, hyperechoic or isoechoic  SHAPE:  0 points, wider-than-tall  MARGIN: 0 points, ill-defined  ECHOGENIC FOCI:  0 points, none or large comet-tail artifacts  TI-RADS Classification: TR 2 (2 points), Not suspious  No FNA  Impression: 1  There is one cystic and solid nodule at the level of the right inferior pole, and 2 cystic and solid nodules at the level of the left inferior pole  These likely correspond with the persistent nodular radiotracer uptake seen on recent parathyroid scan    These appear somewhat distinct from the adjacent thyroid and may rather than parathyroid adenomas, however exophytic thyroid nodules are not excluded  Consider tissue sampling if clinically warranted  Reference: ACR Thyroid Imaging, Reporting and Data System (TI-RADS): White Paper of the [employer]  J AM Vidal Radiol 5277;21:097-976  (additional recommendations based on American Thyroid Association 2015 guidelines ) Workstation performed: [id number]     Procedure: Nm Parathyroid Scan W Spect    Result Date: 8/17/2020  Narrative: PARATHYROID SCAN INDICATION:  E21 3: Hyperparathyroidism, unspecified COMPARISON:  CT chest 7/3/2020 TECHNIQUE:   Following the intravenous administration of 23 6 mCi Tc-99m Cardiolite, anterior and bilateral anterior oblique projection images of the neck and mediastinum were obtained at approximately 10 minutes post injection followed at 2 hours post injection by static anterior and bilateral oblique projections as well as SPECT images in coronal, sagittal and axial projections  FINDINGS: Early images demonstrate symmetric radiotracer uptake in the thyroid gland  Delayed images demonstrated washout of thyroid gland activity  Mild foci of radiotracer uptake noted inferior to the left and right lobes of the thyroid gland  SPECT images mild foci of radiotracer uptake inferior to the left and right lobes of the thyroid gland  This is slightly more intense on the left  Impression: 1  Small foci of radiotracer uptake inferior to the left and right lobes of the thyroid which may represent parathyroid adenomas  Consider further evaluation with ultrasound or dedicated CT   Workstation performed: [id number]     EKG, Pathology, and Other Studies: I have personally reviewed pertinent films in PACS

## 2020-09-03 NOTE — TELEPHONE ENCOUNTER
New Patient Encounter    New Patient Intake Form   Patient Details:  Fiona Loera  1975  [id number]    Background Information:  [address] starts by opening a telephone encounter and gathering the following information   Who is calling to schedule? If not self, relationship to patient? office   Referring Provider Eric Dent   What is the diagnosis? hyperparathyroid   Is this diagnosis confirmed? Yes   When was the diagnosis? 8/2020   Is there a confirmed diagnosis from a biopsy/tissue reviewed by pathology? no   Were outside slides requested? NA   Is patient aware of diagnosis? Yes   Is there a personal history and what kind? no   Is there a family history and what kind? no   Reason for visit? New Diagnosis   Have you had any imaging or labs done? If so: when, where? yes  [facility]   Are records in [place]? yes   If patient has a prior history of breast cancer were old records obtained? NA   Was the patient told to bring a disk? no   Does the patient smoke or Vape? If yes, how many packs or cartridges per day? Scheduling Information:   Preferred [place]: [facility]     Are there any dates/time the patient cannot be seen? Miscellaneous:    After completing the above information, please route to Financial Counselor and the appropriate Nurse Navigator for review

## 2020-09-03 NOTE — PROGRESS NOTES
Assessment/Plan:    No problem-specific Assessment & Plan notes found for this encounter  Subjective: NM Parathyroid      Patient ID: Grisel Denise is a 39 y o  male  Objective: There were no vitals taken for this visit           Physical Exam

## 2020-09-22 ENCOUNTER — TELEPHONE (OUTPATIENT)
Dept: SURGICAL ONCOLOGY | Facility: CLINIC | Age: 45
End: 2020-09-22

## 2020-09-22 PROBLEM — E21.3 HYPERPARATHYROIDISM (HCC): Status: ACTIVE | Noted: 2020-09-22

## 2020-09-23 ENCOUNTER — PREP FOR PROCEDURE (OUTPATIENT)
Dept: SURGICAL ONCOLOGY | Facility: CLINIC | Age: 45
End: 2020-09-23

## 2020-09-23 ENCOUNTER — CONSULT (OUTPATIENT)
Dept: SURGICAL ONCOLOGY | Facility: CLINIC | Age: 45
End: 2020-09-23
Payer: COMMERCIAL

## 2020-09-23 VITALS
TEMPERATURE: 98.7 F | BODY MASS INDEX: 30.8 KG/M2 | RESPIRATION RATE: 16 BRPM | HEART RATE: 93 BPM | SYSTOLIC BLOOD PRESSURE: 118 MMHG | HEIGHT: 71 IN | WEIGHT: 220 LBS | DIASTOLIC BLOOD PRESSURE: 80 MMHG

## 2020-09-23 DIAGNOSIS — E21.3 HYPERPARATHYROIDISM (HCC): Primary | ICD-10-CM

## 2020-09-23 PROCEDURE — 99244 OFF/OP CNSLTJ NEW/EST MOD 40: CPT | Performed by: SURGERY

## 2020-09-23 RX ORDER — TRAMADOL HYDROCHLORIDE 50 MG/1
50 TABLET ORAL 3 TIMES DAILY
Qty: 5 TABLET | Refills: 0 | Status: SHIPPED | OUTPATIENT
Start: 2020-09-23 | End: 2020-11-25

## 2020-09-23 NOTE — PROGRESS NOTES
Surgical Oncology Consult       [address] [place]  CANCER [facility] [place]  [address]  [place]  [facility] [address]    Jermaine Murphy  1975  [id number]  [address] [facility] [place]  [address] [facility] [address]    Chief Complaint   Patient presents with    Consult    Hyperparathyroidism       Assessment/Plan:    No problem-specific Assessment & Plan notes found for this encounter  Diagnoses and all orders for this visit:    Hyperparathyroidism ([address] )  -     CT parathyroid study w wo contrast; Future  -     traMADol (ULTRAM) 50 mg tablet; Take 1 tablet (50 mg total) by mouth 3 (three) times a day        Advance Care Planning/Advance Directives:  Discussed disease status, cancer treatment plans and/or cancer treatment goals with the patient  Oncology History    No history exists  History of Present Illness:  Patient is a 41-year-old man who was diagnosed with end-stage renal disease approximately 3 years ago  He was started on dialysis  Since then, he has developed what appears to be secondary hyperparathyroidism  He has been referred for evaluation and treatment  He complains of fatigue, severe muscle aches and pains, and limited mobility due to joint pain  No history of kidney stones  No issues with his mental status  No GI complaints  Review of Systems:  Review of Systems   Constitutional: Positive for fatigue  HENT: Negative  Eyes: Negative  Respiratory: Negative  Cardiovascular: Negative  Gastrointestinal: Negative  Endocrine: Negative  Musculoskeletal: Positive for arthralgias, back pain, gait problem, myalgias and neck pain  Skin: Negative  Neurological: Positive for weakness  Hematological: Negative  Psychiatric/Behavioral: Negative  Negative for decreased concentration  The patient is not nervous/anxious          Patient Active Problem List   Diagnosis  Acute congestive heart failure (HCC)    End-stage renal disease on hemodialysis ([facility] )    Hypertension    Community acquired pneumonia    Hypoxia    Positive blood culture    Hyperparathyroidism ([facility] )     Past Medical History:   Diagnosis Date    Chronic kidney disease     Hypertension     Renal disorder     dialysis      Past Surgical History:   Procedure Laterality Date    IR AV FISTULAGRAM/GRAFTOGRAM  1/18/2019     No family history on file    Social History     Socioeconomic History    Marital status: /Civil Union     Spouse name: Not on file    Number of children: Not on file    Years of education: Not on file    Highest education level: Not on file   Occupational History    Not on file   Social Needs    Financial resource strain: Not on file    Food insecurity     Worry: Not on file     Inability: Not on file    Transportation needs     Medical: Not on file     Non-medical: Not on file   Tobacco Use    Smoking status: Former Smoker     Last attempt to quit: 6/15/2010     Years since quitting: 10 2    Smokeless tobacco: Never Used   Substance and Sexual Activity    Alcohol use: Never     Frequency: Never    Drug use: Never    Sexual activity: Not on file   Lifestyle    Physical activity     Days per week: Not on file     Minutes per session: Not on file    Stress: Not on file   Relationships    Social connections     Talks on phone: Not on file     Gets together: Not on file     Attends Worship service: Not on file     Active member of club or organization: Not on file     Attends meetings of clubs or organizations: Not on file     Relationship status: Not on file    Intimate partner violence     Fear of current or ex partner: Not on file     Emotionally abused: Not on file     Physically abused: Not on file     Forced sexual activity: Not on file   Other Topics Concern    Not on file   Social History Narrative    Not on file       Current Outpatient Medications:    calcitriol (ROCALTROL) 0 5 MCG capsule, Take 1 capsule (0 5 mcg total) by mouth daily, Disp: 30 capsule, Rfl: 0    carvedilol (COREG) 25 mg tablet, Take 1 tablet (25 mg total) by mouth 2 (two) times a day with meals, Disp: 60 tablet, Rfl: 0    hydrALAZINE (APRESOLINE) 25 mg tablet, Take 3 tablets (75 mg total) by mouth every 8 (eight) hours, Disp: 270 tablet, Rfl: 0    NIFEdipine (PROCARDIA XL) 90 mg 24 hr tablet, Take 90 mg by mouth daily, Disp: , Rfl:     traMADol (ULTRAM) 50 mg tablet, Take 1 tablet (50 mg total) by mouth 3 (three) times a day, Disp: 5 tablet, Rfl: 0  No Known Allergies  Vitals:    09/23/20 0839   BP: 118/80   Pulse: 93   Resp: 16   Temp: 98 7 °F (37 1 °C)       Physical Exam  Constitutional:       Appearance: Normal appearance  HENT:      Head: Normocephalic and atraumatic  Nose: Nose normal    Eyes:      General: No scleral icterus  Extraocular Movements: Extraocular movements intact  Pupils: Pupils are equal, round, and reactive to light  Neck:      Musculoskeletal: Normal range of motion and neck supple  Cardiovascular:      Rate and Rhythm: Normal rate and regular rhythm  Heart sounds: Normal heart sounds  Pulmonary:      Effort: Pulmonary effort is normal       Breath sounds: Normal breath sounds  Abdominal:      General: Abdomen is flat  Palpations: Abdomen is soft  Musculoskeletal: Normal range of motion  Skin:     General: Skin is warm and dry  Neurological:      General: No focal deficit present  Mental Status: He is alert and oriented to person, place, and time  Psychiatric:         Mood and Affect: Mood normal          Behavior: Behavior normal          Thought Content:  Thought content normal          Judgment: Judgment normal            Results:  Labs:  Lab Results   Component Value Date    CALCIUM 9 4 07/06/2020     Outside: pth 650      Imaging  PARATHYROID SCAN     INDICATION:  E21 3: Hyperparathyroidism, unspecified     COMPARISON:  CT chest 7/3/2020     TECHNIQUE:   Following the intravenous administration of 23 6 mCi Tc-99m Cardiolite, anterior and bilateral anterior oblique projection images of the neck and mediastinum were obtained at approximately 10 minutes post injection followed at 2 hours post   injection by static anterior and bilateral oblique projections as well as SPECT images in coronal, sagittal and axial projections       FINDINGS:     Early images demonstrate symmetric radiotracer uptake in the thyroid gland      Delayed images demonstrated washout of thyroid gland activity  Mild foci of radiotracer uptake noted inferior to the left and right lobes of the thyroid gland      SPECT images mild foci of radiotracer uptake inferior to the left and right lobes of the thyroid gland  This is slightly more intense on the left      IMPRESSION:     1  Small foci of radiotracer uptake inferior to the left and right lobes of the thyroid which may represent parathyroid adenomas  Consider further evaluation with ultrasound or dedicated CT      Workstation performed: [id number]     THYROID ULTRASOUND     INDICATION:    E21 3: Hyperparathyroidism, unspecified      COMPARISON:  Parathyroid scan 8/17/2020     TECHNIQUE:   Ultrasound of the thyroid was performed with a high frequency linear transducer in transverse and sagittal planes including volumetric imaging sweeps as well as traditional still imaging technique      FINDINGS:  Normal homogeneous smooth echotexture      Right lobe:  6 5 x 1 9 x 2 3 cm  Left lobe:  5 7 x 2 1 x 2 6 cm  Isthmus:  0 5 cm      Nodule #1  Image 19  RIGHT lower pole anterior hypervascular nodule measuring 1 3 x 0 7 x 1 3 cm  COMPOSITION:  1 point, mixed cystic and solid  Central heterogeneous cystic region with solid peripheral component  ECHOGENICITY:  1 point, hyperechoic or isoechoic  SHAPE:  0 points, wider-than-tall  MARGIN: 0 points, ill-defined    ECHOGENIC FOCI:  0 points, none or large comet-tail artifacts  TI-RADS Classification: TR 2 (2 points), Not suspious  No FNA      Nodule #2  Image 45  LEFT lower pole posterior nodule measuring 0 8 x 0 8 x 0 8 cm  COMPOSITION:  1 point, mixed cystic and solid  Central cystic region with thin solid peripheral component  ECHOGENICITY:  1 point, hyperechoic or isoechoic  SHAPE:  3 points, taller-than-wide  MARGIN: 0 points, ill-defined  ECHOGENIC FOCI:  0 points, none or large comet-tail artifacts  TI-RADS Classification: TR 4 (4-6 points), FNA if > 1 5 cm  Follow if > 1cm      Nodule #3  Image 49  LEFT lower pole anterior nodule measuring 1 2 x 0 9 x 1 1 cm  COMPOSITION:  1 point, mixed cystic and solid  ECHOGENICITY:  1 point, hyperechoic or isoechoic  SHAPE:  0 points, wider-than-tall  MARGIN: 0 points, ill-defined  ECHOGENIC FOCI:  0 points, none or large comet-tail artifacts  TI-RADS Classification: TR 2 (2 points), Not suspious  No FNA           IMPRESSION:     1  There is one cystic and solid nodule at the level of the right inferior pole, and 2 cystic and solid nodules at the level of the left inferior pole  These likely correspond with the persistent nodular radiotracer uptake seen on recent parathyroid   scan  These appear somewhat distinct from the adjacent thyroid and may rather than parathyroid adenomas, however exophytic thyroid nodules are not excluded  Consider tissue sampling if clinically warranted               Reference: ACR Thyroid Imaging, Reporting and Data System (TI-RADS): White Paper of the [employer]  J AM Vidal Radiol 6957;58:166-793  (additional recommendations based on American Thyroid Association 2015 guidelines )        Workstation performed: [id number]    I reviewed the above laboratory and imaging data  Discussion/Summary:  79-year-old man, secondary hyperparathyroidism  Amenable to surgery    Will plan for 4 gland exploration, 3 and half gland parathyroidectomy  Rationale for this along with risks and benefits of surgery including infection, bleeding, need for possible additional surgery, discussed with patient  He understands the plan wishes to proceed as outlined  All questions answered  Consents signed at this visit  Will obtain preoperative CT scan to help guide surgery

## 2020-09-23 NOTE — LETTER
September 23, 2020     Dominic DO Jaclyn  [address] [address] [facility]  [facility]  [address]    Patient: Nathan Sánchez   YOB: 1975   Date of Visit: 9/23/2020       Dear Dr Ricardo Greer: Thank you for referring Nathan Sánchez to me for evaluation  Below are my notes for this consultation  If you have questions, please do not hesitate to call me  I look forward to following your patient along with you  Sincerely,        Mathew Patel MD        CC: MD Rosibel Ruiz MD Eliseo Coria, MD  9/23/2020  9:20 AM  Sign when Signing Visit     Surgical Oncology Consult       [address] [facility]  [address] [facility] [address]    Nathan Sánchez  1975  [id number]  [address] [place] [address],[address]  [facility] [place]  [address] [facility] [address]    Chief Complaint   Patient presents with    Consult    Hyperparathyroidism       Assessment/Plan:    No problem-specific Assessment & Plan notes found for this encounter  Diagnoses and all orders for this visit:    Hyperparathyroidism ([address] )  -     CT parathyroid study w wo contrast; Future  -     traMADol (ULTRAM) 50 mg tablet; Take 1 tablet (50 mg total) by mouth 3 (three) times a day        Advance Care Planning/Advance Directives:  Discussed disease status, cancer treatment plans and/or cancer treatment goals with the patient  Oncology History    No history exists  History of Present Illness:  Patient is a 44-year-old man who was diagnosed with end-stage renal disease approximately 3 years ago  He was started on dialysis  Since then, he has developed what appears to be secondary hyperparathyroidism  He has been referred for evaluation and treatment  He complains of fatigue, severe muscle aches and pains, and limited mobility due to joint pain  No history of kidney stones  No issues with his mental status    No GI complaints  Review of Systems:  Review of Systems   Constitutional: Positive for fatigue  HENT: Negative  Eyes: Negative  Respiratory: Negative  Cardiovascular: Negative  Gastrointestinal: Negative  Endocrine: Negative  Musculoskeletal: Positive for arthralgias, back pain, gait problem, myalgias and neck pain  Skin: Negative  Neurological: Positive for weakness  Hematological: Negative  Psychiatric/Behavioral: Negative  Negative for decreased concentration  The patient is not nervous/anxious  Patient Active Problem List   Diagnosis    Acute congestive heart failure ([place] [address] )    End-stage renal disease on hemodialysis ([place] [address] )    Hypertension    Community acquired pneumonia    Hypoxia    Positive blood culture    Hyperparathyroidism ([place] [address] )     Past Medical History:   Diagnosis Date    Chronic kidney disease     Hypertension     Renal disorder     dialysis      Past Surgical History:   Procedure Laterality Date    IR AV FISTULAGRAM/GRAFTOGRAM  1/18/2019     No family history on file    Social History     Socioeconomic History    Marital status: /Civil Union     Spouse name: Not on file    Number of children: Not on file    Years of education: Not on file    Highest education level: Not on file   Occupational History    Not on file   Social Needs    Financial resource strain: Not on file    Food insecurity     Worry: Not on file     Inability: Not on file    Transportation needs     Medical: Not on file     Non-medical: Not on file   Tobacco Use    Smoking status: Former Smoker     Last attempt to quit: 6/15/2010     Years since quitting: 10 2    Smokeless tobacco: Never Used   Substance and Sexual Activity    Alcohol use: Never     Frequency: Never    Drug use: Never    Sexual activity: Not on file   Lifestyle    Physical activity     Days per week: Not on file     Minutes per session: Not on file    Stress: Not on file   Relationships    Social connections     Talks on phone: Not on file     Gets together: Not on file     Attends Taoist service: Not on file     Active member of club or organization: Not on file     Attends meetings of clubs or organizations: Not on file     Relationship status: Not on file    Intimate partner violence     Fear of current or ex partner: Not on file     Emotionally abused: Not on file     Physically abused: Not on file     Forced sexual activity: Not on file   Other Topics Concern    Not on file   Social History Narrative    Not on file       Current Outpatient Medications:     calcitriol (ROCALTROL) 0 5 MCG capsule, Take 1 capsule (0 5 mcg total) by mouth daily, Disp: 30 capsule, Rfl: 0    carvedilol (COREG) 25 mg tablet, Take 1 tablet (25 mg total) by mouth 2 (two) times a day with meals, Disp: 60 tablet, Rfl: 0    hydrALAZINE (APRESOLINE) 25 mg tablet, Take 3 tablets (75 mg total) by mouth every 8 (eight) hours, Disp: 270 tablet, Rfl: 0    NIFEdipine (PROCARDIA XL) 90 mg 24 hr tablet, Take 90 mg by mouth daily, Disp: , Rfl:     traMADol (ULTRAM) 50 mg tablet, Take 1 tablet (50 mg total) by mouth 3 (three) times a day, Disp: 5 tablet, Rfl: 0  No Known Allergies  Vitals:    09/23/20 0839   BP: 118/80   Pulse: 93   Resp: 16   Temp: 98 7 °F (37 1 °C)       Physical Exam  Constitutional:       Appearance: Normal appearance  HENT:      Head: Normocephalic and atraumatic  Nose: Nose normal    Eyes:      General: No scleral icterus  Extraocular Movements: Extraocular movements intact  Pupils: Pupils are equal, round, and reactive to light  Neck:      Musculoskeletal: Normal range of motion and neck supple  Cardiovascular:      Rate and Rhythm: Normal rate and regular rhythm  Heart sounds: Normal heart sounds  Pulmonary:      Effort: Pulmonary effort is normal       Breath sounds: Normal breath sounds  Abdominal:      General: Abdomen is flat  Palpations: Abdomen is soft  Musculoskeletal: Normal range of motion  Skin:     General: Skin is warm and dry  Neurological:      General: No focal deficit present  Mental Status: He is alert and oriented to person, place, and time  Psychiatric:         Mood and Affect: Mood normal          Behavior: Behavior normal          Thought Content: Thought content normal          Judgment: Judgment normal            Results:  Labs:  Lab Results   Component Value Date    CALCIUM 9 4 07/06/2020     Outside: pth 650      Imaging  PARATHYROID SCAN     INDICATION:  E21 3: Hyperparathyroidism, unspecified     COMPARISON:  CT chest 7/3/2020     TECHNIQUE:   Following the intravenous administration of 23 6 mCi Tc-99m Cardiolite, anterior and bilateral anterior oblique projection images of the neck and mediastinum were obtained at approximately 10 minutes post injection followed at 2 hours post   injection by static anterior and bilateral oblique projections as well as SPECT images in coronal, sagittal and axial projections       FINDINGS:     Early images demonstrate symmetric radiotracer uptake in the thyroid gland      Delayed images demonstrated washout of thyroid gland activity  Mild foci of radiotracer uptake noted inferior to the left and right lobes of the thyroid gland      SPECT images mild foci of radiotracer uptake inferior to the left and right lobes of the thyroid gland  This is slightly more intense on the left      IMPRESSION:     1  Small foci of radiotracer uptake inferior to the left and right lobes of the thyroid which may represent parathyroid adenomas    Consider further evaluation with ultrasound or dedicated CT      Workstation performed: [id number]     THYROID ULTRASOUND     INDICATION:    E21 3: Hyperparathyroidism, unspecified      COMPARISON:  Parathyroid scan 8/17/2020     TECHNIQUE:   Ultrasound of the thyroid was performed with a high frequency linear transducer in transverse and sagittal planes including volumetric imaging sweeps as well as traditional still imaging technique      FINDINGS:  Normal homogeneous smooth echotexture      Right lobe:  6 5 x 1 9 x 2 3 cm  Left lobe:  5 7 x 2 1 x 2 6 cm  Isthmus:  0 5 cm      Nodule #1  Image 19  RIGHT lower pole anterior hypervascular nodule measuring 1 3 x 0 7 x 1 3 cm  COMPOSITION:  1 point, mixed cystic and solid  Central heterogeneous cystic region with solid peripheral component  ECHOGENICITY:  1 point, hyperechoic or isoechoic  SHAPE:  0 points, wider-than-tall  MARGIN: 0 points, ill-defined  ECHOGENIC FOCI:  0 points, none or large comet-tail artifacts  TI-RADS Classification: TR 2 (2 points), Not suspious  No FNA      Nodule #2  Image 45  LEFT lower pole posterior nodule measuring 0 8 x 0 8 x 0 8 cm  COMPOSITION:  1 point, mixed cystic and solid  Central cystic region with thin solid peripheral component  ECHOGENICITY:  1 point, hyperechoic or isoechoic  SHAPE:  3 points, taller-than-wide  MARGIN: 0 points, ill-defined  ECHOGENIC FOCI:  0 points, none or large comet-tail artifacts  TI-RADS Classification: TR 4 (4-6 points), FNA if > 1 5 cm  Follow if > 1cm      Nodule #3  Image 49  LEFT lower pole anterior nodule measuring 1 2 x 0 9 x 1 1 cm  COMPOSITION:  1 point, mixed cystic and solid  ECHOGENICITY:  1 point, hyperechoic or isoechoic  SHAPE:  0 points, wider-than-tall  MARGIN: 0 points, ill-defined  ECHOGENIC FOCI:  0 points, none or large comet-tail artifacts  TI-RADS Classification: TR 2 (2 points), Not suspious  No FNA           IMPRESSION:     1  There is one cystic and solid nodule at the level of the right inferior pole, and 2 cystic and solid nodules at the level of the left inferior pole  These likely correspond with the persistent nodular radiotracer uptake seen on recent parathyroid   scan    These appear somewhat distinct from the adjacent thyroid and may rather than parathyroid adenomas, however exophytic thyroid nodules are not excluded  Consider tissue sampling if clinically warranted               Reference: ACR Thyroid Imaging, Reporting and Data System (TI-RADS): White Paper of the [employer]  J AM Vidal [employer]iol 9904;93:401-226  (additional recommendations based on American Thyroid Association 2015 guidelines )        Workstation performed: [id number]    I reviewed the above laboratory and imaging data  Discussion/Summary:  66-year-old man, secondary hyperparathyroidism  Amenable to surgery  Will plan for 4 gland exploration, 3 and half gland parathyroidectomy  Rationale for this along with risks and benefits of surgery including infection, bleeding, need for possible additional surgery, discussed with patient  He understands the plan wishes to proceed as outlined  All questions answered  Consents signed at this visit  Will obtain preoperative CT scan to help guide surgery

## 2020-09-23 NOTE — PATIENT INSTRUCTIONS
Pre-Surgery Instructions:   Medication Instructions    calcitriol (ROCALTROL) 0 5 MCG capsule Stop taking 1 days prior to surgery    carvedilol (COREG) 25 mg tablet Take morning of surgery    hydrALAZINE (APRESOLINE) 25 mg tablet Take morning of surgery    NIFEdipine (PROCARDIA XL) 90 mg 24 hr tablet Take morning of surgery     Parathyroidectomy   WHAT YOU NEED TO KNOW:   A parathyroidectomy is surgery to remove part or all of your parathyroid glands  The parathyroid is made of 4 small glands that usually sit near the thyroid gland  The parathyroid glands make a hormone that controls the amount of calcium in your blood  DISCHARGE INSTRUCTIONS:   Medicines: You may need any of the following:  · NSAIDs  may decrease swelling and pain  This medicine can be bought with or without a doctor's order  This medicine can cause stomach bleeding or kidney problems in certain people  If you take blood thinner medicine, always ask your healthcare provider if NSAIDs are safe for you  Always read the medicine label and follow the directions on it before using this medicine  · Acetaminophen  decreases pain  It is available without a doctor's order  Ask how much to take and how often to take it  Follow directions  Acetaminophen can cause liver damage if not taken correctly  · Take your medicine as directed  Contact your healthcare provider if you think your medicine is not helping or if you have side effects  Tell him or her if you are allergic to any medicine  Keep a list of the medicines, vitamins, and herbs you take  Include the amounts, and when and why you take them  Bring the list or the pill bottles to follow-up visits  Carry your medicine list with you in case of an emergency  Follow up with your healthcare provider or surgeon as directed: You will need to return to have tests, your incision checked, and your drain or stitches removed   You may be referred to an endocrinologist  Write down your questions so you remember to ask them during your visits  Wound care:  Care for your wound as directed  You may need to carefully wash the wound with soap and water  Dry the area and put on new, clean bandages as directed  Change your bandages when they get wet or dirty  Check your drain when you change the bandages  Do not pull the drain out  Ask for more information about how to care for your drain  Rest as needed:  Slowly start to do more each day  Return to your daily activities as directed  Take supplements as directed: You may need to take calcium medicine to keep your blood calcium level normal  It may also help prevent and treat bone loss  Your healthcare provider may also tell you to take vitamin D to help your body absorb the calcium  Contact your healthcare provider or surgeon if:   · You have a fever  · Your wound is red, swollen, or draining pus  · You have pain in your neck area that does not go away, or gets worse even after you take your pain medicine  · You have chills, a cough, or feel weak and achy  · You have nausea or are vomiting  · You have questions or concerns about your condition or care  Seek care immediately or call 911 if:   · You cough up blood  · You feel lightheaded, short of breath, and have chest pain  · Your arm or leg feels warm, tender, and painful  It may look swollen and red  · You have trouble swallowing or talking, or you lose your voice  · You feel anxious, frightened, and uneasy  · You have the following symptoms of low blood calcium:     ¨ Confusion    ¨ Fatigue    ¨ Muscle spasms or muscle tightening    ¨ Numbness or tingling around your face, hands, or feet    ¨ A seizure  © 2017 [facility] Information is for End User's use only and may not be sold, redistributed or otherwise used for commercial purposes   All illustrations and images included in CareNotes® are the copyrighted property of [employer]  or [employer]  The above information is an  only  It is not intended as medical advice for individual conditions or treatments  Talk to your doctor, nurse or pharmacist before following any medical regimen to see if it is safe and effective for you

## 2020-09-30 ENCOUNTER — HOSPITAL ENCOUNTER (OUTPATIENT)
Dept: CT IMAGING | Facility: CLINIC | Age: 45
Discharge: HOME | End: 2020-09-30
Payer: COMMERCIAL

## 2020-09-30 DIAGNOSIS — E21.3 HYPERPARATHYROIDISM (HCC): ICD-10-CM

## 2020-09-30 PROCEDURE — 70492 CT SFT TSUE NCK W/O & W/DYE: CPT

## 2020-09-30 PROCEDURE — G1004 CDSM NDSC: HCPCS

## 2020-09-30 RX ADMIN — IOHEXOL 85 ML: 350 INJECTION, SOLUTION INTRAVENOUS at 12:48

## 2020-10-06 ENCOUNTER — APPOINTMENT (OUTPATIENT)
Dept: LAB | Facility: HOSPITAL | Age: 45
End: 2020-10-06
Attending: SURGERY
Payer: COMMERCIAL

## 2020-10-06 DIAGNOSIS — E21.3 HYPERPARATHYROIDISM (HCC): ICD-10-CM

## 2020-10-06 LAB
ALBUMIN SERPL BCP-MCNC: 3.7 G/DL (ref 3.5–5)
ALP SERPL-CCNC: 528 U/L (ref 46–116)
ALT SERPL W P-5'-P-CCNC: 13 U/L (ref 12–78)
ANION GAP SERPL CALCULATED.3IONS-SCNC: 15 MMOL/L (ref 4–13)
AST SERPL W P-5'-P-CCNC: 7 U/L (ref 5–45)
BASOPHILS # BLD AUTO: 0.05 THOUSANDS/ΜL (ref 0–0.1)
BASOPHILS NFR BLD AUTO: 1 % (ref 0–1)
BILIRUB SERPL-MCNC: 0.4 MG/DL (ref 0.2–1)
BUN SERPL-MCNC: 69 MG/DL (ref 5–25)
CALCIUM SERPL-MCNC: 9.3 MG/DL (ref 8.3–10.1)
CHLORIDE SERPL-SCNC: 100 MMOL/L (ref 100–108)
CO2 SERPL-SCNC: 27 MMOL/L (ref 21–32)
CREAT SERPL-MCNC: 16.44 MG/DL (ref 0.6–1.3)
EOSINOPHIL # BLD AUTO: 0.25 THOUSAND/ΜL (ref 0–0.61)
EOSINOPHIL NFR BLD AUTO: 4 % (ref 0–6)
ERYTHROCYTE [DISTWIDTH] IN BLOOD BY AUTOMATED COUNT: 17.9 % (ref 11.6–15.1)
GFR SERPL CREATININE-BSD FRML MDRD: 4 ML/MIN/1.73SQ M
GLUCOSE P FAST SERPL-MCNC: 107 MG/DL (ref 65–99)
HCT VFR BLD AUTO: 34.4 % (ref 36.5–49.3)
HGB BLD-MCNC: 10.3 G/DL (ref 12–17)
IMM GRANULOCYTES # BLD AUTO: 0.02 THOUSAND/UL (ref 0–0.2)
IMM GRANULOCYTES NFR BLD AUTO: 0 % (ref 0–2)
LYMPHOCYTES # BLD AUTO: 1.61 THOUSANDS/ΜL (ref 0.6–4.47)
LYMPHOCYTES NFR BLD AUTO: 28 % (ref 14–44)
MCH RBC QN AUTO: 26.7 PG (ref 26.8–34.3)
MCHC RBC AUTO-ENTMCNC: 29.9 G/DL (ref 31.4–37.4)
MCV RBC AUTO: 89 FL (ref 82–98)
MONOCYTES # BLD AUTO: 0.36 THOUSAND/ΜL (ref 0.17–1.22)
MONOCYTES NFR BLD AUTO: 6 % (ref 4–12)
NEUTROPHILS # BLD AUTO: 3.54 THOUSANDS/ΜL (ref 1.85–7.62)
NEUTS SEG NFR BLD AUTO: 61 % (ref 43–75)
NRBC BLD AUTO-RTO: 0 /100 WBCS
PLATELET # BLD AUTO: 254 THOUSANDS/UL (ref 149–390)
PMV BLD AUTO: 10 FL (ref 8.9–12.7)
POTASSIUM SERPL-SCNC: 6.1 MMOL/L (ref 3.5–5.3)
PROT SERPL-MCNC: 8.4 G/DL (ref 6.4–8.2)
RBC # BLD AUTO: 3.86 MILLION/UL (ref 3.88–5.62)
SODIUM SERPL-SCNC: 142 MMOL/L (ref 136–145)
WBC # BLD AUTO: 5.83 THOUSAND/UL (ref 4.31–10.16)

## 2020-10-06 PROCEDURE — 85025 COMPLETE CBC W/AUTO DIFF WBC: CPT

## 2020-10-06 PROCEDURE — 80053 COMPREHEN METABOLIC PANEL: CPT

## 2020-10-06 PROCEDURE — 36415 COLL VENOUS BLD VENIPUNCTURE: CPT

## 2020-10-12 DIAGNOSIS — E21.3 HYPERPARATHYROIDISM (HCC): ICD-10-CM

## 2020-10-12 PROCEDURE — U0003 INFECTIOUS AGENT DETECTION BY NUCLEIC ACID (DNA OR RNA); SEVERE ACUTE RESPIRATORY SYNDROME CORONAVIRUS 2 (SARS-COV-2) (CORONAVIRUS DISEASE [COVID-19]), AMPLIFIED PROBE TECHNIQUE, MAKING USE OF HIGH THROUGHPUT TECHNOLOGIES AS DESCRIBED BY CMS-2020-01-R: HCPCS | Performed by: SURGERY

## 2020-10-13 LAB — SARS-COV-2 RNA SPEC QL NAA+PROBE: NOT DETECTED

## 2020-10-16 ENCOUNTER — ANESTHESIA EVENT (OUTPATIENT)
Dept: PERIOP | Facility: HOSPITAL | Age: 45
DRG: 447 | End: 2020-10-16
Payer: COMMERCIAL

## 2020-10-22 ENCOUNTER — HOSPITAL ENCOUNTER (INPATIENT)
Facility: HOSPITAL | Age: 45
LOS: 6 days | Discharge: HOME | DRG: 447 | End: 2020-10-29
Attending: SURGERY | Admitting: SURGERY
Payer: COMMERCIAL

## 2020-10-22 ENCOUNTER — ANESTHESIA (OUTPATIENT)
Dept: PERIOP | Facility: HOSPITAL | Age: 45
DRG: 447 | End: 2020-10-22
Payer: COMMERCIAL

## 2020-10-22 VITALS — HEART RATE: 88 BPM

## 2020-10-22 DIAGNOSIS — N18.6 END-STAGE RENAL DISEASE ON HEMODIALYSIS (HCC): Primary | ICD-10-CM

## 2020-10-22 DIAGNOSIS — E83.51 HYPOCALCEMIA: ICD-10-CM

## 2020-10-22 DIAGNOSIS — E21.3 HYPERPARATHYROIDISM (HCC): ICD-10-CM

## 2020-10-22 DIAGNOSIS — Z99.2 END-STAGE RENAL DISEASE ON HEMODIALYSIS (HCC): Primary | ICD-10-CM

## 2020-10-22 DIAGNOSIS — T80.1XXD INTRAVENOUS INFILTRATION, SUBSEQUENT ENCOUNTER: ICD-10-CM

## 2020-10-22 PROBLEM — Z95.810 ICD (IMPLANTABLE CARDIOVERTER-DEFIBRILLATOR) IN PLACE: Status: ACTIVE | Noted: 2020-10-22

## 2020-10-22 LAB
CALCIUM SERPL-MCNC: 6.8 MG/DL (ref 8.3–10.1)
CALCIUM SERPL-MCNC: 8.1 MG/DL (ref 8.3–10.1)
POTASSIUM SERPL-SCNC: 5.3 MMOL/L (ref 3.5–5.3)
PTH-INTACT SERPL-MCNC: 1006.6 PG/ML (ref 18.4–80.1)
PTH-INTACT SERPL-MCNC: 1120.4 PG/ML (ref 18.4–80.1)
PTH-INTACT SERPL-MCNC: 1533.2 PG/ML (ref 18.4–80.1)
PTH-INTACT SERPL-MCNC: 1637.1 PG/ML (ref 18.4–80.1)
PTH-INTACT SERPL-MCNC: 1780 PG/ML (ref 18.4–80.1)
PTH-INTACT SERPL-MCNC: 559.7 PG/ML (ref 18.4–80.1)
PTH-INTACT SERPL-MCNC: 799.7 PG/ML (ref 18.4–80.1)
PTH-INTACT SERPL-MCNC: 870.5 PG/ML (ref 18.4–80.1)
PTH-INTACT SERPL-MCNC: >2000 PG/ML (ref 18.4–80.1)

## 2020-10-22 PROCEDURE — 88331 PATH CONSLTJ SURG 1 BLK 1SPC: CPT | Performed by: PATHOLOGY

## 2020-10-22 PROCEDURE — 99244 OFF/OP CNSLTJ NEW/EST MOD 40: CPT | Performed by: INTERNAL MEDICINE

## 2020-10-22 PROCEDURE — 0GBM0ZZ EXCISION OF LEFT SUPERIOR PARATHYROID GLAND, OPEN APPROACH: ICD-10-PCS | Performed by: SURGERY

## 2020-10-22 PROCEDURE — 83970 ASSAY OF PARATHORMONE: CPT | Performed by: SURGERY

## 2020-10-22 PROCEDURE — 88331 PATH CONSLTJ SURG 1 BLK 1SPC: CPT | Performed by: SURGERY

## 2020-10-22 PROCEDURE — 88305 TISSUE EXAM BY PATHOLOGIST: CPT | Performed by: PATHOLOGY

## 2020-10-22 PROCEDURE — 82310 ASSAY OF CALCIUM: CPT | Performed by: SURGERY

## 2020-10-22 PROCEDURE — 99024 POSTOP FOLLOW-UP VISIT: CPT | Performed by: SURGERY

## 2020-10-22 PROCEDURE — 84132 ASSAY OF SERUM POTASSIUM: CPT | Performed by: SURGERY

## 2020-10-22 PROCEDURE — 0GBN0ZZ EXCISION OF RIGHT INFERIOR PARATHYROID GLAND, OPEN APPROACH: ICD-10-PCS | Performed by: SURGERY

## 2020-10-22 PROCEDURE — 60500 EXPLORE PARATHYROID GLANDS: CPT | Performed by: SURGERY

## 2020-10-22 PROCEDURE — 0GBP0ZZ EXCISION OF LEFT INFERIOR PARATHYROID GLAND, OPEN APPROACH: ICD-10-PCS | Performed by: SURGERY

## 2020-10-22 RX ORDER — SODIUM CHLORIDE 9 MG/ML
INJECTION, SOLUTION INTRAVENOUS CONTINUOUS PRN
Status: DISCONTINUED | OUTPATIENT
Start: 2020-10-22 | End: 2020-10-22

## 2020-10-22 RX ORDER — HEPARIN SODIUM 5000 [USP'U]/ML
5000 INJECTION, SOLUTION INTRAVENOUS; SUBCUTANEOUS EVERY 8 HOURS SCHEDULED
Status: DISCONTINUED | OUTPATIENT
Start: 2020-10-22 | End: 2020-10-29 | Stop reason: HOSPADM

## 2020-10-22 RX ORDER — CALCIUM CARBONATE 200(500)MG
1000 TABLET,CHEWABLE ORAL 3 TIMES DAILY
Status: DISCONTINUED | OUTPATIENT
Start: 2020-10-22 | End: 2020-10-25

## 2020-10-22 RX ORDER — CALCITRIOL 1 UG/ML
0.5 INJECTION INTRAVENOUS ONCE
Status: COMPLETED | OUTPATIENT
Start: 2020-10-22 | End: 2020-10-22

## 2020-10-22 RX ORDER — LIDOCAINE HYDROCHLORIDE 10 MG/ML
INJECTION, SOLUTION EPIDURAL; INFILTRATION; INTRACAUDAL; PERINEURAL AS NEEDED
Status: DISCONTINUED | OUTPATIENT
Start: 2020-10-22 | End: 2020-10-22

## 2020-10-22 RX ORDER — ROCURONIUM BROMIDE 10 MG/ML
INJECTION, SOLUTION INTRAVENOUS AS NEEDED
Status: DISCONTINUED | OUTPATIENT
Start: 2020-10-22 | End: 2020-10-22

## 2020-10-22 RX ORDER — SODIUM CHLORIDE, SODIUM LACTATE, POTASSIUM CHLORIDE, CALCIUM CHLORIDE 600; 310; 30; 20 MG/100ML; MG/100ML; MG/100ML; MG/100ML
INJECTION, SOLUTION INTRAVENOUS CONTINUOUS PRN
Status: DISCONTINUED | OUTPATIENT
Start: 2020-10-22 | End: 2020-10-22

## 2020-10-22 RX ORDER — CALCITRIOL 0.25 UG/1
0.5 CAPSULE, LIQUID FILLED ORAL DAILY
Status: DISCONTINUED | OUTPATIENT
Start: 2020-10-22 | End: 2020-10-22

## 2020-10-22 RX ORDER — FENTANYL CITRATE 50 UG/ML
INJECTION, SOLUTION INTRAMUSCULAR; INTRAVENOUS AS NEEDED
Status: DISCONTINUED | OUTPATIENT
Start: 2020-10-22 | End: 2020-10-22

## 2020-10-22 RX ORDER — OXYCODONE HYDROCHLORIDE 5 MG/1
5 TABLET ORAL EVERY 4 HOURS PRN
Status: DISCONTINUED | OUTPATIENT
Start: 2020-10-22 | End: 2020-10-26

## 2020-10-22 RX ORDER — HYDROMORPHONE HCL/PF 1 MG/ML
0.5 SYRINGE (ML) INJECTION
Status: DISCONTINUED | OUTPATIENT
Start: 2020-10-22 | End: 2020-10-22 | Stop reason: HOSPADM

## 2020-10-22 RX ORDER — CALCIUM GLUCONATE 20 MG/ML
1 INJECTION, SOLUTION INTRAVENOUS ONCE
Status: COMPLETED | OUTPATIENT
Start: 2020-10-22 | End: 2020-10-22

## 2020-10-22 RX ORDER — HYDRALAZINE HYDROCHLORIDE 25 MG/1
75 TABLET, FILM COATED ORAL EVERY 8 HOURS SCHEDULED
Status: DISCONTINUED | OUTPATIENT
Start: 2020-10-22 | End: 2020-10-23

## 2020-10-22 RX ORDER — SODIUM CHLORIDE 9 MG/ML
50 INJECTION, SOLUTION INTRAVENOUS CONTINUOUS
Status: DISCONTINUED | OUTPATIENT
Start: 2020-10-22 | End: 2020-10-22

## 2020-10-22 RX ORDER — BUPIVACAINE HYDROCHLORIDE 2.5 MG/ML
INJECTION, SOLUTION EPIDURAL; INFILTRATION; INTRACAUDAL AS NEEDED
Status: DISCONTINUED | OUTPATIENT
Start: 2020-10-22 | End: 2020-10-22 | Stop reason: HOSPADM

## 2020-10-22 RX ORDER — FENTANYL CITRATE/PF 50 MCG/ML
50 SYRINGE (ML) INJECTION
Status: DISCONTINUED | OUTPATIENT
Start: 2020-10-22 | End: 2020-10-22 | Stop reason: HOSPADM

## 2020-10-22 RX ORDER — ONDANSETRON 2 MG/ML
4 INJECTION INTRAMUSCULAR; INTRAVENOUS ONCE AS NEEDED
Status: DISCONTINUED | OUTPATIENT
Start: 2020-10-22 | End: 2020-10-22 | Stop reason: HOSPADM

## 2020-10-22 RX ORDER — SODIUM CHLORIDE, SODIUM LACTATE, POTASSIUM CHLORIDE, CALCIUM CHLORIDE 600; 310; 30; 20 MG/100ML; MG/100ML; MG/100ML; MG/100ML
125 INJECTION, SOLUTION INTRAVENOUS CONTINUOUS
Status: DISCONTINUED | OUTPATIENT
Start: 2020-10-22 | End: 2020-10-22

## 2020-10-22 RX ORDER — ACETAMINOPHEN 325 MG/1
650 TABLET ORAL EVERY 6 HOURS PRN
Status: DISCONTINUED | OUTPATIENT
Start: 2020-10-22 | End: 2020-10-25

## 2020-10-22 RX ORDER — CARVEDILOL 25 MG/1
25 TABLET ORAL 2 TIMES DAILY WITH MEALS
Status: DISCONTINUED | OUTPATIENT
Start: 2020-10-22 | End: 2020-10-23

## 2020-10-22 RX ORDER — DEXAMETHASONE SODIUM PHOSPHATE 10 MG/ML
INJECTION, SOLUTION INTRAMUSCULAR; INTRAVENOUS AS NEEDED
Status: DISCONTINUED | OUTPATIENT
Start: 2020-10-22 | End: 2020-10-22

## 2020-10-22 RX ORDER — GLYCOPYRROLATE 0.2 MG/ML
INJECTION INTRAMUSCULAR; INTRAVENOUS AS NEEDED
Status: DISCONTINUED | OUTPATIENT
Start: 2020-10-22 | End: 2020-10-22

## 2020-10-22 RX ORDER — HYDROMORPHONE HCL/PF 1 MG/ML
SYRINGE (ML) INJECTION AS NEEDED
Status: DISCONTINUED | OUTPATIENT
Start: 2020-10-22 | End: 2020-10-22

## 2020-10-22 RX ORDER — CALCITRIOL 0.25 UG/1
0.5 CAPSULE, LIQUID FILLED ORAL DAILY
Status: DISCONTINUED | OUTPATIENT
Start: 2020-10-23 | End: 2020-10-27

## 2020-10-22 RX ORDER — ONDANSETRON 2 MG/ML
4 INJECTION INTRAMUSCULAR; INTRAVENOUS EVERY 6 HOURS PRN
Status: DISCONTINUED | OUTPATIENT
Start: 2020-10-22 | End: 2020-10-23

## 2020-10-22 RX ORDER — ONDANSETRON 2 MG/ML
INJECTION INTRAMUSCULAR; INTRAVENOUS AS NEEDED
Status: DISCONTINUED | OUTPATIENT
Start: 2020-10-22 | End: 2020-10-22

## 2020-10-22 RX ORDER — PROPOFOL 10 MG/ML
INJECTION, EMULSION INTRAVENOUS AS NEEDED
Status: DISCONTINUED | OUTPATIENT
Start: 2020-10-22 | End: 2020-10-22

## 2020-10-22 RX ORDER — LIDOCAINE HYDROCHLORIDE 10 MG/ML
0.5 INJECTION, SOLUTION EPIDURAL; INFILTRATION; INTRACAUDAL; PERINEURAL ONCE AS NEEDED
Status: COMPLETED | OUTPATIENT
Start: 2020-10-22 | End: 2020-10-22

## 2020-10-22 RX ADMIN — CARVEDILOL 25 MG: 25 TABLET, FILM COATED ORAL at 17:08

## 2020-10-22 RX ADMIN — LIDOCAINE HYDROCHLORIDE 0.2 ML: 10 INJECTION, SOLUTION EPIDURAL; INFILTRATION; INTRACAUDAL; PERINEURAL at 10:43

## 2020-10-22 RX ADMIN — NIFEDIPINE 90 MG: 60 TABLET, FILM COATED, EXTENDED RELEASE ORAL at 17:05

## 2020-10-22 RX ADMIN — CALCIUM GLUCONATE 1 G: 20 INJECTION, SOLUTION INTRAVENOUS at 21:36

## 2020-10-22 RX ADMIN — HEPARIN SODIUM 5000 UNITS: 5000 INJECTION INTRAVENOUS; SUBCUTANEOUS at 22:26

## 2020-10-22 RX ADMIN — ROCURONIUM BROMIDE 50 MG: 10 INJECTION, SOLUTION INTRAVENOUS at 11:45

## 2020-10-22 RX ADMIN — DEXMEDETOMIDINE 0.2 MCG/KG/HR: 100 INJECTION, SOLUTION, CONCENTRATE INTRAVENOUS at 11:58

## 2020-10-22 RX ADMIN — ROCURONIUM BROMIDE 10 MG: 10 INJECTION, SOLUTION INTRAVENOUS at 13:21

## 2020-10-22 RX ADMIN — CALCITRIOL 0.5 MCG: 1 INJECTION INTRAVENOUS at 21:53

## 2020-10-22 RX ADMIN — PHENYLEPHRINE HYDROCHLORIDE 100 MCG: 10 INJECTION INTRAVENOUS at 11:44

## 2020-10-22 RX ADMIN — CALCIUM CARBONATE (ANTACID) CHEW TAB 500 MG 1000 MG: 500 CHEW TAB at 21:35

## 2020-10-22 RX ADMIN — SODIUM CHLORIDE 50 ML/HR: 0.9 INJECTION, SOLUTION INTRAVENOUS at 10:42

## 2020-10-22 RX ADMIN — SODIUM CHLORIDE: 0.9 INJECTION, SOLUTION INTRAVENOUS at 11:38

## 2020-10-22 RX ADMIN — DEXAMETHASONE SODIUM PHOSPHATE 10 MG: 10 INJECTION, SOLUTION INTRAMUSCULAR; INTRAVENOUS at 12:41

## 2020-10-22 RX ADMIN — ONDANSETRON 4 MG: 2 INJECTION INTRAMUSCULAR; INTRAVENOUS at 14:59

## 2020-10-22 RX ADMIN — FENTANYL CITRATE 50 MCG: 50 INJECTION, SOLUTION INTRAMUSCULAR; INTRAVENOUS at 11:44

## 2020-10-22 RX ADMIN — ROCURONIUM BROMIDE 10 MG: 10 INJECTION, SOLUTION INTRAVENOUS at 12:52

## 2020-10-22 RX ADMIN — GLYCOPYRROLATE 0.2 MG: 0.2 INJECTION, SOLUTION INTRAMUSCULAR; INTRAVENOUS at 12:35

## 2020-10-22 RX ADMIN — SODIUM CHLORIDE 50 ML/HR: 0.9 INJECTION, SOLUTION INTRAVENOUS at 16:26

## 2020-10-22 RX ADMIN — LIDOCAINE HYDROCHLORIDE 80 MG: 10 INJECTION, SOLUTION EPIDURAL; INFILTRATION; INTRACAUDAL; PERINEURAL at 11:44

## 2020-10-22 RX ADMIN — PROPOFOL 200 MG: 10 INJECTION, EMULSION INTRAVENOUS at 11:44

## 2020-10-22 RX ADMIN — CALCIUM CARBONATE (ANTACID) CHEW TAB 500 MG 1000 MG: 500 CHEW TAB at 17:37

## 2020-10-22 RX ADMIN — HYDROMORPHONE HYDROCHLORIDE 0.5 MG: 1 INJECTION, SOLUTION INTRAMUSCULAR; INTRAVENOUS; SUBCUTANEOUS at 13:44

## 2020-10-22 RX ADMIN — FENTANYL CITRATE 50 MCG: 50 INJECTION, SOLUTION INTRAMUSCULAR; INTRAVENOUS at 12:50

## 2020-10-22 RX ADMIN — HYDRALAZINE HYDROCHLORIDE 75 MG: 25 TABLET ORAL at 22:26

## 2020-10-22 RX ADMIN — SUGAMMADEX 200 MG: 100 INJECTION, SOLUTION INTRAVENOUS at 14:40

## 2020-10-22 RX ADMIN — ROCURONIUM BROMIDE 20 MG: 10 INJECTION, SOLUTION INTRAVENOUS at 12:22

## 2020-10-22 RX ADMIN — PROPOFOL 50 MG: 10 INJECTION, EMULSION INTRAVENOUS at 11:46

## 2020-10-22 RX ADMIN — FENTANYL CITRATE 50 MCG: 50 INJECTION, SOLUTION INTRAMUSCULAR; INTRAVENOUS at 12:02

## 2020-10-23 ENCOUNTER — APPOINTMENT (OUTPATIENT)
Dept: DIALYSIS | Facility: HOSPITAL | Age: 45
DRG: 447 | End: 2020-10-23
Payer: COMMERCIAL

## 2020-10-23 PROBLEM — Z99.2 ANEMIA DUE TO CHRONIC KIDNEY DISEASE, ON CHRONIC DIALYSIS (HCC): Status: ACTIVE | Noted: 2020-10-23

## 2020-10-23 PROBLEM — N18.6 ANEMIA DUE TO CHRONIC KIDNEY DISEASE, ON CHRONIC DIALYSIS (HCC): Status: ACTIVE | Noted: 2020-10-23

## 2020-10-23 PROBLEM — D63.1 ANEMIA DUE TO CHRONIC KIDNEY DISEASE, ON CHRONIC DIALYSIS (HCC): Status: ACTIVE | Noted: 2020-10-23

## 2020-10-23 LAB
ANION GAP SERPL CALCULATED.3IONS-SCNC: 10 MMOL/L (ref 4–13)
ATRIAL RATE: 104 BPM
BASOPHILS # BLD AUTO: 0.01 THOUSANDS/ΜL (ref 0–0.1)
BASOPHILS NFR BLD AUTO: 0 % (ref 0–1)
BUN SERPL-MCNC: 103 MG/DL (ref 5–25)
CA-I BLD-SCNC: 0.7 MMOL/L (ref 1.12–1.32)
CA-I BLD-SCNC: 0.73 MMOL/L (ref 1.12–1.32)
CALCIUM SERPL-MCNC: 6.2 MG/DL (ref 8.3–10.1)
CALCIUM SERPL-MCNC: 6.6 MG/DL (ref 8.3–10.1)
CHLORIDE SERPL-SCNC: 100 MMOL/L (ref 100–108)
CO2 SERPL-SCNC: 24 MMOL/L (ref 21–32)
CREAT SERPL-MCNC: 17.3 MG/DL (ref 0.6–1.3)
EOSINOPHIL # BLD AUTO: 0 THOUSAND/ΜL (ref 0–0.61)
EOSINOPHIL NFR BLD AUTO: 0 % (ref 0–6)
ERYTHROCYTE [DISTWIDTH] IN BLOOD BY AUTOMATED COUNT: 16.5 % (ref 11.6–15.1)
GFR SERPL CREATININE-BSD FRML MDRD: 3 ML/MIN/1.73SQ M
GLUCOSE P FAST SERPL-MCNC: 146 MG/DL (ref 65–99)
GLUCOSE SERPL-MCNC: 146 MG/DL (ref 65–140)
GLUCOSE SERPL-MCNC: 188 MG/DL (ref 65–140)
HCT VFR BLD AUTO: 30.9 % (ref 36.5–49.3)
HGB BLD-MCNC: 9.8 G/DL (ref 12–17)
IMM GRANULOCYTES # BLD AUTO: 0.03 THOUSAND/UL (ref 0–0.2)
IMM GRANULOCYTES NFR BLD AUTO: 0 % (ref 0–2)
LYMPHOCYTES # BLD AUTO: 1.07 THOUSANDS/ΜL (ref 0.6–4.47)
LYMPHOCYTES NFR BLD AUTO: 13 % (ref 14–44)
MAGNESIUM SERPL-MCNC: 2 MG/DL (ref 1.6–2.6)
MCH RBC QN AUTO: 27.1 PG (ref 26.8–34.3)
MCHC RBC AUTO-ENTMCNC: 31.7 G/DL (ref 31.4–37.4)
MCV RBC AUTO: 85 FL (ref 82–98)
MONOCYTES # BLD AUTO: 0.35 THOUSAND/ΜL (ref 0.17–1.22)
MONOCYTES NFR BLD AUTO: 4 % (ref 4–12)
NEUTROPHILS # BLD AUTO: 6.96 THOUSANDS/ΜL (ref 1.85–7.62)
NEUTS SEG NFR BLD AUTO: 83 % (ref 43–75)
NRBC BLD AUTO-RTO: 0 /100 WBCS
P AXIS: 69 DEGREES
PLATELET # BLD AUTO: 209 THOUSANDS/UL (ref 149–390)
PMV BLD AUTO: 10.8 FL (ref 8.9–12.7)
POTASSIUM SERPL-SCNC: 6.8 MMOL/L (ref 3.5–5.3)
POTASSIUM SERPL-SCNC: 7.3 MMOL/L (ref 3.5–5.3)
PR INTERVAL: 188 MS
PTH-INTACT SERPL-MCNC: 544.4 PG/ML (ref 18.4–80.1)
QRS AXIS: 41 DEGREES
QRSD INTERVAL: 100 MS
QT INTERVAL: 358 MS
QTC INTERVAL: 471 MS
RBC # BLD AUTO: 3.62 MILLION/UL (ref 3.88–5.62)
SODIUM SERPL-SCNC: 134 MMOL/L (ref 136–145)
T WAVE AXIS: 65 DEGREES
VENTRICULAR RATE: 104 BPM
WBC # BLD AUTO: 8.42 THOUSAND/UL (ref 4.31–10.16)

## 2020-10-23 PROCEDURE — 85025 COMPLETE CBC W/AUTO DIFF WBC: CPT | Performed by: SURGERY

## 2020-10-23 PROCEDURE — 5A1D70Z PERFORMANCE OF URINARY FILTRATION, INTERMITTENT, LESS THAN 6 HOURS PER DAY: ICD-10-PCS | Performed by: INTERNAL MEDICINE

## 2020-10-23 PROCEDURE — 82310 ASSAY OF CALCIUM: CPT | Performed by: SURGERY

## 2020-10-23 PROCEDURE — G0257 UNSCHED DIALYSIS ESRD PT HOS: HCPCS

## 2020-10-23 PROCEDURE — 80048 BASIC METABOLIC PNL TOTAL CA: CPT | Performed by: SURGERY

## 2020-10-23 PROCEDURE — 93005 ELECTROCARDIOGRAM TRACING: CPT

## 2020-10-23 PROCEDURE — 82330 ASSAY OF CALCIUM: CPT | Performed by: SURGERY

## 2020-10-23 PROCEDURE — 83970 ASSAY OF PARATHORMONE: CPT | Performed by: SURGERY

## 2020-10-23 PROCEDURE — 90935 HEMODIALYSIS ONE EVALUATION: CPT | Performed by: INTERNAL MEDICINE

## 2020-10-23 PROCEDURE — 82948 REAGENT STRIP/BLOOD GLUCOSE: CPT

## 2020-10-23 PROCEDURE — 83735 ASSAY OF MAGNESIUM: CPT | Performed by: SURGERY

## 2020-10-23 PROCEDURE — 84132 ASSAY OF SERUM POTASSIUM: CPT | Performed by: SURGERY

## 2020-10-23 PROCEDURE — 99024 POSTOP FOLLOW-UP VISIT: CPT | Performed by: SURGERY

## 2020-10-23 PROCEDURE — 94760 N-INVAS EAR/PLS OXIMETRY 1: CPT

## 2020-10-23 PROCEDURE — 94640 AIRWAY INHALATION TREATMENT: CPT

## 2020-10-23 PROCEDURE — 93010 ELECTROCARDIOGRAM REPORT: CPT | Performed by: INTERNAL MEDICINE

## 2020-10-23 RX ORDER — CALCIUM GLUCONATE 20 MG/ML
2 INJECTION, SOLUTION INTRAVENOUS ONCE
Status: COMPLETED | OUTPATIENT
Start: 2020-10-23 | End: 2020-10-24

## 2020-10-23 RX ORDER — CALCIUM GLUCONATE 20 MG/ML
2 INJECTION, SOLUTION INTRAVENOUS ONCE
Status: COMPLETED | OUTPATIENT
Start: 2020-10-23 | End: 2020-10-23

## 2020-10-23 RX ORDER — CARVEDILOL 25 MG/1
25 TABLET ORAL 2 TIMES DAILY WITH MEALS
Status: DISCONTINUED | OUTPATIENT
Start: 2020-10-23 | End: 2020-10-29 | Stop reason: HOSPADM

## 2020-10-23 RX ORDER — HYDRALAZINE HYDROCHLORIDE 25 MG/1
75 TABLET, FILM COATED ORAL EVERY 8 HOURS SCHEDULED
Status: DISCONTINUED | OUTPATIENT
Start: 2020-10-23 | End: 2020-10-29 | Stop reason: HOSPADM

## 2020-10-23 RX ORDER — DEXTROSE MONOHYDRATE 25 G/50ML
25 INJECTION, SOLUTION INTRAVENOUS ONCE
Status: COMPLETED | OUTPATIENT
Start: 2020-10-23 | End: 2020-10-23

## 2020-10-23 RX ORDER — ALBUTEROL SULFATE 2.5 MG/3ML
5 SOLUTION RESPIRATORY (INHALATION) ONCE
Status: COMPLETED | OUTPATIENT
Start: 2020-10-23 | End: 2020-10-23

## 2020-10-23 RX ORDER — SODIUM POLYSTYRENE SULFONATE 4.1 MEQ/G
30 POWDER, FOR SUSPENSION ORAL; RECTAL ONCE
Status: COMPLETED | OUTPATIENT
Start: 2020-10-23 | End: 2020-10-23

## 2020-10-23 RX ADMIN — SODIUM POLYSTYRENE SULFONATE 30 G: 4.1 POWDER, FOR SUSPENSION ORAL; RECTAL at 05:27

## 2020-10-23 RX ADMIN — INSULIN HUMAN 10 UNITS: 100 INJECTION, SOLUTION PARENTERAL at 05:29

## 2020-10-23 RX ADMIN — HEPARIN SODIUM 5000 UNITS: 5000 INJECTION INTRAVENOUS; SUBCUTANEOUS at 05:29

## 2020-10-23 RX ADMIN — HYDRALAZINE HYDROCHLORIDE 75 MG: 25 TABLET ORAL at 13:06

## 2020-10-23 RX ADMIN — EPOETIN ALFA 10000 UNITS: 10000 SOLUTION INTRAVENOUS; SUBCUTANEOUS at 11:23

## 2020-10-23 RX ADMIN — OXYCODONE HYDROCHLORIDE 5 MG: 5 TABLET ORAL at 19:54

## 2020-10-23 RX ADMIN — ALBUTEROL SULFATE 5 MG: 2.5 SOLUTION RESPIRATORY (INHALATION) at 05:23

## 2020-10-23 RX ADMIN — CALCIUM GLUCONATE 2 G: 20 INJECTION, SOLUTION INTRAVENOUS at 23:47

## 2020-10-23 RX ADMIN — DEXTROSE MONOHYDRATE 25 ML: 25 INJECTION, SOLUTION INTRAVENOUS at 05:29

## 2020-10-23 RX ADMIN — CALCIUM CARBONATE (ANTACID) CHEW TAB 500 MG 1000 MG: 500 CHEW TAB at 21:40

## 2020-10-23 RX ADMIN — HYDRALAZINE HYDROCHLORIDE 75 MG: 25 TABLET ORAL at 05:29

## 2020-10-23 RX ADMIN — CALCIUM GLUCONATE 2 G: 20 INJECTION, SOLUTION INTRAVENOUS at 04:50

## 2020-10-23 RX ADMIN — CALCIUM CARBONATE (ANTACID) CHEW TAB 500 MG 1000 MG: 500 CHEW TAB at 18:59

## 2020-10-23 RX ADMIN — NIFEDIPINE 90 MG: 60 TABLET, FILM COATED, EXTENDED RELEASE ORAL at 13:06

## 2020-10-23 RX ADMIN — HEPARIN SODIUM 5000 UNITS: 5000 INJECTION INTRAVENOUS; SUBCUTANEOUS at 14:03

## 2020-10-23 RX ADMIN — CARVEDILOL 25 MG: 25 TABLET, FILM COATED ORAL at 18:59

## 2020-10-23 RX ADMIN — CARVEDILOL 25 MG: 25 TABLET, FILM COATED ORAL at 13:06

## 2020-10-23 RX ADMIN — HEPARIN SODIUM 5000 UNITS: 5000 INJECTION INTRAVENOUS; SUBCUTANEOUS at 21:40

## 2020-10-24 ENCOUNTER — APPOINTMENT (INPATIENT)
Dept: DIALYSIS | Facility: HOSPITAL | Age: 45
DRG: 447 | End: 2020-10-24
Payer: COMMERCIAL

## 2020-10-24 LAB
ANION GAP SERPL CALCULATED.3IONS-SCNC: 9 MMOL/L (ref 4–13)
BASOPHILS # BLD AUTO: 0.05 THOUSANDS/ΜL (ref 0–0.1)
BASOPHILS NFR BLD AUTO: 1 % (ref 0–1)
BUN SERPL-MCNC: 55 MG/DL (ref 5–25)
CA-I BLD-SCNC: 0.69 MMOL/L (ref 1.12–1.32)
CA-I BLD-SCNC: 0.78 MMOL/L (ref 1.12–1.32)
CALCIUM SERPL-MCNC: 6.4 MG/DL (ref 8.3–10.1)
CHLORIDE SERPL-SCNC: 98 MMOL/L (ref 100–108)
CO2 SERPL-SCNC: 28 MMOL/L (ref 21–32)
CREAT SERPL-MCNC: 11.7 MG/DL (ref 0.6–1.3)
EOSINOPHIL # BLD AUTO: 0.15 THOUSAND/ΜL (ref 0–0.61)
EOSINOPHIL NFR BLD AUTO: 2 % (ref 0–6)
ERYTHROCYTE [DISTWIDTH] IN BLOOD BY AUTOMATED COUNT: 16.6 % (ref 11.6–15.1)
GFR SERPL CREATININE-BSD FRML MDRD: 5 ML/MIN/1.73SQ M
GLUCOSE SERPL-MCNC: 124 MG/DL (ref 65–140)
HCT VFR BLD AUTO: 30.6 % (ref 36.5–49.3)
HGB BLD-MCNC: 9.4 G/DL (ref 12–17)
IMM GRANULOCYTES # BLD AUTO: 0.03 THOUSAND/UL (ref 0–0.2)
IMM GRANULOCYTES NFR BLD AUTO: 0 % (ref 0–2)
LYMPHOCYTES # BLD AUTO: 1.73 THOUSANDS/ΜL (ref 0.6–4.47)
LYMPHOCYTES NFR BLD AUTO: 23 % (ref 14–44)
MCH RBC QN AUTO: 26.7 PG (ref 26.8–34.3)
MCHC RBC AUTO-ENTMCNC: 30.7 G/DL (ref 31.4–37.4)
MCV RBC AUTO: 87 FL (ref 82–98)
MONOCYTES # BLD AUTO: 0.56 THOUSAND/ΜL (ref 0.17–1.22)
MONOCYTES NFR BLD AUTO: 7 % (ref 4–12)
NEUTROPHILS # BLD AUTO: 5.16 THOUSANDS/ΜL (ref 1.85–7.62)
NEUTS SEG NFR BLD AUTO: 67 % (ref 43–75)
NRBC BLD AUTO-RTO: 0 /100 WBCS
PLATELET # BLD AUTO: 197 THOUSANDS/UL (ref 149–390)
PMV BLD AUTO: 10.5 FL (ref 8.9–12.7)
POTASSIUM SERPL-SCNC: 4.2 MMOL/L (ref 3.5–5.3)
PTH-INTACT SERPL-MCNC: 544.3 PG/ML (ref 18.4–80.1)
RBC # BLD AUTO: 3.52 MILLION/UL (ref 3.88–5.62)
SODIUM SERPL-SCNC: 135 MMOL/L (ref 136–145)
WBC # BLD AUTO: 7.68 THOUSAND/UL (ref 4.31–10.16)

## 2020-10-24 PROCEDURE — 90935 HEMODIALYSIS ONE EVALUATION: CPT | Performed by: INTERNAL MEDICINE

## 2020-10-24 PROCEDURE — 99024 POSTOP FOLLOW-UP VISIT: CPT | Performed by: SURGERY

## 2020-10-24 PROCEDURE — 94760 N-INVAS EAR/PLS OXIMETRY 1: CPT

## 2020-10-24 PROCEDURE — 87081 CULTURE SCREEN ONLY: CPT | Performed by: SURGERY

## 2020-10-24 PROCEDURE — 80048 BASIC METABOLIC PNL TOTAL CA: CPT | Performed by: SURGERY

## 2020-10-24 PROCEDURE — 82330 ASSAY OF CALCIUM: CPT | Performed by: SURGERY

## 2020-10-24 PROCEDURE — 85025 COMPLETE CBC W/AUTO DIFF WBC: CPT | Performed by: SURGERY

## 2020-10-24 RX ADMIN — HEPARIN SODIUM 5000 UNITS: 5000 INJECTION INTRAVENOUS; SUBCUTANEOUS at 14:19

## 2020-10-24 RX ADMIN — CALCIUM CARBONATE (ANTACID) CHEW TAB 500 MG 1000 MG: 500 CHEW TAB at 18:15

## 2020-10-24 RX ADMIN — CALCIUM CARBONATE (ANTACID) CHEW TAB 500 MG 1000 MG: 500 CHEW TAB at 21:27

## 2020-10-24 RX ADMIN — CALCIUM CARBONATE (ANTACID) CHEW TAB 500 MG 1000 MG: 500 CHEW TAB at 05:12

## 2020-10-24 RX ADMIN — ACETAMINOPHEN 650 MG: 325 TABLET, FILM COATED ORAL at 18:22

## 2020-10-24 RX ADMIN — CALCITRIOL 0.5 MCG: 0.25 CAPSULE, LIQUID FILLED ORAL at 12:51

## 2020-10-24 RX ADMIN — CALCIUM GLUCONATE 3 G: 98 INJECTION, SOLUTION INTRAVENOUS at 14:54

## 2020-10-24 RX ADMIN — HYDRALAZINE HYDROCHLORIDE 75 MG: 25 TABLET, FILM COATED ORAL at 14:18

## 2020-10-24 RX ADMIN — HEPARIN SODIUM 5000 UNITS: 5000 INJECTION INTRAVENOUS; SUBCUTANEOUS at 21:28

## 2020-10-24 RX ADMIN — NIFEDIPINE 90 MG: 60 TABLET, FILM COATED, EXTENDED RELEASE ORAL at 12:51

## 2020-10-24 RX ADMIN — OXYCODONE HYDROCHLORIDE 5 MG: 5 TABLET ORAL at 21:37

## 2020-10-24 RX ADMIN — CARVEDILOL 25 MG: 25 TABLET, FILM COATED ORAL at 18:15

## 2020-10-24 RX ADMIN — OXYCODONE HYDROCHLORIDE 5 MG: 5 TABLET ORAL at 05:12

## 2020-10-24 RX ADMIN — HYDRALAZINE HYDROCHLORIDE 75 MG: 25 TABLET, FILM COATED ORAL at 05:12

## 2020-10-24 RX ADMIN — HYDRALAZINE HYDROCHLORIDE 75 MG: 25 TABLET, FILM COATED ORAL at 21:26

## 2020-10-24 RX ADMIN — OXYCODONE HYDROCHLORIDE 5 MG: 5 TABLET ORAL at 14:19

## 2020-10-25 ENCOUNTER — APPOINTMENT (INPATIENT)
Dept: RADIOLOGY | Facility: HOSPITAL | Age: 45
DRG: 447 | End: 2020-10-25
Payer: COMMERCIAL

## 2020-10-25 LAB
ANION GAP SERPL CALCULATED.3IONS-SCNC: 10 MMOL/L (ref 4–13)
BASOPHILS # BLD AUTO: 0.05 THOUSANDS/ΜL (ref 0–0.1)
BASOPHILS NFR BLD AUTO: 1 % (ref 0–1)
BUN SERPL-MCNC: 44 MG/DL (ref 5–25)
CA-I BLD-SCNC: 0.69 MMOL/L (ref 1.12–1.32)
CA-I BLD-SCNC: 0.71 MMOL/L (ref 1.12–1.32)
CA-I BLD-SCNC: 0.76 MMOL/L (ref 1.12–1.32)
CALCIUM SERPL-MCNC: 6.5 MG/DL (ref 8.3–10.1)
CHLORIDE SERPL-SCNC: 97 MMOL/L (ref 100–108)
CO2 SERPL-SCNC: 27 MMOL/L (ref 21–32)
CREAT SERPL-MCNC: 10.3 MG/DL (ref 0.6–1.3)
EOSINOPHIL # BLD AUTO: 0.23 THOUSAND/ΜL (ref 0–0.61)
EOSINOPHIL NFR BLD AUTO: 3 % (ref 0–6)
ERYTHROCYTE [DISTWIDTH] IN BLOOD BY AUTOMATED COUNT: 16.4 % (ref 11.6–15.1)
GFR SERPL CREATININE-BSD FRML MDRD: 6 ML/MIN/1.73SQ M
GLUCOSE SERPL-MCNC: 118 MG/DL (ref 65–140)
HCT VFR BLD AUTO: 31.2 % (ref 36.5–49.3)
HGB BLD-MCNC: 9.9 G/DL (ref 12–17)
IMM GRANULOCYTES # BLD AUTO: 0.07 THOUSAND/UL (ref 0–0.2)
IMM GRANULOCYTES NFR BLD AUTO: 1 % (ref 0–2)
LYMPHOCYTES # BLD AUTO: 1.89 THOUSANDS/ΜL (ref 0.6–4.47)
LYMPHOCYTES NFR BLD AUTO: 25 % (ref 14–44)
MCH RBC QN AUTO: 27.5 PG (ref 26.8–34.3)
MCHC RBC AUTO-ENTMCNC: 31.7 G/DL (ref 31.4–37.4)
MCV RBC AUTO: 87 FL (ref 82–98)
MONOCYTES # BLD AUTO: 0.64 THOUSAND/ΜL (ref 0.17–1.22)
MONOCYTES NFR BLD AUTO: 9 % (ref 4–12)
NEUTROPHILS # BLD AUTO: 4.62 THOUSANDS/ΜL (ref 1.85–7.62)
NEUTS SEG NFR BLD AUTO: 61 % (ref 43–75)
NRBC BLD AUTO-RTO: 0 /100 WBCS
PLATELET # BLD AUTO: 216 THOUSANDS/UL (ref 149–390)
PMV BLD AUTO: 11.1 FL (ref 8.9–12.7)
POTASSIUM SERPL-SCNC: 4.2 MMOL/L (ref 3.5–5.3)
PTH-INTACT SERPL-MCNC: 692.4 PG/ML (ref 18.4–80.1)
RBC # BLD AUTO: 3.6 MILLION/UL (ref 3.88–5.62)
SODIUM SERPL-SCNC: 134 MMOL/L (ref 136–145)
WBC # BLD AUTO: 7.5 THOUSAND/UL (ref 4.31–10.16)

## 2020-10-25 PROCEDURE — 82330 ASSAY OF CALCIUM: CPT | Performed by: SURGERY

## 2020-10-25 PROCEDURE — 71045 X-RAY EXAM CHEST 1 VIEW: CPT

## 2020-10-25 PROCEDURE — 85025 COMPLETE CBC W/AUTO DIFF WBC: CPT | Performed by: SURGERY

## 2020-10-25 PROCEDURE — 80048 BASIC METABOLIC PNL TOTAL CA: CPT | Performed by: INTERNAL MEDICINE

## 2020-10-25 PROCEDURE — 02HV33Z INSERTION OF INFUSION DEVICE INTO SUPERIOR VENA CAVA, PERCUTANEOUS APPROACH: ICD-10-PCS | Performed by: SURGERY

## 2020-10-25 PROCEDURE — 99232 SBSQ HOSP IP/OBS MODERATE 35: CPT | Performed by: INTERNAL MEDICINE

## 2020-10-25 PROCEDURE — NC001 PR NO CHARGE: Performed by: SURGERY

## 2020-10-25 PROCEDURE — 83970 ASSAY OF PARATHORMONE: CPT | Performed by: SURGERY

## 2020-10-25 PROCEDURE — 99024 POSTOP FOLLOW-UP VISIT: CPT | Performed by: SURGERY

## 2020-10-25 PROCEDURE — 94760 N-INVAS EAR/PLS OXIMETRY 1: CPT

## 2020-10-25 RX ORDER — CALCIUM GLUCONATE 20 MG/ML
2 INJECTION, SOLUTION INTRAVENOUS ONCE
Status: DISCONTINUED | OUTPATIENT
Start: 2020-10-25 | End: 2020-10-25

## 2020-10-25 RX ORDER — CALCIUM GLUCONATE 20 MG/ML
2 INJECTION, SOLUTION INTRAVENOUS ONCE
Status: COMPLETED | OUTPATIENT
Start: 2020-10-25 | End: 2020-10-25

## 2020-10-25 RX ORDER — CALCIUM GLUCONATE 20 MG/ML
1 INJECTION, SOLUTION INTRAVENOUS ONCE
Status: COMPLETED | OUTPATIENT
Start: 2020-10-25 | End: 2020-10-25

## 2020-10-25 RX ORDER — CALCITRIOL 1 UG/ML
2 INJECTION INTRAVENOUS ONCE
Status: COMPLETED | OUTPATIENT
Start: 2020-10-25 | End: 2020-10-25

## 2020-10-25 RX ORDER — CALCIUM CARBONATE 200(500)MG
2000 TABLET,CHEWABLE ORAL 3 TIMES DAILY
Status: DISCONTINUED | OUTPATIENT
Start: 2020-10-25 | End: 2020-10-25

## 2020-10-25 RX ORDER — ACETAMINOPHEN 325 MG/1
650 TABLET ORAL EVERY 6 HOURS PRN
Status: DISCONTINUED | OUTPATIENT
Start: 2020-10-25 | End: 2020-10-29 | Stop reason: HOSPADM

## 2020-10-25 RX ORDER — CALCIUM CARBONATE 200(500)MG
2000 TABLET,CHEWABLE ORAL 3 TIMES DAILY
Status: DISCONTINUED | OUTPATIENT
Start: 2020-10-25 | End: 2020-10-27

## 2020-10-25 RX ADMIN — OXYCODONE HYDROCHLORIDE 5 MG: 5 TABLET ORAL at 22:04

## 2020-10-25 RX ADMIN — HEPARIN SODIUM 5000 UNITS: 5000 INJECTION INTRAVENOUS; SUBCUTANEOUS at 13:00

## 2020-10-25 RX ADMIN — CALCIUM CARBONATE (ANTACID) CHEW TAB 500 MG 1000 MG: 500 CHEW TAB at 08:41

## 2020-10-25 RX ADMIN — HYDRALAZINE HYDROCHLORIDE 75 MG: 25 TABLET, FILM COATED ORAL at 22:04

## 2020-10-25 RX ADMIN — CARVEDILOL 25 MG: 25 TABLET, FILM COATED ORAL at 15:31

## 2020-10-25 RX ADMIN — CALCIUM CARBONATE (ANTACID) CHEW TAB 500 MG 2000 MG: 500 CHEW TAB at 22:05

## 2020-10-25 RX ADMIN — HYDRALAZINE HYDROCHLORIDE 75 MG: 25 TABLET, FILM COATED ORAL at 13:00

## 2020-10-25 RX ADMIN — CALCIUM CHLORIDE 2 G: 100 INJECTION, SOLUTION INTRAVENOUS at 09:36

## 2020-10-25 RX ADMIN — ACETAMINOPHEN 650 MG: 325 TABLET, FILM COATED ORAL at 17:22

## 2020-10-25 RX ADMIN — CALCIUM CARBONATE (ANTACID) CHEW TAB 500 MG 2000 MG: 500 CHEW TAB at 15:17

## 2020-10-25 RX ADMIN — ACETAMINOPHEN 650 MG: 325 TABLET, FILM COATED ORAL at 10:57

## 2020-10-25 RX ADMIN — CALCITRIOL 0.5 MCG: 0.25 CAPSULE, LIQUID FILLED ORAL at 08:42

## 2020-10-25 RX ADMIN — HEPARIN SODIUM 5000 UNITS: 5000 INJECTION INTRAVENOUS; SUBCUTANEOUS at 06:00

## 2020-10-25 RX ADMIN — CALCIUM CHLORIDE 1 G: 100 INJECTION, SOLUTION INTRAVENOUS at 16:03

## 2020-10-25 RX ADMIN — CALCIUM GLUCONATE 2 G: 20 INJECTION, SOLUTION INTRAVENOUS at 02:19

## 2020-10-25 RX ADMIN — HEPARIN SODIUM 5000 UNITS: 5000 INJECTION INTRAVENOUS; SUBCUTANEOUS at 22:05

## 2020-10-25 RX ADMIN — HYDRALAZINE HYDROCHLORIDE 75 MG: 25 TABLET, FILM COATED ORAL at 05:59

## 2020-10-25 RX ADMIN — HYALURONIDASE (HUMAN RECOMBINANT) 15 UNITS: 150 INJECTION, SOLUTION SUBCUTANEOUS at 12:59

## 2020-10-25 RX ADMIN — NIFEDIPINE 90 MG: 60 TABLET, FILM COATED, EXTENDED RELEASE ORAL at 08:42

## 2020-10-25 RX ADMIN — ACETAMINOPHEN 650 MG: 325 TABLET, FILM COATED ORAL at 04:26

## 2020-10-25 RX ADMIN — CARVEDILOL 25 MG: 25 TABLET, FILM COATED ORAL at 08:41

## 2020-10-25 RX ADMIN — CALCITRIOL 2 MCG: 1 INJECTION INTRAVENOUS at 04:18

## 2020-10-25 RX ADMIN — CALCIUM GLUCONATE 1 G: 20 INJECTION, SOLUTION INTRAVENOUS at 03:24

## 2020-10-25 RX ADMIN — OXYCODONE HYDROCHLORIDE 5 MG: 5 TABLET ORAL at 15:17

## 2020-10-26 ENCOUNTER — APPOINTMENT (INPATIENT)
Dept: DIALYSIS | Facility: HOSPITAL | Age: 45
DRG: 447 | End: 2020-10-26
Payer: COMMERCIAL

## 2020-10-26 LAB
ALBUMIN SERPL BCP-MCNC: 3.3 G/DL (ref 3.5–5)
ALP SERPL-CCNC: 488 U/L (ref 46–116)
ALT SERPL W P-5'-P-CCNC: 11 U/L (ref 12–78)
ANION GAP SERPL CALCULATED.3IONS-SCNC: 9 MMOL/L (ref 4–13)
AST SERPL W P-5'-P-CCNC: 7 U/L (ref 5–45)
BASOPHILS # BLD AUTO: 0.03 THOUSANDS/ΜL (ref 0–0.1)
BASOPHILS NFR BLD AUTO: 0 % (ref 0–1)
BILIRUB SERPL-MCNC: 0.3 MG/DL (ref 0.2–1)
BUN SERPL-MCNC: 62 MG/DL (ref 5–25)
CA-I BLD-SCNC: 0.74 MMOL/L (ref 1.12–1.32)
CA-I BLD-SCNC: 0.76 MMOL/L (ref 1.12–1.32)
CA-I BLD-SCNC: 0.82 MMOL/L (ref 1.12–1.32)
CALCIUM ALBUM COR SERPL-MCNC: 7.6 MG/DL (ref 8.3–10.1)
CALCIUM SERPL-MCNC: 7 MG/DL (ref 8.3–10.1)
CHLORIDE SERPL-SCNC: 99 MMOL/L (ref 100–108)
CO2 SERPL-SCNC: 26 MMOL/L (ref 21–32)
CREAT SERPL-MCNC: 13.4 MG/DL (ref 0.6–1.3)
EOSINOPHIL # BLD AUTO: 0.2 THOUSAND/ΜL (ref 0–0.61)
EOSINOPHIL NFR BLD AUTO: 2 % (ref 0–6)
ERYTHROCYTE [DISTWIDTH] IN BLOOD BY AUTOMATED COUNT: 16.3 % (ref 11.6–15.1)
GFR SERPL CREATININE-BSD FRML MDRD: 5 ML/MIN/1.73SQ M
GLUCOSE SERPL-MCNC: 117 MG/DL (ref 65–140)
HCT VFR BLD AUTO: 27.8 % (ref 36.5–49.3)
HGB BLD-MCNC: 8.8 G/DL (ref 12–17)
IMM GRANULOCYTES # BLD AUTO: 0.07 THOUSAND/UL (ref 0–0.2)
IMM GRANULOCYTES NFR BLD AUTO: 1 % (ref 0–2)
LYMPHOCYTES # BLD AUTO: 2.14 THOUSANDS/ΜL (ref 0.6–4.47)
LYMPHOCYTES NFR BLD AUTO: 24 % (ref 14–44)
MCH RBC QN AUTO: 27.3 PG (ref 26.8–34.3)
MCHC RBC AUTO-ENTMCNC: 31.7 G/DL (ref 31.4–37.4)
MCV RBC AUTO: 86 FL (ref 82–98)
MONOCYTES # BLD AUTO: 0.81 THOUSAND/ΜL (ref 0.17–1.22)
MONOCYTES NFR BLD AUTO: 9 % (ref 4–12)
MRSA NOSE QL CULT: NORMAL
NEUTROPHILS # BLD AUTO: 5.55 THOUSANDS/ΜL (ref 1.85–7.62)
NEUTS SEG NFR BLD AUTO: 64 % (ref 43–75)
NRBC BLD AUTO-RTO: 0 /100 WBCS
PLATELET # BLD AUTO: 213 THOUSANDS/UL (ref 149–390)
PMV BLD AUTO: 11.4 FL (ref 8.9–12.7)
POTASSIUM SERPL-SCNC: 4.4 MMOL/L (ref 3.5–5.3)
PROT SERPL-MCNC: 7.9 G/DL (ref 6.4–8.2)
PTH-INTACT SERPL-MCNC: 896.9 PG/ML (ref 18.4–80.1)
RBC # BLD AUTO: 3.22 MILLION/UL (ref 3.88–5.62)
SODIUM SERPL-SCNC: 134 MMOL/L (ref 136–145)
WBC # BLD AUTO: 8.8 THOUSAND/UL (ref 4.31–10.16)

## 2020-10-26 PROCEDURE — 99024 POSTOP FOLLOW-UP VISIT: CPT | Performed by: SURGERY

## 2020-10-26 PROCEDURE — 80053 COMPREHEN METABOLIC PANEL: CPT | Performed by: SURGERY

## 2020-10-26 PROCEDURE — 85025 COMPLETE CBC W/AUTO DIFF WBC: CPT | Performed by: SURGERY

## 2020-10-26 PROCEDURE — 83970 ASSAY OF PARATHORMONE: CPT | Performed by: SURGERY

## 2020-10-26 PROCEDURE — 90935 HEMODIALYSIS ONE EVALUATION: CPT | Performed by: INTERNAL MEDICINE

## 2020-10-26 PROCEDURE — 82330 ASSAY OF CALCIUM: CPT | Performed by: SURGERY

## 2020-10-26 PROCEDURE — 5A1D70Z PERFORMANCE OF URINARY FILTRATION, INTERMITTENT, LESS THAN 6 HOURS PER DAY: ICD-10-PCS | Performed by: INTERNAL MEDICINE

## 2020-10-26 RX ORDER — OXYCODONE HYDROCHLORIDE 5 MG/1
5 TABLET ORAL EVERY 4 HOURS PRN
Status: DISCONTINUED | OUTPATIENT
Start: 2020-10-26 | End: 2020-10-29 | Stop reason: HOSPADM

## 2020-10-26 RX ORDER — CHOLECALCIFEROL (VITAMIN D3) 10 MCG
1 TABLET ORAL
Status: DISCONTINUED | OUTPATIENT
Start: 2020-10-26 | End: 2020-10-29 | Stop reason: HOSPADM

## 2020-10-26 RX ORDER — CALCIUM GLUCONATE 20 MG/ML
1 INJECTION, SOLUTION INTRAVENOUS ONCE
Status: COMPLETED | OUTPATIENT
Start: 2020-10-26 | End: 2020-10-27

## 2020-10-26 RX ORDER — OXYCODONE HYDROCHLORIDE 10 MG/1
10 TABLET ORAL EVERY 4 HOURS PRN
Status: DISCONTINUED | OUTPATIENT
Start: 2020-10-26 | End: 2020-10-29 | Stop reason: HOSPADM

## 2020-10-26 RX ADMIN — CALCIUM CHLORIDE 1 G: 100 INJECTION, SOLUTION INTRAVENOUS at 03:52

## 2020-10-26 RX ADMIN — NIFEDIPINE 90 MG: 60 TABLET, FILM COATED, EXTENDED RELEASE ORAL at 07:13

## 2020-10-26 RX ADMIN — OXYCODONE HYDROCHLORIDE 10 MG: 10 TABLET ORAL at 05:04

## 2020-10-26 RX ADMIN — OXYCODONE HYDROCHLORIDE 10 MG: 10 TABLET ORAL at 16:03

## 2020-10-26 RX ADMIN — CARVEDILOL 25 MG: 25 TABLET, FILM COATED ORAL at 07:12

## 2020-10-26 RX ADMIN — Medication 1 CAPSULE: at 16:05

## 2020-10-26 RX ADMIN — HYDRALAZINE HYDROCHLORIDE 75 MG: 25 TABLET, FILM COATED ORAL at 13:17

## 2020-10-26 RX ADMIN — EPOETIN ALFA 10000 UNITS: 10000 SOLUTION INTRAVENOUS; SUBCUTANEOUS at 10:33

## 2020-10-26 RX ADMIN — CALCIUM GLUCONATE 1 G: 20 INJECTION, SOLUTION INTRAVENOUS at 18:12

## 2020-10-26 RX ADMIN — HEPARIN SODIUM 5000 UNITS: 5000 INJECTION INTRAVENOUS; SUBCUTANEOUS at 21:25

## 2020-10-26 RX ADMIN — HEPARIN SODIUM 5000 UNITS: 5000 INJECTION INTRAVENOUS; SUBCUTANEOUS at 13:20

## 2020-10-26 RX ADMIN — CARVEDILOL 25 MG: 25 TABLET, FILM COATED ORAL at 16:03

## 2020-10-26 RX ADMIN — OXYCODONE HYDROCHLORIDE 10 MG: 10 TABLET ORAL at 13:17

## 2020-10-26 RX ADMIN — CALCIUM CARBONATE (ANTACID) CHEW TAB 500 MG 2000 MG: 500 CHEW TAB at 07:12

## 2020-10-26 RX ADMIN — CALCIUM CARBONATE (ANTACID) CHEW TAB 500 MG 2000 MG: 500 CHEW TAB at 21:20

## 2020-10-26 RX ADMIN — CALCITRIOL 0.5 MCG: 0.25 CAPSULE, LIQUID FILLED ORAL at 07:12

## 2020-10-26 RX ADMIN — HYDRALAZINE HYDROCHLORIDE 75 MG: 25 TABLET, FILM COATED ORAL at 21:21

## 2020-10-26 RX ADMIN — HYDRALAZINE HYDROCHLORIDE 75 MG: 25 TABLET, FILM COATED ORAL at 05:04

## 2020-10-26 RX ADMIN — CALCIUM CARBONATE (ANTACID) CHEW TAB 500 MG 2000 MG: 500 CHEW TAB at 16:03

## 2020-10-26 RX ADMIN — HEPARIN SODIUM 5000 UNITS: 5000 INJECTION INTRAVENOUS; SUBCUTANEOUS at 05:04

## 2020-10-27 LAB
ANION GAP SERPL CALCULATED.3IONS-SCNC: 9 MMOL/L (ref 4–13)
BASOPHILS # BLD AUTO: 0.03 THOUSANDS/ΜL (ref 0–0.1)
BASOPHILS NFR BLD AUTO: 0 % (ref 0–1)
BUN SERPL-MCNC: 56 MG/DL (ref 5–25)
CA-I BLD-SCNC: 0.77 MMOL/L (ref 1.12–1.32)
CA-I BLD-SCNC: 0.79 MMOL/L (ref 1.12–1.32)
CA-I BLD-SCNC: 0.8 MMOL/L (ref 1.12–1.32)
CALCIUM SERPL-MCNC: 6.9 MG/DL (ref 8.3–10.1)
CHLORIDE SERPL-SCNC: 97 MMOL/L (ref 100–108)
CO2 SERPL-SCNC: 27 MMOL/L (ref 21–32)
CREAT SERPL-MCNC: 12.1 MG/DL (ref 0.6–1.3)
EOSINOPHIL # BLD AUTO: 0.27 THOUSAND/ΜL (ref 0–0.61)
EOSINOPHIL NFR BLD AUTO: 3 % (ref 0–6)
ERYTHROCYTE [DISTWIDTH] IN BLOOD BY AUTOMATED COUNT: 16.5 % (ref 11.6–15.1)
GFR SERPL CREATININE-BSD FRML MDRD: 5 ML/MIN/1.73SQ M
GLUCOSE SERPL-MCNC: 101 MG/DL (ref 65–140)
HCT VFR BLD AUTO: 27.9 % (ref 36.5–49.3)
HGB BLD-MCNC: 8.5 G/DL (ref 12–17)
IMM GRANULOCYTES # BLD AUTO: 0.04 THOUSAND/UL (ref 0–0.2)
IMM GRANULOCYTES NFR BLD AUTO: 1 % (ref 0–2)
LYMPHOCYTES # BLD AUTO: 2.26 THOUSANDS/ΜL (ref 0.6–4.47)
LYMPHOCYTES NFR BLD AUTO: 29 % (ref 14–44)
MAGNESIUM SERPL-MCNC: 2 MG/DL (ref 1.6–2.6)
MCH RBC QN AUTO: 27 PG (ref 26.8–34.3)
MCHC RBC AUTO-ENTMCNC: 30.5 G/DL (ref 31.4–37.4)
MCV RBC AUTO: 89 FL (ref 82–98)
MONOCYTES # BLD AUTO: 0.76 THOUSAND/ΜL (ref 0.17–1.22)
MONOCYTES NFR BLD AUTO: 10 % (ref 4–12)
NEUTROPHILS # BLD AUTO: 4.5 THOUSANDS/ΜL (ref 1.85–7.62)
NEUTS SEG NFR BLD AUTO: 57 % (ref 43–75)
NRBC BLD AUTO-RTO: 0 /100 WBCS
PHOSPHATE SERPL-MCNC: 5.1 MG/DL (ref 2.7–4.5)
PLATELET # BLD AUTO: 213 THOUSANDS/UL (ref 149–390)
PMV BLD AUTO: 10.9 FL (ref 8.9–12.7)
POTASSIUM SERPL-SCNC: 4.3 MMOL/L (ref 3.5–5.3)
PTH-INTACT SERPL-MCNC: 807.4 PG/ML (ref 18.4–80.1)
RBC # BLD AUTO: 3.15 MILLION/UL (ref 3.88–5.62)
SODIUM SERPL-SCNC: 133 MMOL/L (ref 136–145)
WBC # BLD AUTO: 7.86 THOUSAND/UL (ref 4.31–10.16)

## 2020-10-27 PROCEDURE — 80048 BASIC METABOLIC PNL TOTAL CA: CPT | Performed by: PHYSICIAN ASSISTANT

## 2020-10-27 PROCEDURE — 82330 ASSAY OF CALCIUM: CPT | Performed by: SURGERY

## 2020-10-27 PROCEDURE — 85025 COMPLETE CBC W/AUTO DIFF WBC: CPT | Performed by: PHYSICIAN ASSISTANT

## 2020-10-27 PROCEDURE — 84100 ASSAY OF PHOSPHORUS: CPT | Performed by: INTERNAL MEDICINE

## 2020-10-27 PROCEDURE — 87040 BLOOD CULTURE FOR BACTERIA: CPT | Performed by: INTERNAL MEDICINE

## 2020-10-27 PROCEDURE — 99254 IP/OBS CNSLTJ NEW/EST MOD 60: CPT | Performed by: INTERNAL MEDICINE

## 2020-10-27 PROCEDURE — 99024 POSTOP FOLLOW-UP VISIT: CPT | Performed by: SURGERY

## 2020-10-27 PROCEDURE — 82330 ASSAY OF CALCIUM: CPT | Performed by: INTERNAL MEDICINE

## 2020-10-27 PROCEDURE — 83735 ASSAY OF MAGNESIUM: CPT | Performed by: INTERNAL MEDICINE

## 2020-10-27 PROCEDURE — 83970 ASSAY OF PARATHORMONE: CPT | Performed by: PHYSICIAN ASSISTANT

## 2020-10-27 PROCEDURE — 99232 SBSQ HOSP IP/OBS MODERATE 35: CPT | Performed by: INTERNAL MEDICINE

## 2020-10-27 RX ORDER — AMOXICILLIN 250 MG
1 CAPSULE ORAL
Status: DISCONTINUED | OUTPATIENT
Start: 2020-10-27 | End: 2020-10-29 | Stop reason: HOSPADM

## 2020-10-27 RX ORDER — CALCIUM GLUCONATE 20 MG/ML
1 INJECTION, SOLUTION INTRAVENOUS ONCE
Status: COMPLETED | OUTPATIENT
Start: 2020-10-27 | End: 2020-10-27

## 2020-10-27 RX ORDER — CALCIUM CARBONATE 200(500)MG
2000 TABLET,CHEWABLE ORAL 4 TIMES DAILY
Status: DISCONTINUED | OUTPATIENT
Start: 2020-10-27 | End: 2020-10-29 | Stop reason: HOSPADM

## 2020-10-27 RX ORDER — CALCITRIOL 0.25 UG/1
1 CAPSULE, LIQUID FILLED ORAL DAILY
Status: DISCONTINUED | OUTPATIENT
Start: 2020-10-27 | End: 2020-10-29 | Stop reason: HOSPADM

## 2020-10-27 RX ORDER — CALCITRIOL 0.25 UG/1
1 CAPSULE, LIQUID FILLED ORAL ONCE
Status: COMPLETED | OUTPATIENT
Start: 2020-10-27 | End: 2020-10-27

## 2020-10-27 RX ADMIN — CALCIUM CARBONATE (ANTACID) CHEW TAB 500 MG 2000 MG: 500 CHEW TAB at 09:05

## 2020-10-27 RX ADMIN — ACETAMINOPHEN 650 MG: 325 TABLET, FILM COATED ORAL at 06:30

## 2020-10-27 RX ADMIN — CALCIUM CARBONATE (ANTACID) CHEW TAB 500 MG 2000 MG: 500 CHEW TAB at 11:48

## 2020-10-27 RX ADMIN — HEPARIN SODIUM 5000 UNITS: 5000 INJECTION INTRAVENOUS; SUBCUTANEOUS at 13:19

## 2020-10-27 RX ADMIN — HEPARIN SODIUM 5000 UNITS: 5000 INJECTION INTRAVENOUS; SUBCUTANEOUS at 06:30

## 2020-10-27 RX ADMIN — CALCITRIOL 1 MCG: 0.25 CAPSULE, LIQUID FILLED ORAL at 20:45

## 2020-10-27 RX ADMIN — HEPARIN SODIUM 5000 UNITS: 5000 INJECTION INTRAVENOUS; SUBCUTANEOUS at 22:29

## 2020-10-27 RX ADMIN — CALCIUM CARBONATE (ANTACID) CHEW TAB 500 MG 2000 MG: 500 CHEW TAB at 22:27

## 2020-10-27 RX ADMIN — NIFEDIPINE 90 MG: 60 TABLET, FILM COATED, EXTENDED RELEASE ORAL at 09:05

## 2020-10-27 RX ADMIN — CARVEDILOL 25 MG: 25 TABLET, FILM COATED ORAL at 09:05

## 2020-10-27 RX ADMIN — Medication 1 CAPSULE: at 16:57

## 2020-10-27 RX ADMIN — HYDRALAZINE HYDROCHLORIDE 75 MG: 25 TABLET, FILM COATED ORAL at 13:18

## 2020-10-27 RX ADMIN — HYDRALAZINE HYDROCHLORIDE 75 MG: 25 TABLET, FILM COATED ORAL at 06:30

## 2020-10-27 RX ADMIN — CARVEDILOL 25 MG: 25 TABLET, FILM COATED ORAL at 16:57

## 2020-10-27 RX ADMIN — CALCITRIOL 1 MCG: 0.25 CAPSULE, LIQUID FILLED ORAL at 09:05

## 2020-10-27 RX ADMIN — CALCIUM CARBONATE (ANTACID) CHEW TAB 500 MG 2000 MG: 500 CHEW TAB at 17:54

## 2020-10-27 RX ADMIN — CALCIUM GLUCONATE 1 G: 20 INJECTION, SOLUTION INTRAVENOUS at 01:41

## 2020-10-27 RX ADMIN — HYDRALAZINE HYDROCHLORIDE 75 MG: 25 TABLET, FILM COATED ORAL at 22:28

## 2020-10-28 ENCOUNTER — APPOINTMENT (INPATIENT)
Dept: DIALYSIS | Facility: HOSPITAL | Age: 45
DRG: 447 | End: 2020-10-28
Payer: COMMERCIAL

## 2020-10-28 LAB
ANION GAP SERPL CALCULATED.3IONS-SCNC: 11 MMOL/L (ref 4–13)
ATRIAL RATE: 91 BPM
BASOPHILS # BLD AUTO: 0.03 THOUSANDS/ΜL (ref 0–0.1)
BASOPHILS NFR BLD AUTO: 0 % (ref 0–1)
BUN SERPL-MCNC: 67 MG/DL (ref 5–25)
CA-I BLD-SCNC: 0.76 MMOL/L (ref 1.12–1.32)
CA-I BLD-SCNC: 0.79 MMOL/L (ref 1.12–1.32)
CA-I BLD-SCNC: 0.8 MMOL/L (ref 1.12–1.32)
CA-I BLD-SCNC: 0.9 MMOL/L (ref 1.12–1.32)
CALCIUM SERPL-MCNC: 6.3 MG/DL (ref 8.3–10.1)
CHLORIDE SERPL-SCNC: 98 MMOL/L (ref 100–108)
CO2 SERPL-SCNC: 25 MMOL/L (ref 21–32)
CREAT SERPL-MCNC: 14.5 MG/DL (ref 0.6–1.3)
EOSINOPHIL # BLD AUTO: 0.31 THOUSAND/ΜL (ref 0–0.61)
EOSINOPHIL NFR BLD AUTO: 4 % (ref 0–6)
ERYTHROCYTE [DISTWIDTH] IN BLOOD BY AUTOMATED COUNT: 16.4 % (ref 11.6–15.1)
GFR SERPL CREATININE-BSD FRML MDRD: 4 ML/MIN/1.73SQ M
GLUCOSE SERPL-MCNC: 92 MG/DL (ref 65–140)
HCT VFR BLD AUTO: 26.6 % (ref 36.5–49.3)
HGB BLD-MCNC: 8.1 G/DL (ref 12–17)
IMM GRANULOCYTES # BLD AUTO: 0.04 THOUSAND/UL (ref 0–0.2)
IMM GRANULOCYTES NFR BLD AUTO: 1 % (ref 0–2)
LYMPHOCYTES # BLD AUTO: 2.08 THOUSANDS/ΜL (ref 0.6–4.47)
LYMPHOCYTES NFR BLD AUTO: 29 % (ref 14–44)
MCH RBC QN AUTO: 26.6 PG (ref 26.8–34.3)
MCHC RBC AUTO-ENTMCNC: 30.5 G/DL (ref 31.4–37.4)
MCV RBC AUTO: 88 FL (ref 82–98)
MONOCYTES # BLD AUTO: 0.74 THOUSAND/ΜL (ref 0.17–1.22)
MONOCYTES NFR BLD AUTO: 10 % (ref 4–12)
NEUTROPHILS # BLD AUTO: 4.02 THOUSANDS/ΜL (ref 1.85–7.62)
NEUTS SEG NFR BLD AUTO: 56 % (ref 43–75)
NRBC BLD AUTO-RTO: 0 /100 WBCS
P AXIS: 65 DEGREES
PLATELET # BLD AUTO: 215 THOUSANDS/UL (ref 149–390)
PMV BLD AUTO: 10.5 FL (ref 8.9–12.7)
POTASSIUM SERPL-SCNC: 4.3 MMOL/L (ref 3.5–5.3)
PR INTERVAL: 226 MS
QRS AXIS: 6 DEGREES
QRSD INTERVAL: 110 MS
QT INTERVAL: 394 MS
QTC INTERVAL: 484 MS
RBC # BLD AUTO: 3.04 MILLION/UL (ref 3.88–5.62)
SODIUM SERPL-SCNC: 134 MMOL/L (ref 136–145)
T WAVE AXIS: 52 DEGREES
VENTRICULAR RATE: 91 BPM
WBC # BLD AUTO: 7.22 THOUSAND/UL (ref 4.31–10.16)

## 2020-10-28 PROCEDURE — NC001 PR NO CHARGE: Performed by: STUDENT IN AN ORGANIZED HEALTH CARE EDUCATION/TRAINING PROGRAM

## 2020-10-28 PROCEDURE — 82330 ASSAY OF CALCIUM: CPT | Performed by: INTERNAL MEDICINE

## 2020-10-28 PROCEDURE — 93010 ELECTROCARDIOGRAM REPORT: CPT | Performed by: INTERNAL MEDICINE

## 2020-10-28 PROCEDURE — 80048 BASIC METABOLIC PNL TOTAL CA: CPT | Performed by: INTERNAL MEDICINE

## 2020-10-28 PROCEDURE — 99252 IP/OBS CONSLTJ NEW/EST SF 35: CPT | Performed by: STUDENT IN AN ORGANIZED HEALTH CARE EDUCATION/TRAINING PROGRAM

## 2020-10-28 PROCEDURE — 99232 SBSQ HOSP IP/OBS MODERATE 35: CPT | Performed by: INTERNAL MEDICINE

## 2020-10-28 PROCEDURE — 85025 COMPLETE CBC W/AUTO DIFF WBC: CPT | Performed by: INTERNAL MEDICINE

## 2020-10-28 PROCEDURE — 99024 POSTOP FOLLOW-UP VISIT: CPT | Performed by: SURGERY

## 2020-10-28 RX ORDER — CALCIUM GLUCONATE 20 MG/ML
2 INJECTION, SOLUTION INTRAVENOUS ONCE
Status: COMPLETED | OUTPATIENT
Start: 2020-10-28 | End: 2020-10-29

## 2020-10-28 RX ORDER — CALCIUM GLUCONATE 20 MG/ML
2 INJECTION, SOLUTION INTRAVENOUS ONCE
Status: COMPLETED | OUTPATIENT
Start: 2020-10-28 | End: 2020-10-28

## 2020-10-28 RX ORDER — CALCIUM GLUCONATE 20 MG/ML
1 INJECTION, SOLUTION INTRAVENOUS ONCE
Status: COMPLETED | OUTPATIENT
Start: 2020-10-28 | End: 2020-10-28

## 2020-10-28 RX ADMIN — HEPARIN SODIUM 5000 UNITS: 5000 INJECTION INTRAVENOUS; SUBCUTANEOUS at 13:40

## 2020-10-28 RX ADMIN — CALCIUM GLUCONATE 2 G: 20 INJECTION, SOLUTION INTRAVENOUS at 23:12

## 2020-10-28 RX ADMIN — CALCIUM CARBONATE (ANTACID) CHEW TAB 500 MG 2000 MG: 500 CHEW TAB at 23:12

## 2020-10-28 RX ADMIN — CALCIUM CARBONATE (ANTACID) CHEW TAB 500 MG 2000 MG: 500 CHEW TAB at 19:18

## 2020-10-28 RX ADMIN — HYDRALAZINE HYDROCHLORIDE 75 MG: 25 TABLET, FILM COATED ORAL at 13:39

## 2020-10-28 RX ADMIN — CALCIUM GLUCONATE 2 G: 20 INJECTION, SOLUTION INTRAVENOUS at 10:32

## 2020-10-28 RX ADMIN — CALCIUM GLUCONATE 1 G: 20 INJECTION, SOLUTION INTRAVENOUS at 01:12

## 2020-10-28 RX ADMIN — CALCIUM CARBONATE (ANTACID) CHEW TAB 500 MG 2000 MG: 500 CHEW TAB at 08:16

## 2020-10-28 RX ADMIN — CARVEDILOL 25 MG: 25 TABLET, FILM COATED ORAL at 08:16

## 2020-10-28 RX ADMIN — HYDRALAZINE HYDROCHLORIDE 75 MG: 25 TABLET, FILM COATED ORAL at 22:15

## 2020-10-28 RX ADMIN — Medication 1 CAPSULE: at 19:21

## 2020-10-28 RX ADMIN — OXYCODONE HYDROCHLORIDE 10 MG: 10 TABLET ORAL at 19:18

## 2020-10-28 RX ADMIN — CALCIUM CARBONATE (ANTACID) CHEW TAB 500 MG 2000 MG: 500 CHEW TAB at 13:39

## 2020-10-28 RX ADMIN — HEPARIN SODIUM 5000 UNITS: 5000 INJECTION INTRAVENOUS; SUBCUTANEOUS at 05:27

## 2020-10-28 RX ADMIN — NIFEDIPINE 90 MG: 60 TABLET, FILM COATED, EXTENDED RELEASE ORAL at 08:15

## 2020-10-28 RX ADMIN — CARVEDILOL 25 MG: 25 TABLET, FILM COATED ORAL at 19:18

## 2020-10-28 RX ADMIN — EPOETIN ALFA 10000 UNITS: 10000 SOLUTION INTRAVENOUS; SUBCUTANEOUS at 16:07

## 2020-10-28 RX ADMIN — HYDRALAZINE HYDROCHLORIDE 75 MG: 25 TABLET, FILM COATED ORAL at 05:27

## 2020-10-28 RX ADMIN — CALCITRIOL 1 MCG: 0.25 CAPSULE, LIQUID FILLED ORAL at 08:15

## 2020-10-29 ENCOUNTER — APPOINTMENT (INPATIENT)
Dept: RADIOLOGY | Facility: HOSPITAL | Age: 45
DRG: 447 | End: 2020-10-29
Payer: COMMERCIAL

## 2020-10-29 VITALS
BODY MASS INDEX: 30.8 KG/M2 | SYSTOLIC BLOOD PRESSURE: 143 MMHG | WEIGHT: 215.17 LBS | HEIGHT: 70 IN | OXYGEN SATURATION: 96 % | DIASTOLIC BLOOD PRESSURE: 84 MMHG | HEART RATE: 83 BPM | RESPIRATION RATE: 18 BRPM | TEMPERATURE: 98.7 F

## 2020-10-29 LAB
ALBUMIN SERPL BCP-MCNC: 3.1 G/DL (ref 3.5–5)
ANION GAP SERPL CALCULATED.3IONS-SCNC: 7 MMOL/L (ref 4–13)
BASOPHILS # BLD AUTO: 0.05 THOUSANDS/ΜL (ref 0–0.1)
BASOPHILS NFR BLD AUTO: 1 % (ref 0–1)
BUN SERPL-MCNC: 48 MG/DL (ref 5–25)
CA-I BLD-SCNC: 0.8 MMOL/L (ref 1.12–1.32)
CALCIUM SERPL-MCNC: 6.6 MG/DL (ref 8.3–10.1)
CHLORIDE SERPL-SCNC: 99 MMOL/L (ref 100–108)
CO2 SERPL-SCNC: 29 MMOL/L (ref 21–32)
CREAT SERPL-MCNC: 11.1 MG/DL (ref 0.6–1.3)
EOSINOPHIL # BLD AUTO: 0.3 THOUSAND/ΜL (ref 0–0.61)
EOSINOPHIL NFR BLD AUTO: 5 % (ref 0–6)
ERYTHROCYTE [DISTWIDTH] IN BLOOD BY AUTOMATED COUNT: 16.4 % (ref 11.6–15.1)
GFR SERPL CREATININE-BSD FRML MDRD: 6 ML/MIN/1.73SQ M
GLUCOSE SERPL-MCNC: 102 MG/DL (ref 65–140)
HCT VFR BLD AUTO: 26.7 % (ref 36.5–49.3)
HGB BLD-MCNC: 8.3 G/DL (ref 12–17)
IMM GRANULOCYTES # BLD AUTO: 0.04 THOUSAND/UL (ref 0–0.2)
IMM GRANULOCYTES NFR BLD AUTO: 1 % (ref 0–2)
INR PPP: 1.21 (ref 0.84–1.19)
LYMPHOCYTES # BLD AUTO: 1.51 THOUSANDS/ΜL (ref 0.6–4.47)
LYMPHOCYTES NFR BLD AUTO: 25 % (ref 14–44)
MCH RBC QN AUTO: 27.3 PG (ref 26.8–34.3)
MCHC RBC AUTO-ENTMCNC: 31.1 G/DL (ref 31.4–37.4)
MCV RBC AUTO: 88 FL (ref 82–98)
MONOCYTES # BLD AUTO: 0.71 THOUSAND/ΜL (ref 0.17–1.22)
MONOCYTES NFR BLD AUTO: 12 % (ref 4–12)
NEUTROPHILS # BLD AUTO: 3.55 THOUSANDS/ΜL (ref 1.85–7.62)
NEUTS SEG NFR BLD AUTO: 56 % (ref 43–75)
NRBC BLD AUTO-RTO: 0 /100 WBCS
PLATELET # BLD AUTO: 234 THOUSANDS/UL (ref 149–390)
PMV BLD AUTO: 10.8 FL (ref 8.9–12.7)
POTASSIUM SERPL-SCNC: 4.6 MMOL/L (ref 3.5–5.3)
PROTHROMBIN TIME: 15.3 SECONDS (ref 11.6–14.5)
RBC # BLD AUTO: 3.04 MILLION/UL (ref 3.88–5.62)
SODIUM SERPL-SCNC: 135 MMOL/L (ref 136–145)
WBC # BLD AUTO: 6.16 THOUSAND/UL (ref 4.31–10.16)

## 2020-10-29 PROCEDURE — 36558 INSERT TUNNELED CV CATH: CPT

## 2020-10-29 PROCEDURE — 99024 POSTOP FOLLOW-UP VISIT: CPT | Performed by: SURGERY

## 2020-10-29 PROCEDURE — 85610 PROTHROMBIN TIME: CPT | Performed by: PHYSICIAN ASSISTANT

## 2020-10-29 PROCEDURE — C1751 CATH, INF, PER/CENT/MIDLINE: HCPCS

## 2020-10-29 PROCEDURE — C1769 GUIDE WIRE: HCPCS

## 2020-10-29 PROCEDURE — 0JHF3XZ INSERTION OF TUNNELED VASCULAR ACCESS DEVICE INTO LEFT UPPER ARM SUBCUTANEOUS TISSUE AND FASCIA, PERCUTANEOUS APPROACH: ICD-10-PCS | Performed by: RADIOLOGY

## 2020-10-29 PROCEDURE — 36558 INSERT TUNNELED CV CATH: CPT | Performed by: RADIOLOGY

## 2020-10-29 PROCEDURE — 77001 FLUOROGUIDE FOR VEIN DEVICE: CPT

## 2020-10-29 PROCEDURE — 76937 US GUIDE VASCULAR ACCESS: CPT

## 2020-10-29 PROCEDURE — 99153 MOD SED SAME PHYS/QHP EA: CPT

## 2020-10-29 PROCEDURE — 85025 COMPLETE CBC W/AUTO DIFF WBC: CPT | Performed by: INTERNAL MEDICINE

## 2020-10-29 PROCEDURE — 99152 MOD SED SAME PHYS/QHP 5/>YRS: CPT

## 2020-10-29 PROCEDURE — 05HN33Z INSERTION OF INFUSION DEVICE INTO LEFT INTERNAL JUGULAR VEIN, PERCUTANEOUS APPROACH: ICD-10-PCS | Performed by: RADIOLOGY

## 2020-10-29 PROCEDURE — NC001 PR NO CHARGE: Performed by: PHYSICIAN ASSISTANT

## 2020-10-29 PROCEDURE — 77001 FLUOROGUIDE FOR VEIN DEVICE: CPT | Performed by: RADIOLOGY

## 2020-10-29 PROCEDURE — 99152 MOD SED SAME PHYS/QHP 5/>YRS: CPT | Performed by: RADIOLOGY

## 2020-10-29 PROCEDURE — 82040 ASSAY OF SERUM ALBUMIN: CPT | Performed by: INTERNAL MEDICINE

## 2020-10-29 PROCEDURE — 82330 ASSAY OF CALCIUM: CPT | Performed by: INTERNAL MEDICINE

## 2020-10-29 PROCEDURE — 99232 SBSQ HOSP IP/OBS MODERATE 35: CPT | Performed by: INTERNAL MEDICINE

## 2020-10-29 PROCEDURE — 80048 BASIC METABOLIC PNL TOTAL CA: CPT | Performed by: INTERNAL MEDICINE

## 2020-10-29 PROCEDURE — 76937 US GUIDE VASCULAR ACCESS: CPT | Performed by: RADIOLOGY

## 2020-10-29 RX ORDER — MIDAZOLAM HYDROCHLORIDE 2 MG/2ML
INJECTION, SOLUTION INTRAMUSCULAR; INTRAVENOUS CODE/TRAUMA/SEDATION MEDICATION
Status: COMPLETED | OUTPATIENT
Start: 2020-10-29 | End: 2020-10-29

## 2020-10-29 RX ORDER — OXYCODONE HYDROCHLORIDE 5 MG/1
5 TABLET ORAL EVERY 6 HOURS PRN
Qty: 12 TABLET | Refills: 0 | Status: SHIPPED | OUTPATIENT
Start: 2020-10-29 | End: 2020-11-08

## 2020-10-29 RX ORDER — FENTANYL CITRATE 50 UG/ML
INJECTION, SOLUTION INTRAMUSCULAR; INTRAVENOUS CODE/TRAUMA/SEDATION MEDICATION
Status: COMPLETED | OUTPATIENT
Start: 2020-10-29 | End: 2020-10-29

## 2020-10-29 RX ORDER — CALCIUM CARBONATE 200(500)MG
2000 TABLET,CHEWABLE ORAL 4 TIMES DAILY
Qty: 220 TABLET | Refills: 0 | Status: ON HOLD | OUTPATIENT
Start: 2020-10-29 | End: 2020-11-08 | Stop reason: SDUPTHER

## 2020-10-29 RX ORDER — CALCITRIOL 0.5 UG/1
1 CAPSULE, LIQUID FILLED ORAL DAILY
Qty: 60 CAPSULE | Refills: 0 | Status: SHIPPED | OUTPATIENT
Start: 2020-10-30

## 2020-10-29 RX ADMIN — FENTANYL CITRATE 50 MCG: 50 INJECTION, SOLUTION INTRAMUSCULAR; INTRAVENOUS at 16:39

## 2020-10-29 RX ADMIN — CARVEDILOL 25 MG: 25 TABLET, FILM COATED ORAL at 08:32

## 2020-10-29 RX ADMIN — CALCIUM CARBONATE (ANTACID) CHEW TAB 500 MG 2000 MG: 500 CHEW TAB at 08:28

## 2020-10-29 RX ADMIN — MIDAZOLAM 1 MG: 1 INJECTION INTRAMUSCULAR; INTRAVENOUS at 16:39

## 2020-10-29 RX ADMIN — NIFEDIPINE 90 MG: 60 TABLET, FILM COATED, EXTENDED RELEASE ORAL at 08:32

## 2020-10-29 RX ADMIN — CALCIUM CARBONATE (ANTACID) CHEW TAB 500 MG 2000 MG: 500 CHEW TAB at 11:36

## 2020-10-29 RX ADMIN — CALCITRIOL 1 MCG: 0.25 CAPSULE, LIQUID FILLED ORAL at 08:28

## 2020-10-29 RX ADMIN — HYDRALAZINE HYDROCHLORIDE 75 MG: 25 TABLET, FILM COATED ORAL at 14:27

## 2020-11-01 LAB — BACTERIA BLD CULT: NORMAL

## 2020-11-02 ENCOUNTER — LAB (OUTPATIENT)
Dept: LAB | Facility: HOSPITAL | Age: 45
End: 2020-11-02
Attending: INTERNAL MEDICINE
Payer: COMMERCIAL

## 2020-11-02 ENCOUNTER — HOSPITAL ENCOUNTER (OUTPATIENT)
Dept: INFUSION CENTER | Facility: CLINIC | Age: 45
Discharge: HOME | End: 2020-11-02
Payer: COMMERCIAL

## 2020-11-02 VITALS
RESPIRATION RATE: 16 BRPM | TEMPERATURE: 97.3 F | HEART RATE: 88 BPM | DIASTOLIC BLOOD PRESSURE: 76 MMHG | SYSTOLIC BLOOD PRESSURE: 131 MMHG

## 2020-11-02 DIAGNOSIS — E83.51 HYPOCALCEMIA: ICD-10-CM

## 2020-11-02 DIAGNOSIS — E83.51 HYPOCALCEMIA: Primary | ICD-10-CM

## 2020-11-02 LAB — CA-I BLD-SCNC: 0.86 MMOL/L (ref 1.12–1.32)

## 2020-11-02 PROCEDURE — 82330 ASSAY OF CALCIUM: CPT

## 2020-11-02 PROCEDURE — 96366 THER/PROPH/DIAG IV INF ADDON: CPT

## 2020-11-02 PROCEDURE — 96365 THER/PROPH/DIAG IV INF INIT: CPT

## 2020-11-02 RX ADMIN — CALCIUM GLUCONATE 3 G: 98 INJECTION, SOLUTION INTRAVENOUS at 13:33

## 2020-11-05 ENCOUNTER — HOSPITAL ENCOUNTER (OUTPATIENT)
Dept: INFUSION CENTER | Facility: CLINIC | Age: 45
Discharge: HOME | DRG: 425 | End: 2020-11-05
Payer: COMMERCIAL

## 2020-11-05 VITALS
SYSTOLIC BLOOD PRESSURE: 131 MMHG | RESPIRATION RATE: 18 BRPM | HEART RATE: 88 BPM | TEMPERATURE: 97.7 F | DIASTOLIC BLOOD PRESSURE: 73 MMHG | OXYGEN SATURATION: 97 %

## 2020-11-05 DIAGNOSIS — E83.51 HYPOCALCEMIA: Primary | ICD-10-CM

## 2020-11-05 PROBLEM — Z90.89 STATUS POST PARATHYROIDECTOMY: Status: ACTIVE | Noted: 2020-11-05

## 2020-11-05 PROBLEM — Z98.890 STATUS POST PARATHYROIDECTOMY: Status: ACTIVE | Noted: 2020-11-05

## 2020-11-05 PROBLEM — E89.2 STATUS POST PARATHYROIDECTOMY (HCC): Status: ACTIVE | Noted: 2020-11-05

## 2020-11-05 LAB — CA-I BLD-SCNC: 0.82 MMOL/L (ref 1.12–1.32)

## 2020-11-05 PROCEDURE — 82330 ASSAY OF CALCIUM: CPT | Performed by: INTERNAL MEDICINE

## 2020-11-05 PROCEDURE — 96366 THER/PROPH/DIAG IV INF ADDON: CPT

## 2020-11-05 PROCEDURE — 96365 THER/PROPH/DIAG IV INF INIT: CPT

## 2020-11-05 RX ORDER — SODIUM CHLORIDE 9 MG/ML
20 INJECTION, SOLUTION INTRAVENOUS ONCE
Status: CANCELLED
Start: 2020-11-05

## 2020-11-05 RX ORDER — SODIUM CHLORIDE 9 MG/ML
20 INJECTION, SOLUTION INTRAVENOUS ONCE
Status: COMPLETED | OUTPATIENT
Start: 2020-11-05 | End: 2020-11-05

## 2020-11-05 RX ADMIN — SODIUM CHLORIDE 20 ML/HR: 9 INJECTION, SOLUTION INTRAVENOUS at 09:59

## 2020-11-05 RX ADMIN — CALCIUM GLUCONATE 3 G: 98 INJECTION, SOLUTION INTRAVENOUS at 10:01

## 2020-11-06 ENCOUNTER — HOSPITAL ENCOUNTER (INPATIENT)
Facility: HOSPITAL | Age: 45
LOS: 2 days | Discharge: HOME HEALTH | DRG: 425 | End: 2020-11-08
Attending: EMERGENCY MEDICINE | Admitting: STUDENT IN AN ORGANIZED HEALTH CARE EDUCATION/TRAINING PROGRAM
Payer: COMMERCIAL

## 2020-11-06 ENCOUNTER — APPOINTMENT (INPATIENT)
Dept: DIALYSIS | Facility: HOSPITAL | Age: 45
DRG: 425 | End: 2020-11-06
Payer: COMMERCIAL

## 2020-11-06 DIAGNOSIS — E83.51 HYPOCALCEMIA: ICD-10-CM

## 2020-11-06 DIAGNOSIS — R20.2 NUMBNESS AND TINGLING: ICD-10-CM

## 2020-11-06 DIAGNOSIS — E87.5 HYPERKALEMIA: Primary | ICD-10-CM

## 2020-11-06 DIAGNOSIS — Z99.2 END-STAGE RENAL DISEASE ON HEMODIALYSIS (HCC): ICD-10-CM

## 2020-11-06 DIAGNOSIS — S51.802A OPEN WOUND OF LEFT FOREARM, INITIAL ENCOUNTER: ICD-10-CM

## 2020-11-06 DIAGNOSIS — R20.0 NUMBNESS AND TINGLING: ICD-10-CM

## 2020-11-06 DIAGNOSIS — N18.6 END-STAGE RENAL DISEASE ON HEMODIALYSIS (HCC): ICD-10-CM

## 2020-11-06 LAB
ANION GAP SERPL CALCULATED.3IONS-SCNC: 10 MMOL/L (ref 4–13)
ATRIAL RATE: 79 BPM
ATRIAL RATE: 89 BPM
BASOPHILS # BLD AUTO: 0.04 THOUSANDS/ΜL (ref 0–0.1)
BASOPHILS NFR BLD AUTO: 1 % (ref 0–1)
BUN SERPL-MCNC: 72 MG/DL (ref 5–25)
CA-I BLD-SCNC: 0.84 MMOL/L (ref 1.12–1.32)
CALCIUM SERPL-MCNC: 7.5 MG/DL (ref 8.3–10.1)
CHLORIDE SERPL-SCNC: 99 MMOL/L (ref 100–108)
CO2 SERPL-SCNC: 28 MMOL/L (ref 21–32)
CREAT SERPL-MCNC: 15.82 MG/DL (ref 0.6–1.3)
EOSINOPHIL # BLD AUTO: 0.29 THOUSAND/ΜL (ref 0–0.61)
EOSINOPHIL NFR BLD AUTO: 4 % (ref 0–6)
ERYTHROCYTE [DISTWIDTH] IN BLOOD BY AUTOMATED COUNT: 16.5 % (ref 11.6–15.1)
GFR SERPL CREATININE-BSD FRML MDRD: 4 ML/MIN/1.73SQ M
GLUCOSE SERPL-MCNC: 107 MG/DL (ref 65–140)
GLUCOSE SERPL-MCNC: 181 MG/DL (ref 65–140)
GLUCOSE SERPL-MCNC: 86 MG/DL (ref 65–140)
HCT VFR BLD AUTO: 27.3 % (ref 36.5–49.3)
HGB BLD-MCNC: 8.2 G/DL (ref 12–17)
IMM GRANULOCYTES # BLD AUTO: 0.03 THOUSAND/UL (ref 0–0.2)
IMM GRANULOCYTES NFR BLD AUTO: 0 % (ref 0–2)
LYMPHOCYTES # BLD AUTO: 1.92 THOUSANDS/ΜL (ref 0.6–4.47)
LYMPHOCYTES NFR BLD AUTO: 24 % (ref 14–44)
MAGNESIUM SERPL-MCNC: 2.1 MG/DL (ref 1.6–2.6)
MCH RBC QN AUTO: 26.8 PG (ref 26.8–34.3)
MCHC RBC AUTO-ENTMCNC: 30 G/DL (ref 31.4–37.4)
MCV RBC AUTO: 89 FL (ref 82–98)
MONOCYTES # BLD AUTO: 0.63 THOUSAND/ΜL (ref 0.17–1.22)
MONOCYTES NFR BLD AUTO: 8 % (ref 4–12)
NEUTROPHILS # BLD AUTO: 5.26 THOUSANDS/ΜL (ref 1.85–7.62)
NEUTS SEG NFR BLD AUTO: 63 % (ref 43–75)
NRBC BLD AUTO-RTO: 0 /100 WBCS
P AXIS: 57 DEGREES
P AXIS: 60 DEGREES
PLATELET # BLD AUTO: 310 THOUSANDS/UL (ref 149–390)
PMV BLD AUTO: 9.4 FL (ref 8.9–12.7)
POTASSIUM SERPL-SCNC: 4.6 MMOL/L (ref 3.5–5.3)
POTASSIUM SERPL-SCNC: 7.1 MMOL/L (ref 3.5–5.3)
POTASSIUM SERPL-SCNC: 8.6 MMOL/L (ref 3.5–5.3)
PR INTERVAL: 204 MS
PR INTERVAL: 216 MS
QRS AXIS: -21 DEGREES
QRS AXIS: -23 DEGREES
QRSD INTERVAL: 104 MS
QRSD INTERVAL: 110 MS
QT INTERVAL: 408 MS
QT INTERVAL: 430 MS
QTC INTERVAL: 493 MS
QTC INTERVAL: 496 MS
RBC # BLD AUTO: 3.06 MILLION/UL (ref 3.88–5.62)
SODIUM SERPL-SCNC: 137 MMOL/L (ref 136–145)
T WAVE AXIS: 55 DEGREES
T WAVE AXIS: 56 DEGREES
VENTRICULAR RATE: 79 BPM
VENTRICULAR RATE: 89 BPM
WBC # BLD AUTO: 8.17 THOUSAND/UL (ref 4.31–10.16)

## 2020-11-06 PROCEDURE — 90935 HEMODIALYSIS ONE EVALUATION: CPT | Performed by: INTERNAL MEDICINE

## 2020-11-06 PROCEDURE — 93005 ELECTROCARDIOGRAM TRACING: CPT

## 2020-11-06 PROCEDURE — 36415 COLL VENOUS BLD VENIPUNCTURE: CPT | Performed by: EMERGENCY MEDICINE

## 2020-11-06 PROCEDURE — 99223 1ST HOSP IP/OBS HIGH 75: CPT | Performed by: INTERNAL MEDICINE

## 2020-11-06 PROCEDURE — 84132 ASSAY OF SERUM POTASSIUM: CPT | Performed by: PHYSICIAN ASSISTANT

## 2020-11-06 PROCEDURE — 99255 IP/OBS CONSLTJ NEW/EST HI 80: CPT | Performed by: INTERNAL MEDICINE

## 2020-11-06 PROCEDURE — 85025 COMPLETE CBC W/AUTO DIFF WBC: CPT | Performed by: EMERGENCY MEDICINE

## 2020-11-06 PROCEDURE — 5A1D70Z PERFORMANCE OF URINARY FILTRATION, INTERMITTENT, LESS THAN 6 HOURS PER DAY: ICD-10-PCS | Performed by: INTERNAL MEDICINE

## 2020-11-06 PROCEDURE — 93010 ELECTROCARDIOGRAM REPORT: CPT | Performed by: INTERNAL MEDICINE

## 2020-11-06 PROCEDURE — 83735 ASSAY OF MAGNESIUM: CPT | Performed by: EMERGENCY MEDICINE

## 2020-11-06 PROCEDURE — 96375 TX/PRO/DX INJ NEW DRUG ADDON: CPT

## 2020-11-06 PROCEDURE — 99255 IP/OBS CONSLTJ NEW/EST HI 80: CPT | Performed by: STUDENT IN AN ORGANIZED HEALTH CARE EDUCATION/TRAINING PROGRAM

## 2020-11-06 PROCEDURE — 96365 THER/PROPH/DIAG IV INF INIT: CPT

## 2020-11-06 PROCEDURE — 99285 EMERGENCY DEPT VISIT HI MDM: CPT | Performed by: EMERGENCY MEDICINE

## 2020-11-06 PROCEDURE — 82948 REAGENT STRIP/BLOOD GLUCOSE: CPT

## 2020-11-06 PROCEDURE — 82330 ASSAY OF CALCIUM: CPT | Performed by: EMERGENCY MEDICINE

## 2020-11-06 PROCEDURE — NC001 PR NO CHARGE: Performed by: INTERNAL MEDICINE

## 2020-11-06 PROCEDURE — 80048 BASIC METABOLIC PNL TOTAL CA: CPT | Performed by: EMERGENCY MEDICINE

## 2020-11-06 PROCEDURE — 99285 EMERGENCY DEPT VISIT HI MDM: CPT

## 2020-11-06 RX ORDER — ONDANSETRON 2 MG/ML
4 INJECTION INTRAMUSCULAR; INTRAVENOUS ONCE
Status: COMPLETED | OUTPATIENT
Start: 2020-11-06 | End: 2020-11-06

## 2020-11-06 RX ORDER — HEPARIN SODIUM 5000 [USP'U]/ML
5000 INJECTION, SOLUTION INTRAVENOUS; SUBCUTANEOUS EVERY 8 HOURS SCHEDULED
Status: DISCONTINUED | OUTPATIENT
Start: 2020-11-06 | End: 2020-11-08 | Stop reason: HOSPADM

## 2020-11-06 RX ORDER — CALCITRIOL 0.25 UG/1
1 CAPSULE, LIQUID FILLED ORAL DAILY
Status: DISCONTINUED | OUTPATIENT
Start: 2020-11-06 | End: 2020-11-08 | Stop reason: HOSPADM

## 2020-11-06 RX ORDER — ACETAMINOPHEN 325 MG/1
650 TABLET ORAL EVERY 6 HOURS PRN
Status: DISCONTINUED | OUTPATIENT
Start: 2020-11-06 | End: 2020-11-08 | Stop reason: HOSPADM

## 2020-11-06 RX ORDER — CARVEDILOL 12.5 MG/1
25 TABLET ORAL 2 TIMES DAILY WITH MEALS
Status: DISCONTINUED | OUTPATIENT
Start: 2020-11-06 | End: 2020-11-08 | Stop reason: HOSPADM

## 2020-11-06 RX ORDER — ALBUTEROL SULFATE 2.5 MG/3ML
10 SOLUTION RESPIRATORY (INHALATION) ONCE
Status: COMPLETED | OUTPATIENT
Start: 2020-11-06 | End: 2020-11-06

## 2020-11-06 RX ORDER — HYDRALAZINE HYDROCHLORIDE 25 MG/1
75 TABLET, FILM COATED ORAL EVERY 8 HOURS SCHEDULED
Status: DISCONTINUED | OUTPATIENT
Start: 2020-11-06 | End: 2020-11-08 | Stop reason: HOSPADM

## 2020-11-06 RX ORDER — NIFEDIPINE 30 MG/1
90 TABLET, EXTENDED RELEASE ORAL DAILY
Status: DISCONTINUED | OUTPATIENT
Start: 2020-11-06 | End: 2020-11-08 | Stop reason: HOSPADM

## 2020-11-06 RX ORDER — CALCIUM GLUCONATE 20 MG/ML
1 INJECTION, SOLUTION INTRAVENOUS ONCE
Status: COMPLETED | OUTPATIENT
Start: 2020-11-06 | End: 2020-11-06

## 2020-11-06 RX ORDER — CALCIUM CARBONATE 200(500)MG
2000 TABLET,CHEWABLE ORAL 4 TIMES DAILY
Status: DISCONTINUED | OUTPATIENT
Start: 2020-11-06 | End: 2020-11-07

## 2020-11-06 RX ORDER — DEXTROSE MONOHYDRATE 25 G/50ML
25 INJECTION, SOLUTION INTRAVENOUS ONCE
Status: COMPLETED | OUTPATIENT
Start: 2020-11-06 | End: 2020-11-06

## 2020-11-06 RX ORDER — DOCUSATE SODIUM 100 MG/1
100 CAPSULE, LIQUID FILLED ORAL 2 TIMES DAILY
Status: DISCONTINUED | OUTPATIENT
Start: 2020-11-06 | End: 2020-11-08 | Stop reason: HOSPADM

## 2020-11-06 RX ORDER — ONDANSETRON 2 MG/ML
4 INJECTION INTRAMUSCULAR; INTRAVENOUS EVERY 6 HOURS PRN
Status: DISCONTINUED | OUTPATIENT
Start: 2020-11-06 | End: 2020-11-08 | Stop reason: HOSPADM

## 2020-11-06 RX ADMIN — HEPARIN SODIUM 5000 UNITS: 5000 INJECTION INTRAVENOUS; SUBCUTANEOUS at 05:17

## 2020-11-06 RX ADMIN — CALCIUM GLUCONATE 1 G: 20 INJECTION, SOLUTION INTRAVENOUS at 02:15

## 2020-11-06 RX ADMIN — DEXTROSE MONOHYDRATE 25 ML: 25 INJECTION, SOLUTION INTRAVENOUS at 03:00

## 2020-11-06 RX ADMIN — ONDANSETRON 4 MG: 2 INJECTION INTRAMUSCULAR; INTRAVENOUS at 02:37

## 2020-11-06 RX ADMIN — HYDRALAZINE HYDROCHLORIDE 75 MG: 25 TABLET ORAL at 21:01

## 2020-11-06 RX ADMIN — DOCUSATE SODIUM 100 MG: 100 CAPSULE, LIQUID FILLED ORAL at 17:59

## 2020-11-06 RX ADMIN — CALCIUM GLUCONATE 1 G: 20 INJECTION, SOLUTION INTRAVENOUS at 03:55

## 2020-11-06 RX ADMIN — DOCUSATE SODIUM 100 MG: 100 CAPSULE, LIQUID FILLED ORAL at 09:53

## 2020-11-06 RX ADMIN — CALCIUM CARBONATE (ANTACID) CHEW TAB 500 MG 2000 MG: 500 CHEW TAB at 17:58

## 2020-11-06 RX ADMIN — CALCIUM CARBONATE (ANTACID) CHEW TAB 500 MG 2000 MG: 500 CHEW TAB at 21:01

## 2020-11-06 RX ADMIN — CARVEDILOL 25 MG: 12.5 TABLET, FILM COATED ORAL at 16:12

## 2020-11-06 RX ADMIN — CALCIUM CARBONATE (ANTACID) CHEW TAB 500 MG 2000 MG: 500 CHEW TAB at 09:53

## 2020-11-06 RX ADMIN — NIFEDIPINE 90 MG: 30 TABLET, EXTENDED RELEASE ORAL at 09:54

## 2020-11-06 RX ADMIN — CALCITRIOL CAPSULES 0.25 MCG 1 MCG: 0.25 CAPSULE ORAL at 09:53

## 2020-11-06 RX ADMIN — INSULIN HUMAN 10 UNITS: 100 INJECTION, SOLUTION PARENTERAL at 02:59

## 2020-11-06 RX ADMIN — COLLAGENASE SANTYL: 250 OINTMENT TOPICAL at 09:55

## 2020-11-06 RX ADMIN — ALBUTEROL SULFATE 10 MG: 2.5 SOLUTION RESPIRATORY (INHALATION) at 02:59

## 2020-11-06 RX ADMIN — HYDRALAZINE HYDROCHLORIDE 75 MG: 25 TABLET ORAL at 16:11

## 2020-11-06 RX ADMIN — CALCIUM CARBONATE (ANTACID) CHEW TAB 500 MG 2000 MG: 500 CHEW TAB at 12:40

## 2020-11-06 RX ADMIN — HEPARIN SODIUM 5000 UNITS: 5000 INJECTION INTRAVENOUS; SUBCUTANEOUS at 14:55

## 2020-11-06 RX ADMIN — CARVEDILOL 25 MG: 12.5 TABLET, FILM COATED ORAL at 10:03

## 2020-11-07 ENCOUNTER — APPOINTMENT (INPATIENT)
Dept: DIALYSIS | Facility: HOSPITAL | Age: 45
DRG: 425 | End: 2020-11-07
Payer: COMMERCIAL

## 2020-11-07 PROBLEM — S41.102A WOUND OF LEFT UPPER EXTREMITY: Status: ACTIVE | Noted: 2020-11-07

## 2020-11-07 LAB
ALBUMIN SERPL BCP-MCNC: 3 G/DL (ref 3.5–5)
ALP SERPL-CCNC: 461 U/L (ref 46–116)
ALT SERPL W P-5'-P-CCNC: 21 U/L (ref 12–78)
ANION GAP SERPL CALCULATED.3IONS-SCNC: 9 MMOL/L (ref 4–13)
AST SERPL W P-5'-P-CCNC: 9 U/L (ref 5–45)
BASOPHILS # BLD AUTO: 0.05 THOUSANDS/ΜL (ref 0–0.1)
BASOPHILS NFR BLD AUTO: 1 % (ref 0–1)
BILIRUB SERPL-MCNC: 0.3 MG/DL (ref 0.2–1)
BUN SERPL-MCNC: 48 MG/DL (ref 5–25)
CA-I BLD-SCNC: 0.77 MMOL/L (ref 1.12–1.32)
CALCIUM ALBUM COR SERPL-MCNC: 6.9 MG/DL (ref 8.3–10.1)
CALCIUM SERPL-MCNC: 6.1 MG/DL (ref 8.3–10.1)
CHLORIDE SERPL-SCNC: 99 MMOL/L (ref 100–108)
CO2 SERPL-SCNC: 33 MMOL/L (ref 21–32)
CREAT SERPL-MCNC: 12.08 MG/DL (ref 0.6–1.3)
EOSINOPHIL # BLD AUTO: 0.24 THOUSAND/ΜL (ref 0–0.61)
EOSINOPHIL NFR BLD AUTO: 4 % (ref 0–6)
ERYTHROCYTE [DISTWIDTH] IN BLOOD BY AUTOMATED COUNT: 16.3 % (ref 11.6–15.1)
GFR SERPL CREATININE-BSD FRML MDRD: 5 ML/MIN/1.73SQ M
GLUCOSE SERPL-MCNC: 88 MG/DL (ref 65–140)
HCT VFR BLD AUTO: 26.3 % (ref 36.5–49.3)
HGB BLD-MCNC: 8 G/DL (ref 12–17)
IMM GRANULOCYTES # BLD AUTO: 0.03 THOUSAND/UL (ref 0–0.2)
IMM GRANULOCYTES NFR BLD AUTO: 1 % (ref 0–2)
LYMPHOCYTES # BLD AUTO: 1.72 THOUSANDS/ΜL (ref 0.6–4.47)
LYMPHOCYTES NFR BLD AUTO: 30 % (ref 14–44)
MCH RBC QN AUTO: 27.4 PG (ref 26.8–34.3)
MCHC RBC AUTO-ENTMCNC: 30.4 G/DL (ref 31.4–37.4)
MCV RBC AUTO: 90 FL (ref 82–98)
MONOCYTES # BLD AUTO: 0.42 THOUSAND/ΜL (ref 0.17–1.22)
MONOCYTES NFR BLD AUTO: 7 % (ref 4–12)
NEUTROPHILS # BLD AUTO: 3.2 THOUSANDS/ΜL (ref 1.85–7.62)
NEUTS SEG NFR BLD AUTO: 57 % (ref 43–75)
NRBC BLD AUTO-RTO: 0 /100 WBCS
PHOSPHATE SERPL-MCNC: 4.9 MG/DL (ref 2.7–4.5)
PLATELET # BLD AUTO: 297 THOUSANDS/UL (ref 149–390)
PMV BLD AUTO: 9.7 FL (ref 8.9–12.7)
POTASSIUM SERPL-SCNC: 5.5 MMOL/L (ref 3.5–5.3)
PROT SERPL-MCNC: 7.4 G/DL (ref 6.4–8.2)
PTH-INTACT SERPL-MCNC: 1185 PG/ML (ref 18.4–80.1)
RBC # BLD AUTO: 2.92 MILLION/UL (ref 3.88–5.62)
SODIUM SERPL-SCNC: 141 MMOL/L (ref 136–145)
WBC # BLD AUTO: 5.66 THOUSAND/UL (ref 4.31–10.16)

## 2020-11-07 PROCEDURE — 80053 COMPREHEN METABOLIC PANEL: CPT | Performed by: INTERNAL MEDICINE

## 2020-11-07 PROCEDURE — 83970 ASSAY OF PARATHORMONE: CPT | Performed by: INTERNAL MEDICINE

## 2020-11-07 PROCEDURE — 82330 ASSAY OF CALCIUM: CPT | Performed by: INTERNAL MEDICINE

## 2020-11-07 PROCEDURE — 85025 COMPLETE CBC W/AUTO DIFF WBC: CPT | Performed by: INTERNAL MEDICINE

## 2020-11-07 PROCEDURE — 90935 HEMODIALYSIS ONE EVALUATION: CPT | Performed by: INTERNAL MEDICINE

## 2020-11-07 PROCEDURE — 5A1D70Z PERFORMANCE OF URINARY FILTRATION, INTERMITTENT, LESS THAN 6 HOURS PER DAY: ICD-10-PCS | Performed by: INTERNAL MEDICINE

## 2020-11-07 PROCEDURE — 99232 SBSQ HOSP IP/OBS MODERATE 35: CPT | Performed by: INTERNAL MEDICINE

## 2020-11-07 PROCEDURE — 84100 ASSAY OF PHOSPHORUS: CPT | Performed by: INTERNAL MEDICINE

## 2020-11-07 RX ORDER — CALCIUM CARBONATE 1250 MG/5ML
3000 SUSPENSION ORAL 4 TIMES DAILY
Status: DISCONTINUED | OUTPATIENT
Start: 2020-11-07 | End: 2020-11-08 | Stop reason: HOSPADM

## 2020-11-07 RX ORDER — CALCIUM CARBONATE 200(500)MG
3000 TABLET,CHEWABLE ORAL 4 TIMES DAILY
Status: DISCONTINUED | OUTPATIENT
Start: 2020-11-07 | End: 2020-11-07 | Stop reason: CLARIF

## 2020-11-07 RX ADMIN — CARVEDILOL 25 MG: 12.5 TABLET, FILM COATED ORAL at 11:46

## 2020-11-07 RX ADMIN — DOCUSATE SODIUM 100 MG: 100 CAPSULE, LIQUID FILLED ORAL at 17:44

## 2020-11-07 RX ADMIN — CALCIUM CARBONATE 3000 MG: 1250 SUSPENSION ORAL at 11:48

## 2020-11-07 RX ADMIN — CALCIUM GLUCONATE 4 G: 98 INJECTION, SOLUTION INTRAVENOUS at 14:50

## 2020-11-07 RX ADMIN — EPOETIN ALFA 10000 UNITS: 10000 SOLUTION INTRAVENOUS; SUBCUTANEOUS at 09:35

## 2020-11-07 RX ADMIN — CALCIUM CARBONATE 3000 MG: 1250 SUSPENSION ORAL at 17:45

## 2020-11-07 RX ADMIN — NIFEDIPINE 90 MG: 30 TABLET, EXTENDED RELEASE ORAL at 11:46

## 2020-11-07 RX ADMIN — DOCUSATE SODIUM 100 MG: 100 CAPSULE, LIQUID FILLED ORAL at 11:47

## 2020-11-07 RX ADMIN — HYDRALAZINE HYDROCHLORIDE 75 MG: 25 TABLET ORAL at 21:35

## 2020-11-07 RX ADMIN — HYDRALAZINE HYDROCHLORIDE 75 MG: 25 TABLET ORAL at 14:40

## 2020-11-07 RX ADMIN — HEPARIN SODIUM 5000 UNITS: 5000 INJECTION INTRAVENOUS; SUBCUTANEOUS at 14:40

## 2020-11-07 RX ADMIN — CALCIUM CARBONATE 3000 MG: 1250 SUSPENSION ORAL at 21:34

## 2020-11-07 RX ADMIN — CARVEDILOL 25 MG: 12.5 TABLET, FILM COATED ORAL at 17:43

## 2020-11-07 RX ADMIN — COLLAGENASE SANTYL: 250 OINTMENT TOPICAL at 11:46

## 2020-11-07 RX ADMIN — HYDRALAZINE HYDROCHLORIDE 75 MG: 25 TABLET ORAL at 06:01

## 2020-11-07 RX ADMIN — CALCITRIOL CAPSULES 0.25 MCG 1 MCG: 0.25 CAPSULE ORAL at 11:45

## 2020-11-08 VITALS
WEIGHT: 224.21 LBS | TEMPERATURE: 97.9 F | SYSTOLIC BLOOD PRESSURE: 174 MMHG | HEART RATE: 84 BPM | DIASTOLIC BLOOD PRESSURE: 85 MMHG | RESPIRATION RATE: 16 BRPM | BODY MASS INDEX: 32.1 KG/M2 | HEIGHT: 70 IN | OXYGEN SATURATION: 99 %

## 2020-11-08 LAB
ALBUMIN SERPL BCP-MCNC: 3.1 G/DL (ref 3.5–5)
ALP SERPL-CCNC: 494 U/L (ref 46–116)
ALT SERPL W P-5'-P-CCNC: 23 U/L (ref 12–78)
ANION GAP SERPL CALCULATED.3IONS-SCNC: 10 MMOL/L (ref 4–13)
AST SERPL W P-5'-P-CCNC: 12 U/L (ref 5–45)
BILIRUB SERPL-MCNC: 0.3 MG/DL (ref 0.2–1)
BUN SERPL-MCNC: 37 MG/DL (ref 5–25)
CA-I BLD-SCNC: 0.89 MMOL/L (ref 1.12–1.32)
CALCIUM ALBUM COR SERPL-MCNC: 7.9 MG/DL (ref 8.3–10.1)
CALCIUM SERPL-MCNC: 7.2 MG/DL (ref 8.3–10.1)
CHLORIDE SERPL-SCNC: 99 MMOL/L (ref 100–108)
CO2 SERPL-SCNC: 31 MMOL/L (ref 21–32)
CREAT SERPL-MCNC: 10.18 MG/DL (ref 0.6–1.3)
ERYTHROCYTE [DISTWIDTH] IN BLOOD BY AUTOMATED COUNT: 16.2 % (ref 11.6–15.1)
GFR SERPL CREATININE-BSD FRML MDRD: 6 ML/MIN/1.73SQ M
GLUCOSE SERPL-MCNC: 96 MG/DL (ref 65–140)
HCT VFR BLD AUTO: 27.7 % (ref 36.5–49.3)
HGB BLD-MCNC: 8.3 G/DL (ref 12–17)
MCH RBC QN AUTO: 26.9 PG (ref 26.8–34.3)
MCHC RBC AUTO-ENTMCNC: 30 G/DL (ref 31.4–37.4)
MCV RBC AUTO: 90 FL (ref 82–98)
PLATELET # BLD AUTO: 271 THOUSANDS/UL (ref 149–390)
PMV BLD AUTO: 9.2 FL (ref 8.9–12.7)
POTASSIUM SERPL-SCNC: 4.7 MMOL/L (ref 3.5–5.3)
PROT SERPL-MCNC: 7.7 G/DL (ref 6.4–8.2)
RBC # BLD AUTO: 3.09 MILLION/UL (ref 3.88–5.62)
SODIUM SERPL-SCNC: 140 MMOL/L (ref 136–145)
WBC # BLD AUTO: 6.73 THOUSAND/UL (ref 4.31–10.16)

## 2020-11-08 PROCEDURE — 82330 ASSAY OF CALCIUM: CPT | Performed by: INTERNAL MEDICINE

## 2020-11-08 PROCEDURE — 11042 DBRDMT SUBQ TIS 1ST 20SQCM/<: CPT | Performed by: STUDENT IN AN ORGANIZED HEALTH CARE EDUCATION/TRAINING PROGRAM

## 2020-11-08 PROCEDURE — 99239 HOSP IP/OBS DSCHRG MGMT >30: CPT | Performed by: INTERNAL MEDICINE

## 2020-11-08 PROCEDURE — 80053 COMPREHEN METABOLIC PANEL: CPT | Performed by: INTERNAL MEDICINE

## 2020-11-08 PROCEDURE — 0JBH0ZZ EXCISION OF LEFT LOWER ARM SUBCUTANEOUS TISSUE AND FASCIA, OPEN APPROACH: ICD-10-PCS | Performed by: STUDENT IN AN ORGANIZED HEALTH CARE EDUCATION/TRAINING PROGRAM

## 2020-11-08 PROCEDURE — 85027 COMPLETE CBC AUTOMATED: CPT | Performed by: INTERNAL MEDICINE

## 2020-11-08 PROCEDURE — 99233 SBSQ HOSP IP/OBS HIGH 50: CPT | Performed by: INTERNAL MEDICINE

## 2020-11-08 RX ORDER — CALCIUM CARBONATE 200(500)MG
3000 TABLET,CHEWABLE ORAL 4 TIMES DAILY
Qty: 720 TABLET | Refills: 2 | Status: SHIPPED | OUTPATIENT
Start: 2020-11-08 | End: 2021-02-18 | Stop reason: HOSPADM

## 2020-11-08 RX ORDER — LIDOCAINE HYDROCHLORIDE 10 MG/ML
20 INJECTION, SOLUTION EPIDURAL; INFILTRATION; INTRACAUDAL; PERINEURAL ONCE
Status: COMPLETED | OUTPATIENT
Start: 2020-11-08 | End: 2020-11-08

## 2020-11-08 RX ADMIN — DOCUSATE SODIUM 100 MG: 100 CAPSULE, LIQUID FILLED ORAL at 08:15

## 2020-11-08 RX ADMIN — NIFEDIPINE 90 MG: 30 TABLET, EXTENDED RELEASE ORAL at 08:15

## 2020-11-08 RX ADMIN — ACETAMINOPHEN 650 MG: 325 TABLET, FILM COATED ORAL at 10:48

## 2020-11-08 RX ADMIN — CALCITRIOL CAPSULES 0.25 MCG 1 MCG: 0.25 CAPSULE ORAL at 08:14

## 2020-11-08 RX ADMIN — CARVEDILOL 25 MG: 12.5 TABLET, FILM COATED ORAL at 08:16

## 2020-11-08 RX ADMIN — HYDRALAZINE HYDROCHLORIDE 75 MG: 25 TABLET ORAL at 06:07

## 2020-11-08 RX ADMIN — COLLAGENASE SANTYL: 250 OINTMENT TOPICAL at 08:15

## 2020-11-08 RX ADMIN — LIDOCAINE HYDROCHLORIDE 20 ML: 10 INJECTION, SOLUTION EPIDURAL; INFILTRATION; INTRACAUDAL; PERINEURAL at 10:48

## 2020-11-08 RX ADMIN — CALCIUM CARBONATE 3000 MG: 1250 SUSPENSION ORAL at 08:16

## 2020-11-09 ENCOUNTER — HOSPITAL ENCOUNTER (OUTPATIENT)
Dept: INFUSION CENTER | Facility: CLINIC | Age: 45
Discharge: HOME | End: 2020-11-09
Payer: COMMERCIAL

## 2020-11-09 DIAGNOSIS — E83.51 HYPOCALCEMIA: Primary | ICD-10-CM

## 2020-11-09 LAB — CA-I BLD-SCNC: 0.89 MMOL/L (ref 1.12–1.32)

## 2020-11-09 PROCEDURE — 82330 ASSAY OF CALCIUM: CPT | Performed by: INTERNAL MEDICINE

## 2020-11-09 PROCEDURE — 96366 THER/PROPH/DIAG IV INF ADDON: CPT

## 2020-11-09 PROCEDURE — 96365 THER/PROPH/DIAG IV INF INIT: CPT

## 2020-11-09 RX ADMIN — CALCIUM GLUCONATE 3 G: 94 INJECTION, SOLUTION INTRAVENOUS at 13:04

## 2020-11-12 ENCOUNTER — PATIENT OUTREACH (OUTPATIENT)
Dept: CASE MANAGEMENT | Facility: HOSPITAL | Age: 45
End: 2020-11-12

## 2020-11-12 ENCOUNTER — HOSPITAL ENCOUNTER (OUTPATIENT)
Dept: INFUSION CENTER | Facility: CLINIC | Age: 45
Discharge: HOME | End: 2020-11-12
Payer: COMMERCIAL

## 2020-11-12 VITALS
HEART RATE: 89 BPM | DIASTOLIC BLOOD PRESSURE: 78 MMHG | TEMPERATURE: 98.2 F | SYSTOLIC BLOOD PRESSURE: 155 MMHG | RESPIRATION RATE: 18 BRPM

## 2020-11-12 DIAGNOSIS — Z78.9 NEED FOR FOLLOW-UP BY SOCIAL WORKER: Primary | ICD-10-CM

## 2020-11-12 DIAGNOSIS — E83.51 HYPOCALCEMIA: Primary | ICD-10-CM

## 2020-11-12 LAB — CA-I BLD-SCNC: 0.85 MMOL/L (ref 1.12–1.32)

## 2020-11-12 PROCEDURE — 96366 THER/PROPH/DIAG IV INF ADDON: CPT

## 2020-11-12 PROCEDURE — 82330 ASSAY OF CALCIUM: CPT | Performed by: INTERNAL MEDICINE

## 2020-11-12 PROCEDURE — 96365 THER/PROPH/DIAG IV INF INIT: CPT

## 2020-11-12 RX ADMIN — CALCIUM GLUCONATE 3 G: 98 INJECTION, SOLUTION INTRAVENOUS at 08:59

## 2020-11-13 ENCOUNTER — PATIENT OUTREACH (OUTPATIENT)
Dept: CASE MANAGEMENT | Facility: HOSPITAL | Age: 45
End: 2020-11-13

## 2020-11-16 ENCOUNTER — HOSPITAL ENCOUNTER (OUTPATIENT)
Dept: INFUSION CENTER | Facility: CLINIC | Age: 45
Discharge: HOME | End: 2020-11-16
Payer: COMMERCIAL

## 2020-11-16 VITALS
RESPIRATION RATE: 20 BRPM | HEART RATE: 88 BPM | DIASTOLIC BLOOD PRESSURE: 72 MMHG | SYSTOLIC BLOOD PRESSURE: 137 MMHG | TEMPERATURE: 97.6 F

## 2020-11-16 DIAGNOSIS — E83.51 HYPOCALCEMIA: Primary | ICD-10-CM

## 2020-11-16 LAB — CA-I BLD-SCNC: 0.85 MMOL/L (ref 1.12–1.32)

## 2020-11-16 PROCEDURE — 96365 THER/PROPH/DIAG IV INF INIT: CPT

## 2020-11-16 PROCEDURE — 82330 ASSAY OF CALCIUM: CPT | Performed by: INTERNAL MEDICINE

## 2020-11-16 PROCEDURE — 96366 THER/PROPH/DIAG IV INF ADDON: CPT

## 2020-11-16 RX ORDER — SODIUM CHLORIDE 9 MG/ML
20 INJECTION, SOLUTION INTRAVENOUS ONCE
Status: CANCELLED
Start: 2020-11-16

## 2020-11-16 RX ORDER — SODIUM CHLORIDE 9 MG/ML
20 INJECTION, SOLUTION INTRAVENOUS ONCE
Status: COMPLETED | OUTPATIENT
Start: 2020-11-16 | End: 2020-11-16

## 2020-11-16 RX ADMIN — CALCIUM GLUCONATE 3 G: 98 INJECTION, SOLUTION INTRAVENOUS at 09:49

## 2020-11-16 RX ADMIN — SODIUM CHLORIDE 20 ML/HR: 0.9 INJECTION, SOLUTION INTRAVENOUS at 09:48

## 2020-11-17 DIAGNOSIS — Z51.89 ENCOUNTER FOR WOUND CARE: Primary | ICD-10-CM

## 2020-11-18 ENCOUNTER — OFFICE VISIT (OUTPATIENT)
Dept: PLASTIC SURGERY | Facility: MEDICAL CENTER | Age: 45
End: 2020-11-18
Payer: COMMERCIAL

## 2020-11-18 VITALS — HEART RATE: 90 BPM | HEIGHT: 70 IN | WEIGHT: 222.4 LBS | BODY MASS INDEX: 31.84 KG/M2 | TEMPERATURE: 97.1 F

## 2020-11-18 DIAGNOSIS — S41.102D ARM WOUND, LEFT, SUBSEQUENT ENCOUNTER: Primary | ICD-10-CM

## 2020-11-18 PROCEDURE — 99214 OFFICE O/P EST MOD 30 MIN: CPT | Performed by: STUDENT IN AN ORGANIZED HEALTH CARE EDUCATION/TRAINING PROGRAM

## 2020-11-19 ENCOUNTER — HOSPITAL ENCOUNTER (OUTPATIENT)
Dept: INFUSION CENTER | Facility: CLINIC | Age: 45
Discharge: HOME | End: 2020-11-19
Payer: COMMERCIAL

## 2020-11-19 ENCOUNTER — PATIENT OUTREACH (OUTPATIENT)
Dept: CASE MANAGEMENT | Facility: HOSPITAL | Age: 45
End: 2020-11-19

## 2020-11-19 VITALS
TEMPERATURE: 97.8 F | RESPIRATION RATE: 18 BRPM | DIASTOLIC BLOOD PRESSURE: 64 MMHG | SYSTOLIC BLOOD PRESSURE: 123 MMHG | HEART RATE: 87 BPM

## 2020-11-19 DIAGNOSIS — E83.51 HYPOCALCEMIA: Primary | ICD-10-CM

## 2020-11-19 LAB — CA-I BLD-SCNC: 0.89 MMOL/L (ref 1.12–1.32)

## 2020-11-19 PROCEDURE — 96366 THER/PROPH/DIAG IV INF ADDON: CPT

## 2020-11-19 PROCEDURE — 96365 THER/PROPH/DIAG IV INF INIT: CPT

## 2020-11-19 PROCEDURE — 82330 ASSAY OF CALCIUM: CPT | Performed by: INTERNAL MEDICINE

## 2020-11-19 RX ORDER — SODIUM CHLORIDE 9 MG/ML
20 INJECTION, SOLUTION INTRAVENOUS ONCE
Status: CANCELLED
Start: 2020-11-19

## 2020-11-19 RX ORDER — SODIUM CHLORIDE 9 MG/ML
20 INJECTION, SOLUTION INTRAVENOUS ONCE
Status: COMPLETED | OUTPATIENT
Start: 2020-11-19 | End: 2020-11-19

## 2020-11-19 RX ADMIN — SODIUM CHLORIDE 20 ML/HR: 0.9 INJECTION, SOLUTION INTRAVENOUS at 09:38

## 2020-11-19 RX ADMIN — CALCIUM GLUCONATE 3 G: 94 INJECTION, SOLUTION INTRAVENOUS at 09:38

## 2020-11-23 ENCOUNTER — TELEPHONE (OUTPATIENT)
Dept: SURGICAL ONCOLOGY | Facility: CLINIC | Age: 45
End: 2020-11-23

## 2020-11-23 ENCOUNTER — HOSPITAL ENCOUNTER (OUTPATIENT)
Dept: INFUSION CENTER | Facility: CLINIC | Age: 45
Discharge: HOME | End: 2020-11-23
Payer: COMMERCIAL

## 2020-11-23 VITALS
SYSTOLIC BLOOD PRESSURE: 186 MMHG | TEMPERATURE: 97.7 F | HEART RATE: 86 BPM | DIASTOLIC BLOOD PRESSURE: 100 MMHG | RESPIRATION RATE: 18 BRPM

## 2020-11-23 DIAGNOSIS — E83.51 HYPOCALCEMIA: Primary | ICD-10-CM

## 2020-11-23 LAB — CA-I BLD-SCNC: 0.89 MMOL/L (ref 1.12–1.32)

## 2020-11-23 PROCEDURE — 96366 THER/PROPH/DIAG IV INF ADDON: CPT

## 2020-11-23 PROCEDURE — 82330 ASSAY OF CALCIUM: CPT | Performed by: INTERNAL MEDICINE

## 2020-11-23 PROCEDURE — 96365 THER/PROPH/DIAG IV INF INIT: CPT

## 2020-11-23 RX ORDER — SODIUM CHLORIDE 9 MG/ML
20 INJECTION, SOLUTION INTRAVENOUS ONCE
Status: COMPLETED | OUTPATIENT
Start: 2020-11-23 | End: 2020-11-23

## 2020-11-23 RX ORDER — SODIUM CHLORIDE 9 MG/ML
20 INJECTION, SOLUTION INTRAVENOUS ONCE
Status: CANCELLED
Start: 2020-11-23

## 2020-11-23 RX ADMIN — CALCIUM GLUCONATE 3 G: 98 INJECTION, SOLUTION INTRAVENOUS at 09:08

## 2020-11-23 RX ADMIN — SODIUM CHLORIDE 20 ML/HR: 0.9 INJECTION, SOLUTION INTRAVENOUS at 09:05

## 2020-11-25 ENCOUNTER — ANESTHESIA EVENT (OUTPATIENT)
Dept: PERIOP | Facility: HOSPITAL | Age: 45
End: 2020-11-25
Payer: COMMERCIAL

## 2020-11-27 ENCOUNTER — HOSPITAL ENCOUNTER (OUTPATIENT)
Dept: INFUSION CENTER | Facility: CLINIC | Age: 45
Discharge: HOME | End: 2020-11-27
Payer: COMMERCIAL

## 2020-11-27 VITALS
TEMPERATURE: 97.1 F | DIASTOLIC BLOOD PRESSURE: 85 MMHG | HEART RATE: 88 BPM | SYSTOLIC BLOOD PRESSURE: 151 MMHG | RESPIRATION RATE: 20 BRPM

## 2020-11-27 DIAGNOSIS — E83.51 HYPOCALCEMIA: Primary | ICD-10-CM

## 2020-11-27 LAB — CA-I BLD-SCNC: 0.9 MMOL/L (ref 1.12–1.32)

## 2020-11-27 PROCEDURE — 82330 ASSAY OF CALCIUM: CPT | Performed by: INTERNAL MEDICINE

## 2020-11-30 ENCOUNTER — HOSPITAL ENCOUNTER (OUTPATIENT)
Dept: INFUSION CENTER | Facility: CLINIC | Age: 45
Discharge: HOME | End: 2020-11-30
Payer: COMMERCIAL

## 2020-11-30 VITALS
DIASTOLIC BLOOD PRESSURE: 86 MMHG | HEART RATE: 85 BPM | SYSTOLIC BLOOD PRESSURE: 158 MMHG | RESPIRATION RATE: 20 BRPM | TEMPERATURE: 97.2 F

## 2020-11-30 DIAGNOSIS — E83.51 HYPOCALCEMIA: Primary | ICD-10-CM

## 2020-11-30 LAB — CA-I BLD-SCNC: 0.87 MMOL/L (ref 1.12–1.32)

## 2020-11-30 PROCEDURE — 96366 THER/PROPH/DIAG IV INF ADDON: CPT

## 2020-11-30 PROCEDURE — 96365 THER/PROPH/DIAG IV INF INIT: CPT

## 2020-11-30 PROCEDURE — 82330 ASSAY OF CALCIUM: CPT | Performed by: INTERNAL MEDICINE

## 2020-11-30 RX ORDER — SODIUM CHLORIDE 9 MG/ML
20 INJECTION, SOLUTION INTRAVENOUS ONCE
Status: CANCELLED
Start: 2020-11-30

## 2020-11-30 RX ORDER — SODIUM CHLORIDE 9 MG/ML
20 INJECTION, SOLUTION INTRAVENOUS ONCE
Status: COMPLETED | OUTPATIENT
Start: 2020-11-30 | End: 2020-11-30

## 2020-11-30 RX ADMIN — SODIUM CHLORIDE 20 ML/HR: 9 INJECTION, SOLUTION INTRAVENOUS at 09:12

## 2020-11-30 RX ADMIN — CALCIUM GLUCONATE 3 G: 98 INJECTION, SOLUTION INTRAVENOUS at 09:14

## 2020-12-02 ENCOUNTER — HOSPITAL ENCOUNTER (OUTPATIENT)
Facility: HOSPITAL | Age: 45
Setting detail: OUTPATIENT SURGERY
Discharge: HOME | End: 2020-12-02
Attending: STUDENT IN AN ORGANIZED HEALTH CARE EDUCATION/TRAINING PROGRAM | Admitting: STUDENT IN AN ORGANIZED HEALTH CARE EDUCATION/TRAINING PROGRAM
Payer: COMMERCIAL

## 2020-12-02 ENCOUNTER — ANESTHESIA (OUTPATIENT)
Dept: PERIOP | Facility: HOSPITAL | Age: 45
End: 2020-12-02
Payer: COMMERCIAL

## 2020-12-02 VITALS
OXYGEN SATURATION: 97 % | SYSTOLIC BLOOD PRESSURE: 162 MMHG | TEMPERATURE: 97.8 F | DIASTOLIC BLOOD PRESSURE: 88 MMHG | WEIGHT: 219 LBS | HEART RATE: 68 BPM | RESPIRATION RATE: 16 BRPM | HEIGHT: 70 IN | BODY MASS INDEX: 31.35 KG/M2

## 2020-12-02 VITALS — HEART RATE: 82 BPM

## 2020-12-02 DIAGNOSIS — S41.102D WOUND OF LEFT UPPER EXTREMITY, SUBSEQUENT ENCOUNTER: Primary | ICD-10-CM

## 2020-12-02 PROCEDURE — 15002 WOUND PREP TRK/ARM/LEG: CPT | Performed by: STUDENT IN AN ORGANIZED HEALTH CARE EDUCATION/TRAINING PROGRAM

## 2020-12-02 PROCEDURE — 15100 SPLT AGRFT T/A/L 1ST 100SQCM: CPT | Performed by: STUDENT IN AN ORGANIZED HEALTH CARE EDUCATION/TRAINING PROGRAM

## 2020-12-02 PROCEDURE — 99024 POSTOP FOLLOW-UP VISIT: CPT | Performed by: STUDENT IN AN ORGANIZED HEALTH CARE EDUCATION/TRAINING PROGRAM

## 2020-12-02 RX ORDER — SODIUM CHLORIDE 9 MG/ML
20 INJECTION, SOLUTION INTRAVENOUS CONTINUOUS
Status: DISCONTINUED | OUTPATIENT
Start: 2020-12-02 | End: 2020-12-02 | Stop reason: HOSPADM

## 2020-12-02 RX ORDER — MINERAL OIL
OIL (ML) MISCELLANEOUS AS NEEDED
Status: DISCONTINUED | OUTPATIENT
Start: 2020-12-02 | End: 2020-12-02 | Stop reason: HOSPADM

## 2020-12-02 RX ORDER — FENTANYL CITRATE 50 UG/ML
INJECTION, SOLUTION INTRAMUSCULAR; INTRAVENOUS AS NEEDED
Status: DISCONTINUED | OUTPATIENT
Start: 2020-12-02 | End: 2020-12-02

## 2020-12-02 RX ORDER — CEFAZOLIN SODIUM 1 G/3ML
INJECTION, POWDER, FOR SOLUTION INTRAMUSCULAR; INTRAVENOUS AS NEEDED
Status: DISCONTINUED | OUTPATIENT
Start: 2020-12-02 | End: 2020-12-02

## 2020-12-02 RX ORDER — NEOSTIGMINE METHYLSULFATE 1 MG/ML
INJECTION INTRAVENOUS AS NEEDED
Status: DISCONTINUED | OUTPATIENT
Start: 2020-12-02 | End: 2020-12-02

## 2020-12-02 RX ORDER — ROCURONIUM BROMIDE 10 MG/ML
INJECTION, SOLUTION INTRAVENOUS AS NEEDED
Status: DISCONTINUED | OUTPATIENT
Start: 2020-12-02 | End: 2020-12-02

## 2020-12-02 RX ORDER — OXYCODONE HYDROCHLORIDE AND ACETAMINOPHEN 5; 325 MG/1; MG/1
1 TABLET ORAL EVERY 6 HOURS PRN
Qty: 10 TABLET | Refills: 0 | Status: SHIPPED | OUTPATIENT
Start: 2020-12-02 | End: 2020-12-05

## 2020-12-02 RX ORDER — FENTANYL CITRATE/PF 50 MCG/ML
25 SYRINGE (ML) INJECTION
Status: DISCONTINUED | OUTPATIENT
Start: 2020-12-02 | End: 2020-12-02 | Stop reason: HOSPADM

## 2020-12-02 RX ORDER — HYDRALAZINE HYDROCHLORIDE 20 MG/ML
5 INJECTION INTRAMUSCULAR; INTRAVENOUS
Status: DISCONTINUED | OUTPATIENT
Start: 2020-12-02 | End: 2020-12-02 | Stop reason: HOSPADM

## 2020-12-02 RX ORDER — GLYCOPYRROLATE 0.2 MG/ML
INJECTION INTRAMUSCULAR; INTRAVENOUS AS NEEDED
Status: DISCONTINUED | OUTPATIENT
Start: 2020-12-02 | End: 2020-12-02

## 2020-12-02 RX ORDER — ONDANSETRON 2 MG/ML
INJECTION INTRAMUSCULAR; INTRAVENOUS AS NEEDED
Status: DISCONTINUED | OUTPATIENT
Start: 2020-12-02 | End: 2020-12-02

## 2020-12-02 RX ORDER — HYDRALAZINE HYDROCHLORIDE 20 MG/ML
INJECTION INTRAMUSCULAR; INTRAVENOUS AS NEEDED
Status: DISCONTINUED | OUTPATIENT
Start: 2020-12-02 | End: 2020-12-02

## 2020-12-02 RX ORDER — PROPOFOL 10 MG/ML
INJECTION, EMULSION INTRAVENOUS AS NEEDED
Status: DISCONTINUED | OUTPATIENT
Start: 2020-12-02 | End: 2020-12-02

## 2020-12-02 RX ORDER — SODIUM CHLORIDE 9 MG/ML
INJECTION, SOLUTION INTRAVENOUS CONTINUOUS PRN
Status: DISCONTINUED | OUTPATIENT
Start: 2020-12-02 | End: 2020-12-02

## 2020-12-02 RX ORDER — LIDOCAINE HYDROCHLORIDE 10 MG/ML
INJECTION, SOLUTION EPIDURAL; INFILTRATION; INTRACAUDAL; PERINEURAL AS NEEDED
Status: DISCONTINUED | OUTPATIENT
Start: 2020-12-02 | End: 2020-12-02

## 2020-12-02 RX ORDER — ONDANSETRON 2 MG/ML
4 INJECTION INTRAMUSCULAR; INTRAVENOUS ONCE AS NEEDED
Status: DISCONTINUED | OUTPATIENT
Start: 2020-12-02 | End: 2020-12-02 | Stop reason: HOSPADM

## 2020-12-02 RX ORDER — LIDOCAINE HYDROCHLORIDE AND EPINEPHRINE 10; 10 MG/ML; UG/ML
INJECTION, SOLUTION INFILTRATION; PERINEURAL AS NEEDED
Status: DISCONTINUED | OUTPATIENT
Start: 2020-12-02 | End: 2020-12-02 | Stop reason: HOSPADM

## 2020-12-02 RX ADMIN — PROPOFOL 200 MG: 10 INJECTION, EMULSION INTRAVENOUS at 13:27

## 2020-12-02 RX ADMIN — FENTANYL CITRATE 50 MCG: 50 INJECTION INTRAMUSCULAR; INTRAVENOUS at 13:30

## 2020-12-02 RX ADMIN — HYDRALAZINE HYDROCHLORIDE 5 MG: 20 INJECTION, SOLUTION INTRAMUSCULAR; INTRAVENOUS at 15:48

## 2020-12-02 RX ADMIN — HYDRALAZINE HYDROCHLORIDE 5 MG: 20 INJECTION INTRAMUSCULAR; INTRAVENOUS at 15:13

## 2020-12-02 RX ADMIN — CEFAZOLIN 2000 MG: 1 INJECTION, POWDER, FOR SOLUTION INTRAVENOUS at 14:07

## 2020-12-02 RX ADMIN — HYDRALAZINE HYDROCHLORIDE 5 MG: 20 INJECTION, SOLUTION INTRAMUSCULAR; INTRAVENOUS at 16:00

## 2020-12-02 RX ADMIN — SODIUM CHLORIDE: 0.9 INJECTION, SOLUTION INTRAVENOUS at 14:37

## 2020-12-02 RX ADMIN — Medication 25 MCG: at 15:52

## 2020-12-02 RX ADMIN — SODIUM CHLORIDE: 0.9 INJECTION, SOLUTION INTRAVENOUS at 13:20

## 2020-12-02 RX ADMIN — ONDANSETRON 4 MG: 2 INJECTION INTRAMUSCULAR; INTRAVENOUS at 13:27

## 2020-12-02 RX ADMIN — LIDOCAINE HYDROCHLORIDE 50 MG: 10 INJECTION, SOLUTION EPIDURAL; INFILTRATION; INTRACAUDAL; PERINEURAL at 13:27

## 2020-12-02 RX ADMIN — Medication 25 MCG: at 15:35

## 2020-12-02 RX ADMIN — HYDRALAZINE HYDROCHLORIDE 5 MG: 20 INJECTION, SOLUTION INTRAMUSCULAR; INTRAVENOUS at 16:14

## 2020-12-02 RX ADMIN — Medication 25 MCG: at 16:17

## 2020-12-02 RX ADMIN — FENTANYL CITRATE 50 MCG: 50 INJECTION INTRAMUSCULAR; INTRAVENOUS at 13:42

## 2020-12-02 RX ADMIN — ROCURONIUM BROMIDE 50 MG: 50 INJECTION, SOLUTION INTRAVENOUS at 13:51

## 2020-12-02 RX ADMIN — NEOSTIGMINE METHYLSULFATE 5 MG: 1 INJECTION INTRAVENOUS at 14:28

## 2020-12-02 RX ADMIN — GLYCOPYRROLATE 0.8 MG: 0.2 INJECTION, SOLUTION INTRAMUSCULAR; INTRAVENOUS at 14:28

## 2020-12-03 ENCOUNTER — HOSPITAL ENCOUNTER (OUTPATIENT)
Dept: INFUSION CENTER | Facility: CLINIC | Age: 45
Discharge: HOME | End: 2020-12-03
Payer: COMMERCIAL

## 2020-12-03 ENCOUNTER — PREP FOR PROCEDURE (OUTPATIENT)
Dept: INTERVENTIONAL RADIOLOGY/VASCULAR | Facility: CLINIC | Age: 45
End: 2020-12-03

## 2020-12-03 VITALS
RESPIRATION RATE: 18 BRPM | SYSTOLIC BLOOD PRESSURE: 168 MMHG | TEMPERATURE: 97.1 F | HEART RATE: 83 BPM | DIASTOLIC BLOOD PRESSURE: 70 MMHG

## 2020-12-03 DIAGNOSIS — N18.6 END-STAGE RENAL DISEASE ON HEMODIALYSIS (HCC): Primary | ICD-10-CM

## 2020-12-03 DIAGNOSIS — E83.51 HYPOCALCEMIA: Primary | ICD-10-CM

## 2020-12-03 DIAGNOSIS — N18.6 ESRD (END STAGE RENAL DISEASE) (HCC): Primary | ICD-10-CM

## 2020-12-03 DIAGNOSIS — Z99.2 END-STAGE RENAL DISEASE ON HEMODIALYSIS (HCC): Primary | ICD-10-CM

## 2020-12-03 LAB — CA-I BLD-SCNC: 0.9 MMOL/L (ref 1.12–1.32)

## 2020-12-03 PROCEDURE — 96365 THER/PROPH/DIAG IV INF INIT: CPT

## 2020-12-03 PROCEDURE — 96366 THER/PROPH/DIAG IV INF ADDON: CPT

## 2020-12-03 PROCEDURE — 82330 ASSAY OF CALCIUM: CPT | Performed by: INTERNAL MEDICINE

## 2020-12-03 RX ORDER — SODIUM CHLORIDE 9 MG/ML
20 INJECTION, SOLUTION INTRAVENOUS ONCE
Status: CANCELLED
Start: 2020-12-03

## 2020-12-03 RX ORDER — SODIUM CHLORIDE 9 MG/ML
20 INJECTION, SOLUTION INTRAVENOUS ONCE
Status: COMPLETED | OUTPATIENT
Start: 2020-12-03 | End: 2020-12-03

## 2020-12-03 RX ADMIN — SODIUM CHLORIDE 20 ML/HR: 0.9 INJECTION, SOLUTION INTRAVENOUS at 09:43

## 2020-12-03 RX ADMIN — CALCIUM GLUCONATE 3 G: 98 INJECTION, SOLUTION INTRAVENOUS at 09:44

## 2020-12-04 ENCOUNTER — TELEPHONE (OUTPATIENT)
Dept: INTERVENTIONAL RADIOLOGY/VASCULAR | Facility: HOSPITAL | Age: 45
End: 2020-12-04

## 2020-12-07 ENCOUNTER — HOSPITAL ENCOUNTER (OUTPATIENT)
Dept: INFUSION CENTER | Facility: CLINIC | Age: 45
End: 2020-12-07

## 2020-12-07 ENCOUNTER — HOSPITAL ENCOUNTER (OUTPATIENT)
Dept: INTERVENTIONAL RADIOLOGY/VASCULAR | Facility: HOSPITAL | Age: 45
Discharge: HOME | End: 2020-12-07
Attending: RADIOLOGY | Admitting: RADIOLOGY
Payer: COMMERCIAL

## 2020-12-07 DIAGNOSIS — N18.6 ESRD (END STAGE RENAL DISEASE) (HCC): ICD-10-CM

## 2020-12-09 ENCOUNTER — OFFICE VISIT (OUTPATIENT)
Dept: PLASTIC SURGERY | Facility: CLINIC | Age: 45
End: 2020-12-09

## 2020-12-09 VITALS — TEMPERATURE: 97.8 F

## 2020-12-09 DIAGNOSIS — Z98.890 POST-OPERATIVE STATE: Primary | ICD-10-CM

## 2020-12-09 PROCEDURE — 99024 POSTOP FOLLOW-UP VISIT: CPT

## 2020-12-15 ENCOUNTER — OFFICE VISIT (OUTPATIENT)
Dept: PLASTIC SURGERY | Facility: CLINIC | Age: 45
End: 2020-12-15

## 2020-12-15 DIAGNOSIS — E83.51 HYPOCALCEMIA: Primary | ICD-10-CM

## 2020-12-15 DIAGNOSIS — S41.102D OPEN ARM WOUND, LEFT, SUBSEQUENT ENCOUNTER: Primary | ICD-10-CM

## 2020-12-15 PROCEDURE — 99024 POSTOP FOLLOW-UP VISIT: CPT | Performed by: STUDENT IN AN ORGANIZED HEALTH CARE EDUCATION/TRAINING PROGRAM

## 2020-12-15 RX ORDER — BISMUTH TRIBROMOPH/PETROLATUM 5"X9"
1 BANDAGE TOPICAL DAILY
Qty: 3 EACH | Refills: 0 | Status: SHIPPED | OUTPATIENT
Start: 2020-12-15 | End: 2021-02-18 | Stop reason: HOSPADM

## 2020-12-15 RX ORDER — BACITRACIN ZINC AND POLYMYXIN B SULFATE 500; 1000 [USP'U]/G; [USP'U]/G
OINTMENT TOPICAL
Qty: 30 G | Refills: 0 | Status: SHIPPED | OUTPATIENT
Start: 2020-12-15 | End: 2021-02-18 | Stop reason: HOSPADM

## 2021-01-11 ENCOUNTER — HOSPITAL ENCOUNTER (EMERGENCY)
Facility: HOSPITAL | Age: 46
Discharge: HOME | End: 2021-01-11
Attending: EMERGENCY MEDICINE | Admitting: EMERGENCY MEDICINE
Payer: COMMERCIAL

## 2021-01-11 VITALS
TEMPERATURE: 98.2 F | BODY MASS INDEX: 31.5 KG/M2 | SYSTOLIC BLOOD PRESSURE: 145 MMHG | RESPIRATION RATE: 18 BRPM | HEART RATE: 82 BPM | OXYGEN SATURATION: 100 % | DIASTOLIC BLOOD PRESSURE: 85 MMHG | HEIGHT: 70 IN | WEIGHT: 220 LBS

## 2021-01-11 DIAGNOSIS — E87.5 HYPERKALEMIA: ICD-10-CM

## 2021-01-11 DIAGNOSIS — E83.51 HYPOCALCEMIA: Primary | ICD-10-CM

## 2021-01-11 DIAGNOSIS — N18.6 ESRD (END STAGE RENAL DISEASE) ON DIALYSIS (HCC): ICD-10-CM

## 2021-01-11 DIAGNOSIS — Z99.2 ESRD (END STAGE RENAL DISEASE) ON DIALYSIS (HCC): ICD-10-CM

## 2021-01-11 LAB
ALBUMIN SERPL BCP-MCNC: 3.7 G/DL (ref 3.5–5)
ALP SERPL-CCNC: 471 U/L (ref 46–116)
ALT SERPL W P-5'-P-CCNC: 14 U/L (ref 12–78)
ANION GAP SERPL CALCULATED.3IONS-SCNC: 15 MMOL/L (ref 4–13)
AST SERPL W P-5'-P-CCNC: 13 U/L (ref 5–45)
BILIRUB DIRECT SERPL-MCNC: 0.12 MG/DL (ref 0–0.2)
BILIRUB SERPL-MCNC: 0.3 MG/DL (ref 0.2–1)
BUN SERPL-MCNC: 78 MG/DL (ref 5–25)
CA-I BLD-SCNC: 0.72 MMOL/L (ref 1.12–1.32)
CALCIUM SERPL-MCNC: 6.1 MG/DL (ref 8.3–10.1)
CHLORIDE SERPL-SCNC: 94 MMOL/L (ref 100–108)
CO2 SERPL-SCNC: 28 MMOL/L (ref 21–32)
CREAT SERPL-MCNC: 13.71 MG/DL (ref 0.6–1.3)
GFR SERPL CREATININE-BSD FRML MDRD: 4 ML/MIN/1.73SQ M
GLUCOSE SERPL-MCNC: 132 MG/DL (ref 65–140)
MAGNESIUM SERPL-MCNC: 1.8 MG/DL (ref 1.6–2.6)
POTASSIUM SERPL-SCNC: 5.7 MMOL/L (ref 3.5–5.3)
PROT SERPL-MCNC: 9 G/DL (ref 6.4–8.2)
SODIUM SERPL-SCNC: 137 MMOL/L (ref 136–145)

## 2021-01-11 PROCEDURE — 36415 COLL VENOUS BLD VENIPUNCTURE: CPT | Performed by: EMERGENCY MEDICINE

## 2021-01-11 PROCEDURE — 82330 ASSAY OF CALCIUM: CPT | Performed by: EMERGENCY MEDICINE

## 2021-01-11 PROCEDURE — 80076 HEPATIC FUNCTION PANEL: CPT | Performed by: EMERGENCY MEDICINE

## 2021-01-11 PROCEDURE — 80048 BASIC METABOLIC PNL TOTAL CA: CPT | Performed by: EMERGENCY MEDICINE

## 2021-01-11 PROCEDURE — 99285 EMERGENCY DEPT VISIT HI MDM: CPT | Performed by: EMERGENCY MEDICINE

## 2021-01-11 PROCEDURE — 96365 THER/PROPH/DIAG IV INF INIT: CPT

## 2021-01-11 PROCEDURE — 93005 ELECTROCARDIOGRAM TRACING: CPT

## 2021-01-11 PROCEDURE — 99284 EMERGENCY DEPT VISIT MOD MDM: CPT

## 2021-01-11 PROCEDURE — 83735 ASSAY OF MAGNESIUM: CPT | Performed by: EMERGENCY MEDICINE

## 2021-01-11 RX ORDER — CALCIUM GLUCONATE 20 MG/ML
2 INJECTION, SOLUTION INTRAVENOUS ONCE
Status: COMPLETED | OUTPATIENT
Start: 2021-01-11 | End: 2021-01-11

## 2021-01-11 RX ADMIN — CALCIUM GLUCONATE 2 G: 20 INJECTION, SOLUTION INTRAVENOUS at 16:49

## 2021-01-11 NOTE — DISCHARGE INSTRUCTIONS
Make sure you are taking a total of 4 grams of Tums daily, and 3 micrograms of calcitriol daily (two tablets 3 times a day), as well as the weekly vitamin D  Either your doctor or your nephrologist should repeat your calcium levels by the end of the week  Your potassium was slightly elevated today, but the should be corrected by doing dialysis tonight  Return to the ER if you have recurrence of numbness, develop weakness or muscle twitching, or for any other concerns

## 2021-01-11 NOTE — ED PROVIDER NOTES
History  Chief Complaint   Patient presents with    Numbness     pt c/o numbness all over his body since last night  Pt states " i think my calcium is low"     HPI    Prior to Admission Medications   Prescriptions Last Dose Informant Patient Reported? Taking? Bismuth Tribromoph-Petrolatum (Xeroform Petrolat Gauze 5"x9") MISC   No No   Sig: Apply 1 each topically daily Cut small piece, apply bacitracin ointment to small piece and then apply dressing to left arm skin graft site  Can save remainder of xeroform to use  NIFEdipine (PROCARDIA XL) 90 mg 24 hr tablet  Self Yes No   Sig: Take 90 mg by mouth daily   bacitracin-polymyxin b (POLYSPORIN) ointment   No No   Sig: Apply to xeroform and then apply to skin graft on left arm   calcitriol (ROCALTROL) 0 5 MCG capsule  Self No No   Sig: Take 2 capsules (1 mcg total) by mouth daily   carvedilol (COREG) 25 mg tablet  Self No No   Sig: Take 1 tablet (25 mg total) by mouth 2 (two) times a day with meals   hydrALAZINE (APRESOLINE) 25 mg tablet  Self No No   Sig: Take 3 tablets (75 mg total) by mouth every 8 (eight) hours      Facility-Administered Medications: None       Past Medical History:   Diagnosis Date    Chronic kidney disease     Hypertension     Pressure injury of skin     Renal disorder     dialysis        Past Surgical History:   Procedure Laterality Date    IR AV FISTULAGRAM/GRAFTOGRAM  1/18/2019    IR TUNNELED CENTRAL LINE PLACEMENT  10/29/2020    ND EXPLORE PARATHYROID GLANDS N/A 10/22/2020    Procedure:  Three gland PARATHYROIDECTOMY, four gland exploration, intraoperative PTH monitoring;  Surgeon: Erika Harper MD;  [facility]: [facility];  Service: Surgical Oncology    SPLIT THICKNESS SKIN GRAFT Left 12/2/2020    Procedure: SKIN GRAFT SPLIT THICKNESS (STSG)  LEFT ARM;  Surgeon: Bryan Garrido MD;  Location: [facility];  Service: Plastics    WOUND DEBRIDEMENT Left 12/2/2020    Procedure: AHOHKNZKWXU OF LEFT ARM WOUND;  Surgeon: Bryan Garrido, MD;  Location: [facility];  Service: Plastics       History reviewed  No pertinent family history  I have reviewed and agree with the history as documented  E-Cigarette/Vaping    E-Cigarette Use Never User      E-Cigarette/Vaping Substances    Nicotine No     THC No     CBD No     Flavoring No     Other No     Unknown No      Social History     Tobacco Use    Smoking status: Former Smoker     Quit date: 6/15/2010     Years since quitting: 10 5    Smokeless tobacco: Never Used   Substance Use Topics    Alcohol use: Not Currently     Frequency: Never    Drug use: Never       Review of Systems    Physical Exam  Physical Exam  Vitals signs and nursing note reviewed  Constitutional:       General: He is not in acute distress  Appearance: He is well-developed  HENT:      Head: Normocephalic and atraumatic  Eyes:      Conjunctiva/sclera: Conjunctivae normal       Pupils: Pupils are equal, round, and reactive to light  Neck:      Musculoskeletal: Normal range of motion  Trachea: No tracheal deviation  Cardiovascular:      Rate and Rhythm: Normal rate and regular rhythm  Heart sounds: Normal heart sounds  Pulmonary:      Effort: Pulmonary effort is normal  No respiratory distress  Breath sounds: Normal breath sounds  Abdominal:      General: There is no distension  Palpations: Abdomen is soft  Tenderness: There is no abdominal tenderness  Musculoskeletal:        Arms:    Skin:     General: Skin is warm and dry  Neurological:      Mental Status: He is alert and oriented to person, place, and time  GCS: GCS eye subscore is 4  GCS verbal subscore is 5  GCS motor subscore is 6  Cranial Nerves: Cranial nerves are intact  Sensory: Sensation is intact  No sensory deficit  Motor: Motor function is intact        Comments: No Chvostek sign   Psychiatric:         Behavior: Behavior normal          Vital Signs  ED Triage Vitals [01/11/21 1251]   Temperature Pulse Respirations Blood Pressure SpO2   98 2 °F (36 8 °C) 88 18 149/95 100 %      Temp Source Heart Rate Source Patient Position - Orthostatic VS BP Location FiO2 (%)   Oral Monitor Sitting Left arm --      Pain Score       No Pain           Vitals:    01/11/21 1251 01/11/21 1610   BP: 149/95 145/85   Pulse: 88 82   Patient Position - Orthostatic VS: Sitting Lying         Visual Acuity  Visual Acuity      Most Recent Value   L Pupil Size (mm)  3   R Pupil Size (mm)  3          ED Medications  Medications   calcium gluconate 2 g in sodium chloride 0 9% 100 mL (premix) (0 g Intravenous Stopped 1/11/21 1757)       Diagnostic Studies  Results Reviewed     Procedure Component Value Units Date/Time    Basic metabolic panel [[id number]]  (Abnormal) Collected: 01/11/21 1603    Lab Status: Final result Specimen: Blood from Arm, Left Updated: 01/11/21 1625     Sodium 137 mmol/L      Potassium 5 7 mmol/L      Chloride 94 mmol/L      CO2 28 mmol/L      ANION GAP 15 mmol/L      BUN 78 mg/dL      Creatinine 13 71 mg/dL      Glucose 132 mg/dL      Calcium 6 1 mg/dL      eGFR 4 ml/min/1 73sq m     Narrative:      [place] guidelines for Chronic Kidney Disease (CKD):     Stage 1 with normal or high GFR (GFR > 90 mL/min/1 73 square meters)    Stage 2 Mild CKD (GFR = 60-89 mL/min/1 73 square meters)    Stage 3A Moderate CKD (GFR = 45-59 mL/min/1 73 square meters)    Stage 3B Moderate CKD (GFR = 30-44 mL/min/1 73 square meters)    Stage 4 Severe CKD (GFR = 15-29 mL/min/1 73 square meters)    Stage 5 End Stage CKD (GFR <15 mL/min/1 73 square meters)  Note: GFR calculation is accurate only with a steady state creatinine    Hepatic function panel [[id number]]  (Abnormal) Collected: 01/11/21 1603    Lab Status: Final result Specimen: Blood from Arm, Left Updated: 01/11/21 1625     Total Bilirubin 0 30 mg/dL      Bilirubin, Direct 0 12 mg/dL      Alkaline Phosphatase 471 U/L      AST 13 U/L      ALT 14 U/L Total Protein 9 0 g/dL      Albumin 3 7 g/dL     Magnesium [[id number]]  (Normal) Collected: 01/11/21 1603    Lab Status: Final result Specimen: Blood from Arm, Left Updated: 01/11/21 1625     Magnesium 1 8 mg/dL     Calcium, ionized [[id number]]  (Abnormal) Collected: 01/11/21 1603    Lab Status: Final result Specimen: Blood from Arm, Left Updated: 01/11/21 1609     Calcium, Ionized 0 72 mmol/L                  No orders to display              Procedures  ECG 12 Lead Documentation Only    Date/Time: 1/11/2021 3:39 PM  Performed by: Dante Jacob MD  Authorized by: Dante Jacob MD     Indications / Diagnosis:  Diffuse numbness, concern for hypocalcemia  ECG reviewed by me, the ED Provider: yes    Patient location:  ED  Previous ECG:     Previous ECG:  Compared to current    Comparison ECG info:  11/06/20    Similarity:  No change  Interpretation:     Interpretation: non-specific    Rate:     ECG rate:  89    ECG rate assessment: normal    Rhythm:     Rhythm: sinus rhythm    Ectopy:     Ectopy: none    QRS:     QRS axis:  Normal    QRS intervals:  Normal  Conduction:     Conduction: normal    ST segments:     ST segments:  Normal  T waves:     T waves: inverted      Inverted:  III  Other findings:     Other findings: prolonged qTc interval               ED Course                             SBIRT 22yo+      Most Recent Value   SBIRT (24 yo +)   In order to provide better care to our patients, we are screening all of our patients for alcohol and drug use  Would it be okay to ask you these screening questions? Yes Filed at: 01/11/2021 1608   Initial Alcohol Screen: US AUDIT-C    1  How often do you have a drink containing alcohol?  0 Filed at: 01/11/2021 1608   2  How many drinks containing alcohol do you have on a typical day you are drinking? 0 Filed at: 01/11/2021 1608   3a  Male UNDER 65: How often do you have five or more drinks on one occasion?   0 Filed at: 01/11/2021 1608 3b  FEMALE Any Age, or MALE 65+: How often do you have 4 or more drinks on one occassion? 0 Filed at: 01/11/2021 1608   Audit-C Score  0 Filed at: 01/11/2021 1608   GABY: How many times in the past year have you    Used an illegal drug or used a prescription medication for non-medical reasons? Never Filed at: 01/11/2021 1608                    MDM  Number of Diagnoses or Management Options  ESRD (end stage renal disease) on dialysis [facility]): new and requires workup  Hyperkalemia: new and requires workup  Hypocalcemia: new and requires workup  Diagnosis management comments: This is a 42-year-old male who presents here today with a chief complaint full body numbness  He states this started yesterday before bed  It persisted throughout the day, but seemed to stop just prior to him arriving had the hospital   He states symptoms are similar to when he had low calcium before  He denies any associated weakness, muscle twitching or spasms  He does note several recent episodes of diarrhea, about three to 4 times a day over the past several days  He says his potassium has been high before due to ESRD  He denies known problems with his magnesium in the past   He says there is a period of time where he was getting daily infusions of calcium and was told that his numbers were looking better, so he has just been taking oral calcium  He says he is next due to have his lab work checked sometime later this month  He had 3 of his parathyroids removed at the end of October and has had issues with hypocalcemia since then  He does dialysis nightly at home, and denies any issues from this  He denies any persistent symptoms  His has no fevers, URI symptoms, nausea, vomiting, abdominal pain, other complaints  He denies any complaints currently and states his numbness has completely resolved  Review of systems:  Otherwise negative unless stated as above    He is well-appearing, in no acute distress  Exam is unremarkable  Though he is symptom free at this time, with history of hypocalcemia on the symptoms he was complaining of we will recheck his electrolytes today, to include ionized calcium and magnesium  He was not having any associated weakness, no focal symptoms to suggest TIA or CVA  The patient's calcium low  His potassium is mildly elevated  Labs are otherwise unremarkable  I did discuss the patient with Dr Marky mitchell from Nephrology  He states that as the patient remained symptom-free he gets 2 grams of IV calcium gluconate here, make sure he is taking for grams of Tums, 3 micrograms of calcitriol daily, and 98182 units of vitamin-D weekly the patient was given the medications without issues, and remained symptom-free  He states he has not been taking the Tums recently  He will be discharged home  He was advised to take medications as prescribed, and follow up for repeat blood work in several days  I discussed him indications for return to the ER, and he expresses understanding with this plan         Amount and/or Complexity of Data Reviewed  Clinical lab tests: ordered and reviewed  Decide to obtain previous medical records or to obtain history from someone other than the patient: yes  Review and summarize past medical records: yes  Discuss the patient with other providers: yes  Independent visualization of images, tracings, or specimens: yes        Disposition  Final diagnoses:   Hypocalcemia   Hyperkalemia   ESRD (end stage renal disease) on dialysis [facility])     Time reflects when diagnosis was documented in both MDM as applicable and the Disposition within this note     Time User Action Codes Description Comment    1/11/2021  6:05 PM Moni Alston Add [E83 51] Hypocalcemia     1/11/2021  6:05 PM Moni Alston Add [E87 5] Hyperkalemia     1/11/2021  6:05 PM Moni Alston Add [N18 6,  Z99 2] ESRD (end stage renal disease) on dialysis [facility])       ED Disposition     ED Disposition Condition Date/Time Comment    Discharge Carolann Arzola Jan 11, 2021  6:04 PM Trenton Headings discharge to home/self care  Follow-up Information     Follow up With Specialties Details Why [address] [facility],  Family Medicine Call  for repeat bloodwork in 3 days [address]      Ritesh Stevens MD Nephrology Call  for repeat blood work in 3 days [address] [facility]  [phone]            Patient's Medications   Discharge Prescriptions    No medications on file     No discharge procedures on file      PDMP Review       Value Time User    PDMP Reviewed  Yes 10/29/2020 10:39 AM Zohra Crystal O&#39;CRISTIANE Vang          ED Provider  Electronically Signed by           Sylvester Cullen MD  01/11/21 2037 No

## 2021-01-12 LAB
ATRIAL RATE: 89 BPM
P AXIS: 56 DEGREES
PR INTERVAL: 162 MS
QRS AXIS: 0 DEGREES
QRSD INTERVAL: 82 MS
QT INTERVAL: 404 MS
QTC INTERVAL: 491 MS
T WAVE AXIS: 15 DEGREES
VENTRICULAR RATE: 89 BPM

## 2021-01-12 PROCEDURE — 93010 ELECTROCARDIOGRAM REPORT: CPT | Performed by: INTERNAL MEDICINE

## 2021-01-26 ENCOUNTER — HOSPITAL ENCOUNTER (EMERGENCY)
Facility: HOSPITAL | Age: 46
Discharge: HOME | End: 2021-01-26
Attending: EMERGENCY MEDICINE | Admitting: EMERGENCY MEDICINE
Payer: COMMERCIAL

## 2021-01-26 ENCOUNTER — APPOINTMENT (EMERGENCY)
Dept: CT IMAGING | Facility: HOSPITAL | Age: 46
End: 2021-01-26
Payer: COMMERCIAL

## 2021-01-26 VITALS
WEIGHT: 220.02 LBS | DIASTOLIC BLOOD PRESSURE: 79 MMHG | OXYGEN SATURATION: 98 % | HEART RATE: 100 BPM | SYSTOLIC BLOOD PRESSURE: 138 MMHG | TEMPERATURE: 98.1 F | RESPIRATION RATE: 16 BRPM | BODY MASS INDEX: 31.57 KG/M2

## 2021-01-26 DIAGNOSIS — M62.838 TRAPEZIUS MUSCLE SPASM: Primary | ICD-10-CM

## 2021-01-26 PROCEDURE — 99284 EMERGENCY DEPT VISIT MOD MDM: CPT | Performed by: PHYSICIAN ASSISTANT

## 2021-01-26 PROCEDURE — 72125 CT NECK SPINE W/O DYE: CPT

## 2021-01-26 PROCEDURE — 72128 CT CHEST SPINE W/O DYE: CPT

## 2021-01-26 PROCEDURE — 99283 EMERGENCY DEPT VISIT LOW MDM: CPT

## 2021-01-26 RX ORDER — DIAZEPAM 5 MG/1
5 TABLET ORAL ONCE
Status: COMPLETED | OUTPATIENT
Start: 2021-01-26 | End: 2021-01-26

## 2021-01-26 RX ORDER — ACETAMINOPHEN 325 MG/1
975 TABLET ORAL ONCE
Status: COMPLETED | OUTPATIENT
Start: 2021-01-26 | End: 2021-01-26

## 2021-01-26 RX ORDER — DIAZEPAM 5 MG/1
5 TABLET ORAL EVERY 8 HOURS PRN
Qty: 12 TABLET | Refills: 0 | Status: SHIPPED | OUTPATIENT
Start: 2021-01-26

## 2021-01-26 RX ADMIN — ACETAMINOPHEN 975 MG: 325 TABLET, FILM COATED ORAL at 17:11

## 2021-01-26 RX ADMIN — DIAZEPAM 5 MG: 5 TABLET ORAL at 17:10

## 2021-01-26 NOTE — ED PROVIDER NOTES
History  Chief Complaint   Patient presents with    Neck Pain     Pt with c/o neck pain since this morning, worse when he lays down  No injury or trauma      [address] yo male with upper back pain  Onset this morning after waking up  Reports it feeling stiff  Worse when he tries to turn his neck  States he thought he slept on it wrong  Not getting better throughout the day  Has never had this before  Denies numbness, tingling, weakness in extremities  No chest pain or sob  No headache  No fever  Reports h/o hypocalecmia but current symptoms not typical of when his calcium drops  History provided by:  Patient   used: No    Back Pain  Location:  Thoracic spine  Quality:  Cramping  Radiates to: neck  Pain severity:  Moderate  Pain is:  Same all the time  Onset quality:  Sudden  Duration:  10 hours  Timing:  Constant  Progression:  Unchanged  Chronicity:  New  Context: not emotional stress, not falling, not jumping from heights, not lifting heavy objects, not MCA, not MVA, not occupational injury, not pedestrian accident, not physical stress, not recent illness, not recent injury and not twisting    Relieved by:  Nothing  Worsened by: Movement  Ineffective treatments:  None tried  Associated symptoms: no abdominal pain, no chest pain, no dysuria, no fever, no headaches, no numbness and no weakness        Prior to Admission Medications   Prescriptions Last Dose Informant Patient Reported? Taking? Bismuth Tribromoph-Petrolatum (Xeroform Petrolat Gauze 5"x9") MISC Not Taking at Unknown time  No No   Sig: Apply 1 each topically daily Cut small piece, apply bacitracin ointment to small piece and then apply dressing to left arm skin graft site  Can save remainder of xeroform to use     Patient not taking: Reported on 1/26/2021   NIFEdipine (PROCARDIA XL) 90 mg 24 hr tablet Not Taking at Unknown time Self Yes No   Sig: Take 90 mg by mouth daily   bacitracin-polymyxin b (POLYSPORIN) ointment Not Taking at Unknown time  No No   Sig: Apply to xeroform and then apply to skin graft on left arm   Patient not taking: Reported on 1/26/2021   calcitriol (ROCALTROL) 0 5 MCG capsule  Self No Yes   Sig: Take 2 capsules (1 mcg total) by mouth daily   carvedilol (COREG) 25 mg tablet  Self No No   Sig: Take 1 tablet (25 mg total) by mouth 2 (two) times a day with meals   hydrALAZINE (APRESOLINE) 25 mg tablet  Self No No   Sig: Take 3 tablets (75 mg total) by mouth every 8 (eight) hours      Facility-Administered Medications: None       Past Medical History:   Diagnosis Date    Chronic kidney disease     Hypertension     Pressure injury of skin     Renal disorder     dialysis        Past Surgical History:   Procedure Laterality Date    IR AV FISTULAGRAM/GRAFTOGRAM  1/18/2019    IR TUNNELED CENTRAL LINE PLACEMENT  10/29/2020    WV EXPLORE PARATHYROID GLANDS N/A 10/22/2020    Procedure: Three gland PARATHYROIDECTOMY, four gland exploration, intraoperative PTH monitoring;  Surgeon: Mira Beck MD;  Location: [facility];  Service: Surgical Oncology    SPLIT THICKNESS SKIN GRAFT Left 12/2/2020    Procedure: SKIN GRAFT SPLIT THICKNESS (STSG)  LEFT ARM;  Surgeon: Brock Mena MD;  Location: [facility];  Service: Plastics    WOUND DEBRIDEMENT Left 12/2/2020    Procedure: DEBRIDEMENT OF LEFT ARM WOUND;  Surgeon: Brock Mena MD;  Location: [facility];  Service: Plastics       History reviewed  No pertinent family history  I have reviewed and agree with the history as documented      E-Cigarette/Vaping    E-Cigarette Use Never User      E-Cigarette/Vaping Substances    Nicotine No     THC No     CBD No     Flavoring No     Other No     Unknown No      Social History     Tobacco Use    Smoking status: Former Smoker     Quit date: 6/15/2010     Years since quitting: 10 6    Smokeless tobacco: Never Used   Substance Use Topics    Alcohol use: Not Currently     Frequency: Never    Drug use: Never       Review of Systems   Constitutional: Negative for activity change, appetite change, chills, diaphoresis, fatigue, fever and unexpected weight change  HENT: Negative for congestion, rhinorrhea, sinus pressure, sore throat and trouble swallowing  Eyes: Negative for photophobia and visual disturbance  Respiratory: Negative for apnea, cough, choking, chest tightness, shortness of breath, wheezing and stridor  Cardiovascular: Negative for chest pain, palpitations and leg swelling  Gastrointestinal: Negative for abdominal distention, abdominal pain, blood in stool, constipation, diarrhea, nausea and vomiting  Genitourinary: Negative for decreased urine volume, difficulty urinating, dysuria, enuresis, flank pain, frequency, hematuria and urgency  Musculoskeletal: Positive for back pain and neck pain  Negative for arthralgias, myalgias and neck stiffness  Skin: Negative for color change, pallor, rash and wound  Allergic/Immunologic: Negative  Neurological: Negative for dizziness, tremors, syncope, weakness, light-headedness, numbness and headaches  Hematological: Negative  Psychiatric/Behavioral: Negative  All other systems reviewed and are negative  Physical Exam  Physical Exam  Vitals signs and nursing note reviewed  Constitutional:       General: He is not in acute distress  Appearance: Normal appearance  He is well-developed  He is not ill-appearing, toxic-appearing or diaphoretic  HENT:      Head: Normocephalic and atraumatic  Eyes:      General: Lids are normal       Pupils: Pupils are equal, round, and reactive to light  Neck:      Musculoskeletal: Normal range of motion and neck supple  Cardiovascular:      Rate and Rhythm: Normal rate and regular rhythm  Pulses: Normal pulses  No decreased pulses  Radial pulses are 2+ on the right side and 2+ on the left side  Heart sounds: Normal heart sounds, S1 normal and S2 normal  Heart sounds not distant  No murmur  No friction rub  No gallop  Pulmonary:      Effort: Pulmonary effort is normal  No tachypnea, bradypnea, accessory muscle usage or respiratory distress  Breath sounds: Normal breath sounds  No decreased breath sounds, wheezing, rhonchi or rales  Abdominal:      General: There is no distension  Palpations: Abdomen is soft  Abdomen is not rigid  Tenderness: There is no abdominal tenderness  There is no guarding or rebound  Musculoskeletal: Normal range of motion  General: No deformity  Cervical back: He exhibits tenderness, pain and spasm  He exhibits normal range of motion, no bony tenderness, no swelling, no edema, no deformity and no laceration  Thoracic back: He exhibits tenderness and bony tenderness  He exhibits normal range of motion, no swelling and no edema  Back:       Comments: Point tenderness near T2-T3 of thoracic vertebrae  Paraspinal muscle tenderness along cervical spine  No step offs or crepitus  Muscle spasm is present as well  Skin:     General: Skin is warm and dry  Coloration: Skin is not pale  Findings: No erythema or rash  Neurological:      Mental Status: He is alert and oriented to person, place, and time  GCS: GCS eye subscore is 4  GCS verbal subscore is 5  GCS motor subscore is 6  Cranial Nerves: No cranial nerve deficit  Comments: GCS 15  AAOx3  Ambulating in department without difficulty  CN II-XII grossly intact  No focal neuro deficits  Normal upper extremity strength b/l      Psychiatric:         Speech: Speech normal          Vital Signs  ED Triage Vitals   Temperature Pulse Respirations Blood Pressure SpO2   01/26/21 1657 01/26/21 1657 01/26/21 1657 01/26/21 1700 01/26/21 1657   98 1 °F (36 7 °C) (!) 118 15 139/100 99 %      Temp src Heart Rate Source Patient Position - Orthostatic VS BP Location FiO2 (%)   -- -- -- -- --             Pain Score       01/26/21 1711       Worst Possible Pain           Vitals: 01/26/21 1657 01/26/21 1700   BP:  139/100   Pulse: (!) 118          Visual Acuity      ED Medications  Medications   diazepam (VALIUM) tablet 5 mg (5 mg Oral Given 1/26/21 1710)   acetaminophen (TYLENOL) tablet 975 mg (975 mg Oral Given 1/26/21 1711)       Diagnostic Studies  Results Reviewed     None                 CT spine cervical without contrast   Final Result by Melinda Rivero MD (01/26 1801)         1  Mild disc degenerative change without evidence of disc herniation, significant central canal stenosis or neural foraminal narrowing  2   Diffusely sclerotic bones likely secondary to renal osteodystrophy  Workstation performed: [id number]         CT spine thoracic without contrast   Final Result by Corine Wylie DO (01/26 1812)      Bones are diffusely dense in keeping with history of end-stage renal disease  No fracture identified  Workstation performed: [id number]                    Procedures  Procedures         ED Course  ED Course as of Jan 26 1828   Tue Jan 26, 2021 1823 Pt feels better and would like to go home  SBIRT 22yo+      Most Recent Value   SBIRT (22 yo +)   In order to provide better care to our patients, we are screening all of our patients for alcohol and drug use  Would it be okay to ask you these screening questions? Yes Filed at: 01/26/2021 1826   Initial Alcohol Screen: US AUDIT-C    1  How often do you have a drink containing alcohol?  0 Filed at: 01/26/2021 1826   2  How many drinks containing alcohol do you have on a typical day you are drinking? 0 Filed at: 01/26/2021 1826   3a  Male UNDER 65: How often do you have five or more drinks on one occasion? 0 Filed at: 01/26/2021 1826   3b  FEMALE Any Age, or MALE 65+: How often do you have 4 or more drinks on one occassion? 0 Filed at: 01/26/2021 1826   Audit-C Score  0 Filed at: 01/26/2021 1826   GABY: How many times in the past year have you    Used an illegal drug or used a prescription medication for non-medical reasons? Never Filed at: 01/26/2021 1826                    MDM  Number of Diagnoses or Management Options  Trapezius muscle spasm: new and requires workup  Diagnosis management comments: DDx including but not limited to: strain, sprain, arthritis, spinal stenosis, torticollis, herniated disc, radiculopathy; doubt epidural abscess or fracture or meningitis or carotid dissection  Plan: CT cervical spine and thoracic spine  Valium for muscle relaxation  Offered to check electrolytes but pt would like to defer, states he does not think it is related  I agree, low suspicion for electrolyte abnormalities  Amount and/or Complexity of Data Reviewed  Tests in the radiology section of CPT®: ordered and reviewed    Risk of Complications, Morbidity, and/or Mortality  Presenting problems: moderate  Management options: low  General comments: 38 yo with upper back and neck pain  Considered but doubt acs/mi  CT scan without acute abnormalities  After valium for muscle relaxation he feels better  Suspect muscle spasm  He has a ride home  He will f/u with PCP and return if symptoms worsen  Return parameters provided  Pt understands and agrees with plan  Patient Progress  Patient progress: stable      Disposition  Final diagnoses:   Trapezius muscle spasm     Time reflects when diagnosis was documented in both MDM as applicable and the Disposition within this note     Time User Action Codes Description Comment    1/26/2021  6:24 PM Augusto oHlman Add [R23 [id number]] Trapezius muscle spasm       ED Disposition     ED Disposition Condition Date/Time Comment    Discharge Stable Tue Jan 26, 2021  6:24 PM Destin Ramos discharge to home/self care              Follow-up Information     Follow up With Specialties Details Why [address] [place] [address], DO Family Medicine Call in 1 day  [address]  [facility] [address] [facility] [address]  [phone] Patient's Medications   Discharge Prescriptions    DIAZEPAM (VALIUM) 5 MG TABLET    Take 1 tablet (5 mg total) by mouth every 8 (eight) hours as needed for muscle spasms       Start Date: 1/26/2021 End Date: --       Order Dose: 5 mg       Quantity: 12 tablet    Refills: 0     No discharge procedures on file      PDMP Review       Value Time User    PDMP Reviewed  Yes 10/29/2020 10:39 AM Marizol Vergara O&#39;CRISTIANE Vang          ED Provider  Electronically Signed by           Larry Carroll PA-C  01/26/21 [id number]

## 2021-01-26 NOTE — ED NOTES
Pt called daughter to pick him up   She is waiting outside in the parking lot     [facility]  01/26/21 [id number]

## 2021-01-27 ENCOUNTER — APPOINTMENT (EMERGENCY)
Dept: CT IMAGING | Facility: HOSPITAL | Age: 46
End: 2021-01-27
Payer: COMMERCIAL

## 2021-01-27 ENCOUNTER — HOSPITAL ENCOUNTER (EMERGENCY)
Facility: HOSPITAL | Age: 46
Discharge: HOME | End: 2021-01-27
Attending: EMERGENCY MEDICINE | Admitting: EMERGENCY MEDICINE
Payer: COMMERCIAL

## 2021-01-27 ENCOUNTER — APPOINTMENT (EMERGENCY)
Dept: RADIOLOGY | Facility: HOSPITAL | Age: 46
End: 2021-01-27
Payer: COMMERCIAL

## 2021-01-27 VITALS
DIASTOLIC BLOOD PRESSURE: 104 MMHG | HEART RATE: 110 BPM | OXYGEN SATURATION: 100 % | SYSTOLIC BLOOD PRESSURE: 167 MMHG | RESPIRATION RATE: 18 BRPM | TEMPERATURE: 97.8 F

## 2021-01-27 DIAGNOSIS — E87.5 HYPERKALEMIA: ICD-10-CM

## 2021-01-27 DIAGNOSIS — M54.2 NECK PAIN: Primary | ICD-10-CM

## 2021-01-27 LAB
ALBUMIN SERPL BCP-MCNC: 4.1 G/DL (ref 3.5–5)
ALP SERPL-CCNC: 512 U/L (ref 46–116)
ALT SERPL W P-5'-P-CCNC: 24 U/L (ref 12–78)
ANION GAP SERPL CALCULATED.3IONS-SCNC: 18 MMOL/L (ref 4–13)
AST SERPL W P-5'-P-CCNC: 17 U/L (ref 5–45)
ATRIAL RATE: 106 BPM
BASOPHILS # BLD AUTO: 0.03 THOUSANDS/ΜL (ref 0–0.1)
BASOPHILS NFR BLD AUTO: 0 % (ref 0–1)
BILIRUB SERPL-MCNC: 0.4 MG/DL (ref 0.2–1)
BUN SERPL-MCNC: 84 MG/DL (ref 5–25)
CALCIUM SERPL-MCNC: 7.5 MG/DL (ref 8.3–10.1)
CHLORIDE SERPL-SCNC: 94 MMOL/L (ref 100–108)
CO2 SERPL-SCNC: 22 MMOL/L (ref 21–32)
CREAT SERPL-MCNC: 14.13 MG/DL (ref 0.6–1.3)
EOSINOPHIL # BLD AUTO: 0.17 THOUSAND/ΜL (ref 0–0.61)
EOSINOPHIL NFR BLD AUTO: 2 % (ref 0–6)
ERYTHROCYTE [DISTWIDTH] IN BLOOD BY AUTOMATED COUNT: 17.5 % (ref 11.6–15.1)
GFR SERPL CREATININE-BSD FRML MDRD: 4 ML/MIN/1.73SQ M
GLUCOSE SERPL-MCNC: 94 MG/DL (ref 65–140)
HCT VFR BLD AUTO: 40.5 % (ref 36.5–49.3)
HGB BLD-MCNC: 12.4 G/DL (ref 12–17)
IMM GRANULOCYTES # BLD AUTO: 0.05 THOUSAND/UL (ref 0–0.2)
IMM GRANULOCYTES NFR BLD AUTO: 1 % (ref 0–2)
LYMPHOCYTES # BLD AUTO: 2.19 THOUSANDS/ΜL (ref 0.6–4.47)
LYMPHOCYTES NFR BLD AUTO: 26 % (ref 14–44)
MCH RBC QN AUTO: 26.6 PG (ref 26.8–34.3)
MCHC RBC AUTO-ENTMCNC: 30.6 G/DL (ref 31.4–37.4)
MCV RBC AUTO: 87 FL (ref 82–98)
MONOCYTES # BLD AUTO: 0.89 THOUSAND/ΜL (ref 0.17–1.22)
MONOCYTES NFR BLD AUTO: 10 % (ref 4–12)
NEUTROPHILS # BLD AUTO: 5.23 THOUSANDS/ΜL (ref 1.85–7.62)
NEUTS SEG NFR BLD AUTO: 61 % (ref 43–75)
NRBC BLD AUTO-RTO: 0 /100 WBCS
P AXIS: 76 DEGREES
PLATELET # BLD AUTO: 225 THOUSANDS/UL (ref 149–390)
PMV BLD AUTO: 11.2 FL (ref 8.9–12.7)
POTASSIUM SERPL-SCNC: 6.4 MMOL/L (ref 3.5–5.3)
PR INTERVAL: 160 MS
PROT SERPL-MCNC: 10 G/DL (ref 6.4–8.2)
QRS AXIS: -37 DEGREES
QRSD INTERVAL: 96 MS
QT INTERVAL: 356 MS
QTC INTERVAL: 472 MS
RBC # BLD AUTO: 4.66 MILLION/UL (ref 3.88–5.62)
SODIUM SERPL-SCNC: 134 MMOL/L (ref 136–145)
T WAVE AXIS: 67 DEGREES
TROPONIN I SERPL-MCNC: <0.02 NG/ML
VENTRICULAR RATE: 106 BPM
WBC # BLD AUTO: 8.56 THOUSAND/UL (ref 4.31–10.16)

## 2021-01-27 PROCEDURE — 99284 EMERGENCY DEPT VISIT MOD MDM: CPT

## 2021-01-27 PROCEDURE — 71046 X-RAY EXAM CHEST 2 VIEWS: CPT

## 2021-01-27 PROCEDURE — 36415 COLL VENOUS BLD VENIPUNCTURE: CPT | Performed by: EMERGENCY MEDICINE

## 2021-01-27 PROCEDURE — 70491 CT SOFT TISSUE NECK W/DYE: CPT

## 2021-01-27 PROCEDURE — 93005 ELECTROCARDIOGRAM TRACING: CPT

## 2021-01-27 PROCEDURE — 80053 COMPREHEN METABOLIC PANEL: CPT | Performed by: EMERGENCY MEDICINE

## 2021-01-27 PROCEDURE — G1004 CDSM NDSC: HCPCS

## 2021-01-27 PROCEDURE — 85025 COMPLETE CBC W/AUTO DIFF WBC: CPT | Performed by: EMERGENCY MEDICINE

## 2021-01-27 PROCEDURE — 93010 ELECTROCARDIOGRAM REPORT: CPT | Performed by: INTERNAL MEDICINE

## 2021-01-27 PROCEDURE — 96375 TX/PRO/DX INJ NEW DRUG ADDON: CPT

## 2021-01-27 PROCEDURE — 99285 EMERGENCY DEPT VISIT HI MDM: CPT | Performed by: EMERGENCY MEDICINE

## 2021-01-27 PROCEDURE — 96374 THER/PROPH/DIAG INJ IV PUSH: CPT

## 2021-01-27 PROCEDURE — 84484 ASSAY OF TROPONIN QUANT: CPT | Performed by: EMERGENCY MEDICINE

## 2021-01-27 RX ORDER — LIDOCAINE 50 MG/G
1 PATCH TOPICAL ONCE
Status: DISCONTINUED | OUTPATIENT
Start: 2021-01-27 | End: 2021-01-27 | Stop reason: HOSPADM

## 2021-01-27 RX ORDER — LIDOCAINE 50 MG/G
1 PATCH TOPICAL DAILY
Qty: 30 PATCH | Refills: 0 | Status: SHIPPED | OUTPATIENT
Start: 2021-01-27

## 2021-01-27 RX ORDER — MORPHINE SULFATE 4 MG/ML
4 INJECTION, SOLUTION INTRAMUSCULAR; INTRAVENOUS ONCE
Status: COMPLETED | OUTPATIENT
Start: 2021-01-27 | End: 2021-01-27

## 2021-01-27 RX ORDER — ACETAMINOPHEN 325 MG/1
975 TABLET ORAL ONCE
Status: COMPLETED | OUTPATIENT
Start: 2021-01-27 | End: 2021-01-27

## 2021-01-27 RX ORDER — CALCIUM GLUCONATE 20 MG/ML
1 INJECTION, SOLUTION INTRAVENOUS ONCE
Status: DISCONTINUED | OUTPATIENT
Start: 2021-01-27 | End: 2021-01-27

## 2021-01-27 RX ORDER — DIAZEPAM 5 MG/1
5 TABLET ORAL ONCE
Status: COMPLETED | OUTPATIENT
Start: 2021-01-27 | End: 2021-01-27

## 2021-01-27 RX ADMIN — CALCIUM GLUCONATE 1 G: 20 INJECTION, SOLUTION INTRAVENOUS at 19:48

## 2021-01-27 RX ADMIN — MORPHINE SULFATE 4 MG: 4 INJECTION INTRAVENOUS at 16:33

## 2021-01-27 RX ADMIN — ACETAMINOPHEN 975 MG: 325 TABLET, FILM COATED ORAL at 16:32

## 2021-01-27 RX ADMIN — DIAZEPAM 5 MG: 5 TABLET ORAL at 16:32

## 2021-01-27 RX ADMIN — LIDOCAINE 5% 1 PATCH: 700 PATCH TOPICAL at 16:32

## 2021-01-27 RX ADMIN — IOHEXOL 100 ML: 350 INJECTION, SOLUTION INTRAVENOUS at 18:34

## 2021-01-27 NOTE — ED PROVIDER NOTES
History  Chief Complaint   Patient presents with    Back Pain     reports he was seen here last night  Back pain and neck pain, all over his body pain     Patient is a 49-year-old male past medical history of CHF, end-stage renal disease on daily hemodialysis at home, hypertension, hyperparathyroidism, anemia presenting for back and neck pain  Patient states that he woke up yesterday morning with severe upper back and neck pain which has spread all over his body and he states is gradually worsening  He denies any injuries that he is aware of  Was seen in this ED yesterday and received CT scan which was negative and discharged with Valium which he states he has been taking, last dose prior to arrival with no relief  He also states that he has been taking Advil intermittently with no relief  He denies any chest pain, shortness breath, dizziness, fevers, numbness or tingling, weakness, constipation, numbness or tingling, bowel or bladder incontinence, urinary retention  He states that he is experiencing sharp pains with movement and is unable to significantly rotator bent his neck  Notes odynophagia but denies dysphagia  Prior to Admission Medications   Prescriptions Last Dose Informant Patient Reported? Taking? Bismuth Tribromoph-Petrolatum (Xeroform Petrolat Gauze 5"x9") MISC   No No   Sig: Apply 1 each topically daily Cut small piece, apply bacitracin ointment to small piece and then apply dressing to left arm skin graft site  Can save remainder of xeroform to use     Patient not taking: Reported on 1/26/2021   NIFEdipine (PROCARDIA XL) 90 mg 24 hr tablet  Self Yes No   Sig: Take 90 mg by mouth daily   bacitracin-polymyxin b (POLYSPORIN) ointment   No No   Sig: Apply to xeroform and then apply to skin graft on left arm   Patient not taking: Reported on 1/26/2021   calcitriol (ROCALTROL) 0 5 MCG capsule  Self No No   Sig: Take 2 capsules (1 mcg total) by mouth daily   carvedilol (COREG) 25 mg tablet  Self No No   Sig: Take 1 tablet (25 mg total) by mouth 2 (two) times a day with meals   diazepam (Valium) 5 mg tablet   No No   Sig: Take 1 tablet (5 mg total) by mouth every 8 (eight) hours as needed for muscle spasms   hydrALAZINE (APRESOLINE) 25 mg tablet  Self No No   Sig: Take 3 tablets (75 mg total) by mouth every 8 (eight) hours      Facility-Administered Medications: None       Past Medical History:   Diagnosis Date    Chronic kidney disease     Hypertension     Pressure injury of skin     Renal disorder     dialysis        Past Surgical History:   Procedure Laterality Date    IR AV FISTULAGRAM/GRAFTOGRAM  1/18/2019    IR TUNNELED CENTRAL LINE PLACEMENT  10/29/2020    IN EXPLORE PARATHYROID GLANDS N/A 10/22/2020    Procedure: Three gland PARATHYROIDECTOMY, four gland exploration, intraoperative PTH monitoring;  Surgeon: Gladys Gu MD;  Location: [facility];  Service: Surgical Oncology    SPLIT THICKNESS SKIN GRAFT Left 12/2/2020    Procedure: SKIN GRAFT SPLIT THICKNESS (STSG)  LEFT ARM;  Surgeon: Aga Reyna MD;  Location: [facility];  Service: Plastics    WOUND DEBRIDEMENT Left 12/2/2020    Procedure: DEBRIDEMENT OF LEFT ARM WOUND;  Surgeon: Aga Reyna MD;  Location: [facility];  Service: Plastics       History reviewed  No pertinent family history  I have reviewed and agree with the history as documented  E-Cigarette/Vaping    E-Cigarette Use Never User      E-Cigarette/Vaping Substances    Nicotine No     THC No     CBD No     Flavoring No     Other No     Unknown No      Social History     Tobacco Use    Smoking status: Former Smoker     Quit date: 6/15/2010     Years since quitting: 10 6    Smokeless tobacco: Never Used   Substance Use Topics    Alcohol use: Not Currently     Frequency: Never    Drug use: Never       Review of Systems   All other systems reviewed and are negative  Physical Exam  Physical Exam  Vitals signs reviewed     Constitutional: General: He is not in acute distress  Appearance: Normal appearance  He is not ill-appearing  HENT:      Mouth/Throat:      Mouth: Mucous membranes are moist       Pharynx: No oropharyngeal exudate or posterior oropharyngeal erythema  Eyes:      Extraocular Movements: Extraocular movements intact  Conjunctiva/sclera: Conjunctivae normal    Neck:      Musculoskeletal: Neck rigidity and muscular tenderness present  Comments: Posterior tenderness, limited range of motion in flexion, extension, rotation and side bending bilaterally  Cardiovascular:      Rate and Rhythm: Normal rate and regular rhythm  Heart sounds: Normal heart sounds  Pulmonary:      Effort: Pulmonary effort is normal       Breath sounds: Normal breath sounds  Abdominal:      General: Abdomen is flat  Palpations: Abdomen is soft  Tenderness: There is no abdominal tenderness  Musculoskeletal: Normal range of motion  General: No swelling  Skin:     General: Skin is warm and dry  Neurological:      General: No focal deficit present  Mental Status: He is alert  Sensory: No sensory deficit  Motor: No weakness        Gait: Gait normal    Psychiatric:         Mood and Affect: Mood normal          Vital Signs  ED Triage Vitals [01/27/21 1458]   Temperature Pulse Respirations Blood Pressure SpO2   97 8 °F (36 6 °C) (!) 110 18 (!) 167/104 100 %      Temp Source Heart Rate Source Patient Position - Orthostatic VS BP Location FiO2 (%)   Oral Monitor Standing Left arm --      Pain Score       8           Vitals:    01/27/21 1458   BP: (!) 167/104   Pulse: (!) 110   Patient Position - Orthostatic VS: Standing         Visual Acuity      ED Medications  Medications   lidocaine (LIDODERM) 5 % patch 1 patch (1 patch Topical Patch Removed 1/27/21 2005)   lidocaine (LIDODERM) 5 % patch 1 patch (has no administration in time range)   acetaminophen (TYLENOL) tablet 975 mg (975 mg Oral Given 1/27/21 1632) diazepam (VALIUM) tablet 5 mg (5 mg Oral Given 1/27/21 1632)   morphine (PF) 4 mg/mL injection 4 mg (4 mg Intravenous Given 1/27/21 1633)   iohexol (OMNIPAQUE) 350 MG/ML injection (SINGLE-DOSE) 100 mL (100 mL Intravenous Given 1/27/21 1834)       Diagnostic Studies  Results Reviewed     Procedure Component Value Units Date/Time    Comprehensive metabolic panel [[id number]]  (Abnormal) Collected: 01/27/21 1817    Lab Status: Final result Specimen: Blood from Arm, Left Updated: 01/27/21 1918     Sodium 134 mmol/L      Potassium 6 4 mmol/L      Chloride 94 mmol/L      CO2 22 mmol/L      ANION GAP 18 mmol/L      BUN 84 mg/dL      Creatinine 14 13 mg/dL      Glucose 94 mg/dL      Calcium 7 5 mg/dL      AST 17 U/L      ALT 24 U/L      Alkaline Phosphatase 512 U/L      Total Protein 10 0 g/dL      Albumin 4 1 g/dL      Total Bilirubin 0 40 mg/dL      eGFR 4 ml/min/1 73sq m     Narrative:      [facility] guidelines for Chronic Kidney Disease (CKD):     Stage 1 with normal or high GFR (GFR > 90 mL/min/1 73 square meters)    Stage 2 Mild CKD (GFR = 60-89 mL/min/1 73 square meters)    Stage 3A Moderate CKD (GFR = 45-59 mL/min/1 73 square meters)    Stage 3B Moderate CKD (GFR = 30-44 mL/min/1 73 square meters)    Stage 4 Severe CKD (GFR = 15-29 mL/min/1 73 square meters)    Stage 5 End Stage CKD (GFR <15 mL/min/1 73 square meters)  Note: GFR calculation is accurate only with a steady state creatinine    Troponin I [[id number]]  (Normal) Collected: 01/27/21 1632    Lab Status: Final result Specimen: Blood from Arm, Left Updated: 01/27/21 1659     Troponin I <0 02 ng/mL     CBC and differential [[id number]]  (Abnormal) Collected: 01/27/21 1632    Lab Status: Final result Specimen: Blood from Arm, Left Updated: 01/27/21 1639     WBC 8 56 Thousand/uL      RBC 4 66 Million/uL      Hemoglobin 12 4 g/dL      Hematocrit 40 5 %      MCV 87 fL      MCH 26 6 pg      MCHC 30 6 g/dL      RDW 17 5 %      MPV 11 2 fL      Platelets 425 Thousands/uL      nRBC 0 /100 WBCs      Neutrophils Relative 61 %      Immat GRANS % 1 %      Lymphocytes Relative 26 %      Monocytes Relative 10 %      Eosinophils Relative 2 %      Basophils Relative 0 %      Neutrophils Absolute 5 23 Thousands/µL      Immature Grans Absolute 0 05 Thousand/uL      Lymphocytes Absolute 2 19 Thousands/µL      Monocytes Absolute 0 89 Thousand/µL      Eosinophils Absolute 0 17 Thousand/µL      Basophils Absolute 0 03 Thousands/µL                  CT soft tissue neck with contrast   Final Result by Nik Waldron MD (01/27 1903)      Stable exam   No acute findings  Workstation performed: [id number]         XR chest 2 views   Final Result by Nehemiah Cheung DO (01/27 1733)      No acute cardiopulmonary disease  Workstation performed: [id number]                    Procedures  ECG 12 Lead Documentation Only    Date/Time: 1/27/2021 3:56 PM  Performed by: Paramjit Weir DO  Authorized by: Paramjit Weir DO     ECG reviewed by me, the ED Provider: yes    Patient location:  ED  Previous ECG:     Previous ECG:  Compared to current    Comparison ECG info:  New T-wave inversion in aVL             ED Course  ED Course as of Jan 27 2133   Wed Jan 27, 2021 1955 Patient with hyperkalemia without EKG changes with the exception new T-wave inversion in aVL, elevated creatinine, alkaline phosphatase  Patient does admit to not dialyzing today but states he last dialyzed yesterday  As he has home hemodialysis he is able to perform this after discharge  I have discussed this with Nephrology who states that patient may be discharged home if he will dialyze himself tonight  SBIRT 22yo+      Most Recent Value   SBIRT (24 yo +)   In order to provide better care to our patients, we are screening all of our patients for alcohol and drug use  Would it be okay to ask you these screening questions?   Yes Filed at: 01/27/2021 1522   Initial Alcohol Screen: US AUDIT-C    1  How often do you have a drink containing alcohol?  0 Filed at: 01/27/2021 1522   2  How many drinks containing alcohol do you have on a typical day you are drinking? 0 Filed at: 01/27/2021 1522   3a  Male UNDER 65: How often do you have five or more drinks on one occasion? 0 Filed at: 01/27/2021 1522   3b  FEMALE Any Age, or MALE 65+: How often do you have 4 or more drinks on one occassion? 0 Filed at: 01/27/2021 1522   Audit-C Score  0 Filed at: 01/27/2021 1522   GABY: How many times in the past year have you    Used an illegal drug or used a prescription medication for non-medical reasons? Never Filed at: 01/27/2021 1522                    MDM  Number of Diagnoses or Management Options  Hyperkalemia:   Neck pain:   Diagnosis management comments: Patient is a 51-year-old male past medical history of end-stage renal disease on hemodialysis daily at home, CHF with ICD, hyperparathyroidism, hypertension, anemia presenting with neck pain  Patient is well-appearing at bedside with stable vitals though with low-grade tachycardia but in no acute distress  He has significant stiffness to the neck with midline tenderness to palpation but no other significant physical exam findings  Patient had CT cervical spine yesterday which was unremarkable  In the setting of worsening stiffness with no injury, no tenderness to palpation over the sternocleidomastoid and bilateral stiffness will obtain CT soft tissue neck to assess for other etiologies, give pain control and obtain labs to rule out other causes  I have discussed with patient that if his workup is unremarkable it is likely that he is suffering severe spasm and I do not feel that any other medication other than what has already been prescribed will better control his pain and have advised PCP follow-up which he states that he can obtain this week        Disposition  Final diagnoses:   Neck pain Hyperkalemia     Time reflects when diagnosis was documented in both MDM as applicable and the Disposition within this note     Time User Action Codes Description Comment    1/27/2021  7:56 PM Samanta Stiles Add [M54 2] Neck pain     1/27/2021  7:56 PM Samanta Stiles Add [E87 5] Hyperkalemia       ED Disposition     ED Disposition Condition Date/Time Comment    Discharge Stable Wed Jan 27, 2021  7:56 PM Chari Cherry discharge to home/self care  Follow-up Information     Follow up With Specialties Details Why [address] [place] [address], DO Family Medicine In 1 week  [address] [place] [address] [address]  [phone]            Discharge Medication List as of 1/27/2021  7:57 PM      CONTINUE these medications which have NOT CHANGED    Details   bacitracin-polymyxin b (POLYSPORIN) ointment Apply to xeroform and then apply to skin graft on left arm, Normal      Bismuth Tribromoph-Petrolatum (Xeroform Petrolat Gauze 5"x9") MISC Apply 1 each topically daily Cut small piece, apply bacitracin ointment to small piece and then apply dressing to left arm skin graft site   Can save remainder of xeroform to use , Starting Tue 12/15/2020, Normal      calcitriol (ROCALTROL) 0 5 MCG capsule Take 2 capsules (1 mcg total) by mouth daily, Starting Fri 10/30/2020, Normal      carvedilol (COREG) 25 mg tablet Take 1 tablet (25 mg total) by mouth 2 (two) times a day with meals, Starting Mon 7/6/2020, Until Wed 11/25/2020, Normal      diazepam (Valium) 5 mg tablet Take 1 tablet (5 mg total) by mouth every 8 (eight) hours as needed for muscle spasms, Starting Tue 1/26/2021, Print      hydrALAZINE (APRESOLINE) 25 mg tablet Take 3 tablets (75 mg total) by mouth every 8 (eight) hours, Starting Mon 7/6/2020, Until Wed 11/25/2020, Normal      NIFEdipine (PROCARDIA XL) 90 mg 24 hr tablet Take 90 mg by mouth daily, Starting Tue 5/5/2020, Until Wed 5/5/2021, Historical Med           No discharge procedures on file     PDMP Review       Value Time User    PDMP Reviewed  Yes 10/29/2020 10:39 AM Isabelle JOY&#39;CRISTIANE Vang          ED Provider  Electronically Signed by           Priscilla Holloway DO  01/27/21 7774

## 2021-01-28 NOTE — DISCHARGE INSTRUCTIONS
Please dialyze yourself as soon as you return home and please return to the ED if you experience the following:      Chest pain  Difficulty breathing  Uncontrollable vomiting  Lightheadedness/passing out  Fevers

## 2021-02-10 DIAGNOSIS — E83.51 HYPOCALCEMIA: Primary | ICD-10-CM

## 2021-02-11 DIAGNOSIS — R76.11 PPD POSITIVE: Primary | ICD-10-CM

## 2021-02-14 ENCOUNTER — HOSPITAL ENCOUNTER (INPATIENT)
Facility: HOSPITAL | Age: 46
LOS: 3 days | Discharge: HOME HEALTH | DRG: 194 | End: 2021-02-18
Attending: EMERGENCY MEDICINE | Admitting: STUDENT IN AN ORGANIZED HEALTH CARE EDUCATION/TRAINING PROGRAM
Payer: COMMERCIAL

## 2021-02-14 ENCOUNTER — APPOINTMENT (EMERGENCY)
Dept: RADIOLOGY | Facility: HOSPITAL | Age: 46
DRG: 194 | End: 2021-02-14
Payer: COMMERCIAL

## 2021-02-14 ENCOUNTER — APPOINTMENT (EMERGENCY)
Dept: CT IMAGING | Facility: HOSPITAL | Age: 46
DRG: 194 | End: 2021-02-14
Payer: COMMERCIAL

## 2021-02-14 DIAGNOSIS — M25.511 RIGHT SHOULDER PAIN: Primary | ICD-10-CM

## 2021-02-14 DIAGNOSIS — I10 HYPERTENSION: ICD-10-CM

## 2021-02-14 DIAGNOSIS — I10 HIGH BLOOD PRESSURE: ICD-10-CM

## 2021-02-14 DIAGNOSIS — R53.1 GENERALIZED WEAKNESS: ICD-10-CM

## 2021-02-14 DIAGNOSIS — M25.519 SHOULDER PAIN: ICD-10-CM

## 2021-02-14 DIAGNOSIS — N18.6 ESRD (END STAGE RENAL DISEASE) (HCC): ICD-10-CM

## 2021-02-14 DIAGNOSIS — R94.31 PROLONGED Q-T INTERVAL ON ECG: ICD-10-CM

## 2021-02-14 DIAGNOSIS — E83.51 HYPOCALCEMIA: ICD-10-CM

## 2021-02-14 LAB
ANION GAP SERPL CALCULATED.3IONS-SCNC: 21 MMOL/L (ref 4–13)
ATRIAL RATE: 81 BPM
ATRIAL RATE: 83 BPM
ATRIAL RATE: 92 BPM
BASOPHILS # BLD AUTO: 0.01 THOUSANDS/ΜL (ref 0–0.1)
BASOPHILS NFR BLD AUTO: 0 % (ref 0–1)
BUN SERPL-MCNC: 113 MG/DL (ref 5–25)
CA-I BLD-SCNC: 0.74 MMOL/L (ref 1.12–1.32)
CALCIUM SERPL-MCNC: 6.6 MG/DL (ref 8.3–10.1)
CHLORIDE SERPL-SCNC: 94 MMOL/L (ref 100–108)
CO2 SERPL-SCNC: 22 MMOL/L (ref 21–32)
CREAT SERPL-MCNC: 17.86 MG/DL (ref 0.6–1.3)
EOSINOPHIL # BLD AUTO: 0.14 THOUSAND/ΜL (ref 0–0.61)
EOSINOPHIL NFR BLD AUTO: 3 % (ref 0–6)
ERYTHROCYTE [DISTWIDTH] IN BLOOD BY AUTOMATED COUNT: 18.2 % (ref 11.6–15.1)
GFR SERPL CREATININE-BSD FRML MDRD: 3 ML/MIN/1.73SQ M
GLUCOSE SERPL-MCNC: 114 MG/DL (ref 65–140)
HCT VFR BLD AUTO: 31.2 % (ref 36.5–49.3)
HGB BLD-MCNC: 9.7 G/DL (ref 12–17)
IMM GRANULOCYTES # BLD AUTO: 0.04 THOUSAND/UL (ref 0–0.2)
IMM GRANULOCYTES NFR BLD AUTO: 1 % (ref 0–2)
LYMPHOCYTES # BLD AUTO: 1.33 THOUSANDS/ΜL (ref 0.6–4.47)
LYMPHOCYTES NFR BLD AUTO: 29 % (ref 14–44)
MCH RBC QN AUTO: 25.9 PG (ref 26.8–34.3)
MCHC RBC AUTO-ENTMCNC: 31.1 G/DL (ref 31.4–37.4)
MCV RBC AUTO: 83 FL (ref 82–98)
MONOCYTES # BLD AUTO: 0.43 THOUSAND/ΜL (ref 0.17–1.22)
MONOCYTES NFR BLD AUTO: 9 % (ref 4–12)
NEUTROPHILS # BLD AUTO: 2.63 THOUSANDS/ΜL (ref 1.85–7.62)
NEUTS SEG NFR BLD AUTO: 58 % (ref 43–75)
NRBC BLD AUTO-RTO: 0 /100 WBCS
P AXIS: 62 DEGREES
P AXIS: 63 DEGREES
P AXIS: 69 DEGREES
PLATELET # BLD AUTO: 244 THOUSANDS/UL (ref 149–390)
PMV BLD AUTO: 10.5 FL (ref 8.9–12.7)
POTASSIUM SERPL-SCNC: 5.2 MMOL/L (ref 3.5–5.3)
PR INTERVAL: 166 MS
PR INTERVAL: 178 MS
PR INTERVAL: 182 MS
QRS AXIS: -25 DEGREES
QRS AXIS: -7 DEGREES
QRS AXIS: 2 DEGREES
QRSD INTERVAL: 100 MS
QRSD INTERVAL: 104 MS
QRSD INTERVAL: 98 MS
QT INTERVAL: 426 MS
QT INTERVAL: 442 MS
QT INTERVAL: 452 MS
QTC INTERVAL: 519 MS
QTC INTERVAL: 525 MS
QTC INTERVAL: 526 MS
RBC # BLD AUTO: 3.75 MILLION/UL (ref 3.88–5.62)
SODIUM SERPL-SCNC: 137 MMOL/L (ref 136–145)
T WAVE AXIS: 69 DEGREES
T WAVE AXIS: 71 DEGREES
T WAVE AXIS: 72 DEGREES
TROPONIN I SERPL-MCNC: 0.02 NG/ML
VENTRICULAR RATE: 81 BPM
VENTRICULAR RATE: 83 BPM
VENTRICULAR RATE: 92 BPM
WBC # BLD AUTO: 4.58 THOUSAND/UL (ref 4.31–10.16)

## 2021-02-14 PROCEDURE — 96365 THER/PROPH/DIAG IV INF INIT: CPT

## 2021-02-14 PROCEDURE — 71045 X-RAY EXAM CHEST 1 VIEW: CPT

## 2021-02-14 PROCEDURE — 70450 CT HEAD/BRAIN W/O DYE: CPT

## 2021-02-14 PROCEDURE — 85025 COMPLETE CBC W/AUTO DIFF WBC: CPT | Performed by: EMERGENCY MEDICINE

## 2021-02-14 PROCEDURE — 96376 TX/PRO/DX INJ SAME DRUG ADON: CPT

## 2021-02-14 PROCEDURE — 82330 ASSAY OF CALCIUM: CPT | Performed by: EMERGENCY MEDICINE

## 2021-02-14 PROCEDURE — 99285 EMERGENCY DEPT VISIT HI MDM: CPT | Performed by: EMERGENCY MEDICINE

## 2021-02-14 PROCEDURE — 96367 TX/PROPH/DG ADDL SEQ IV INF: CPT

## 2021-02-14 PROCEDURE — 99285 EMERGENCY DEPT VISIT HI MDM: CPT

## 2021-02-14 PROCEDURE — 84484 ASSAY OF TROPONIN QUANT: CPT | Performed by: EMERGENCY MEDICINE

## 2021-02-14 PROCEDURE — 36415 COLL VENOUS BLD VENIPUNCTURE: CPT | Performed by: EMERGENCY MEDICINE

## 2021-02-14 PROCEDURE — 80048 BASIC METABOLIC PNL TOTAL CA: CPT | Performed by: EMERGENCY MEDICINE

## 2021-02-14 PROCEDURE — 93005 ELECTROCARDIOGRAM TRACING: CPT

## 2021-02-14 PROCEDURE — 93010 ELECTROCARDIOGRAM REPORT: CPT | Performed by: INTERNAL MEDICINE

## 2021-02-14 PROCEDURE — 96375 TX/PRO/DX INJ NEW DRUG ADDON: CPT

## 2021-02-14 RX ORDER — METOPROLOL TARTRATE 5 MG/5ML
5 INJECTION INTRAVENOUS ONCE
Status: DISCONTINUED | OUTPATIENT
Start: 2021-02-14 | End: 2021-02-14

## 2021-02-14 RX ORDER — CARVEDILOL 12.5 MG/1
25 TABLET ORAL ONCE
Status: COMPLETED | OUTPATIENT
Start: 2021-02-14 | End: 2021-02-14

## 2021-02-14 RX ORDER — CALCIUM GLUCONATE 20 MG/ML
1 INJECTION, SOLUTION INTRAVENOUS ONCE
Status: COMPLETED | OUTPATIENT
Start: 2021-02-14 | End: 2021-02-14

## 2021-02-14 RX ORDER — LIDOCAINE HYDROCHLORIDE 20 MG/ML
10 INJECTION, SOLUTION EPIDURAL; INFILTRATION; INTRACAUDAL; PERINEURAL ONCE
Status: COMPLETED | OUTPATIENT
Start: 2021-02-14 | End: 2021-02-14

## 2021-02-14 RX ORDER — METOPROLOL TARTRATE 5 MG/5ML
5 INJECTION INTRAVENOUS ONCE
Status: COMPLETED | OUTPATIENT
Start: 2021-02-14 | End: 2021-02-14

## 2021-02-14 RX ORDER — CARVEDILOL 12.5 MG/1
25 TABLET ORAL 2 TIMES DAILY WITH MEALS
Status: DISCONTINUED | OUTPATIENT
Start: 2021-02-15 | End: 2021-02-14

## 2021-02-14 RX ORDER — LABETALOL 20 MG/4 ML (5 MG/ML) INTRAVENOUS SYRINGE
15 ONCE
Status: COMPLETED | OUTPATIENT
Start: 2021-02-14 | End: 2021-02-14

## 2021-02-14 RX ORDER — MAGNESIUM SULFATE HEPTAHYDRATE 40 MG/ML
2 INJECTION, SOLUTION INTRAVENOUS ONCE
Status: COMPLETED | OUTPATIENT
Start: 2021-02-14 | End: 2021-02-14

## 2021-02-14 RX ORDER — HYDRALAZINE HYDROCHLORIDE 20 MG/ML
5 INJECTION INTRAMUSCULAR; INTRAVENOUS ONCE
Status: DISCONTINUED | OUTPATIENT
Start: 2021-02-14 | End: 2021-02-14

## 2021-02-14 RX ORDER — NIFEDIPINE 30 MG/1
90 TABLET, FILM COATED, EXTENDED RELEASE ORAL ONCE
Status: COMPLETED | OUTPATIENT
Start: 2021-02-15 | End: 2021-02-15

## 2021-02-14 RX ORDER — HYDRALAZINE HYDROCHLORIDE 20 MG/ML
10 INJECTION INTRAMUSCULAR; INTRAVENOUS ONCE
Status: COMPLETED | OUTPATIENT
Start: 2021-02-14 | End: 2021-02-14

## 2021-02-14 RX ORDER — HYDRALAZINE HYDROCHLORIDE 25 MG/1
75 TABLET, FILM COATED ORAL ONCE
Status: COMPLETED | OUTPATIENT
Start: 2021-02-14 | End: 2021-02-14

## 2021-02-14 RX ADMIN — CARVEDILOL 25 MG: 12.5 TABLET, FILM COATED ORAL at 23:42

## 2021-02-14 RX ADMIN — CALCIUM GLUCONATE 1 G: 20 INJECTION, SOLUTION INTRAVENOUS at 16:02

## 2021-02-14 RX ADMIN — MAGNESIUM SULFATE HEPTAHYDRATE 2 G: 40 INJECTION, SOLUTION INTRAVENOUS at 15:13

## 2021-02-14 RX ADMIN — METOROPROLOL TARTRATE 5 MG: 5 INJECTION, SOLUTION INTRAVENOUS at 18:46

## 2021-02-14 RX ADMIN — LABETALOL 20 MG/4 ML (5 MG/ML) INTRAVENOUS SYRINGE 15 MG: at 21:42

## 2021-02-14 RX ADMIN — HYDRALAZINE HYDROCHLORIDE 10 MG: 20 INJECTION INTRAMUSCULAR; INTRAVENOUS at 18:15

## 2021-02-14 RX ADMIN — LIDOCAINE HYDROCHLORIDE 10 ML: 20 INJECTION, SOLUTION EPIDURAL; INFILTRATION; INTRACAUDAL; PERINEURAL at 15:11

## 2021-02-14 RX ADMIN — METOROPROLOL TARTRATE 5 MG: 5 INJECTION, SOLUTION INTRAVENOUS at 19:59

## 2021-02-14 RX ADMIN — CALCIUM GLUCONATE 1 G: 20 INJECTION, SOLUTION INTRAVENOUS at 17:27

## 2021-02-14 RX ADMIN — HYDRALAZINE HYDROCHLORIDE 75 MG: 25 TABLET, FILM COATED ORAL at 21:42

## 2021-02-14 NOTE — ED PROVIDER NOTES
Pt Name: Maksim Royal  MRN: [id number]  [place] 1975  Age/Sex: 39 y o  male  Date of evaluation: 2/14/2021  PCP: Brown Culver, [address] [place] [address]    Chief Complaint   Patient presents with    Weakness - Generalized     Patient present to ED with co generalized weakness that started approximately 1 month ago  Patient states "I have pain in my shoulder that goes down my arm into my chest "          HPI    39 y o  male presenting with generalized weakness over the past month  Patient states that he has a history of end-stage renal disease on dialysis, does not make any urine, also has problems with calcium  He states that he has felt weak all over the past month, worse over the last several days  In particular, he states that if he lays down he feels heavy, then has difficulty rising  He also complains of pain in the left shoulder  The pain is sharp, severe, in the top of the left shoulder, radiating down the arm, worse with moving the arm and better at rest   It seems to be worse after leaving the arm still for a long period of time  He denies fever, trauma, numbness, focal weakness, chest pain, shortness of breath, other symptoms  HPI      Past Medical and Surgical History    Past Medical History:   Diagnosis Date    Chronic kidney disease     Hypertension     Pressure injury of skin     Renal disorder     dialysis        Past Surgical History:   Procedure Laterality Date    IR AV FISTULAGRAM/GRAFTOGRAM  1/18/2019    IR TUNNELED CENTRAL LINE PLACEMENT  10/29/2020    MD EXPLORE PARATHYROID GLANDS N/A 10/22/2020    Procedure:  Three gland PARATHYROIDECTOMY, four gland exploration, intraoperative PTH monitoring;  Surgeon: Kevin Loya MD;  [facility]: [facility];  Service: Surgical Oncology    SPLIT THICKNESS SKIN GRAFT Left 12/2/2020    Procedure: SKIN GRAFT SPLIT THICKNESS (STSG)  LEFT ARM;  Surgeon: Stacie Dietrich MD;  Location: [facility];  Service: Plastics    WOUND DEBRIDEMENT Left 12/2/2020    Procedure: DEBRIDEMENT OF LEFT ARM WOUND;  Surgeon: Bryan Garrido MD;  Location: [facility];  Service: Plastics       History reviewed  No pertinent family history  Social History     Tobacco Use    Smoking status: Former Smoker     Quit date: 6/15/2010     Years since quitting: 10 6    Smokeless tobacco: Never Used   Substance Use Topics    Alcohol use: Not Currently     Frequency: Never    Drug use: Never           Allergies    No Known Allergies    Home Medications    Prior to Admission medications    Medication Sig Start Date End Date Taking? Authorizing Provider   bacitracin-polymyxin b (POLYSPORIN) ointment Apply to xeroform and then apply to skin graft on left arm  Patient not taking: Reported on 1/26/2021 12/15/20 12/15/21  Bryan Garrido MD   Bismuth Tribromoph-Petrolatum (Xeroform Petrolat Gauze 5"x9") MISC Apply 1 each topically daily Cut small piece, apply bacitracin ointment to small piece and then apply dressing to left arm skin graft site  Can save remainder of xeroform to use    Patient not taking: Reported on 1/26/2021 12/15/20   Bryan Garrido MD   calcitriol (ROCALTROL) 0 5 MCG capsule Take 2 capsules (1 mcg total) by mouth daily 10/30/20   Eloise Morelos PA-C   carvedilol (COREG) 25 mg tablet Take 1 tablet (25 mg total) by mouth 2 (two) times a day with meals 7/6/20 11/25/20  Annie Garcia MD   diazepam (Valium) 5 mg tablet Take 1 tablet (5 mg total) by mouth every 8 (eight) hours as needed for muscle spasms 1/26/21   Earlene Chaudhry PA-C   hydrALAZINE (APRESOLINE) 25 mg tablet Take 3 tablets (75 mg total) by mouth every 8 (eight) hours 7/6/20 11/25/20  Annie Garcia MD   lidocaine (LIDODERM) 5 % Apply 1 patch topically daily Remove & Discard patch within 12 hours or as directed by MD 1/27/21   Thiago Curtis DO   NIFEdipine (PROCARDIA XL) 90 mg 24 hr tablet Take 90 mg by mouth daily 5/5/20 5/5/21  Historical Provider, MD           Review of Systems    Review of Systems   Constitutional: Positive for fatigue  Negative for appetite change, chills and diaphoresis  HENT: Negative for drooling, facial swelling, trouble swallowing and voice change  Respiratory: Negative for apnea, shortness of breath and wheezing  Cardiovascular: Negative for chest pain and leg swelling  Gastrointestinal: Negative for abdominal distention, abdominal pain, diarrhea, nausea and vomiting  Genitourinary: Negative for dysuria and urgency  Musculoskeletal: Positive for arthralgias and myalgias  Negative for back pain, gait problem and neck pain  Skin: Negative for color change, rash and wound  Neurological: Negative for seizures, speech difficulty, weakness and headaches  Psychiatric/Behavioral: Negative for agitation, behavioral problems and dysphoric mood  The patient is not nervous/anxious  All other systems reviewed and negative  Physical Exam      ED Triage Vitals [02/14/21 1435]   Temperature Pulse Respirations Blood Pressure SpO2   98 4 °F (36 9 °C) 90 18 (!) 222/124 98 %      Temp Source Heart Rate Source Patient Position - Orthostatic VS BP Location FiO2 (%)   Oral Monitor Lying Left arm --      Pain Score       Worst Possible Pain               Physical Exam  Vitals signs and nursing note reviewed  Constitutional:       Appearance: He is well-developed  HENT:      Head: Normocephalic and atraumatic  Eyes:      Conjunctiva/sclera: Conjunctivae normal       Pupils: Pupils are equal, round, and reactive to light  Neck:      Musculoskeletal: Normal range of motion and neck supple  Trachea: No tracheal deviation  Cardiovascular:      Rate and Rhythm: Normal rate and regular rhythm  Heart sounds: Normal heart sounds  No murmur  Pulmonary:      Effort: Pulmonary effort is normal  No respiratory distress  Breath sounds: Normal breath sounds  No stridor  No wheezing or rales     Abdominal:      General: There is no distension  Palpations: Abdomen is soft  Tenderness: There is no abdominal tenderness  There is no guarding or rebound  Musculoskeletal: Normal range of motion  General: Swelling and tenderness present  No deformity  Comments: Visibly and palpably spasmed rhomboid muscle on the left side  Tender to palpation  Strength sensation pulse and cap refill intact in both arms  Fistula in place in right forearm, palpable thrill  Skin:     General: Skin is warm and dry  Findings: No rash  Neurological:      Mental Status: He is alert and oriented to person, place, and time  Psychiatric:         Behavior: Behavior normal          Thought Content: Thought content normal          Judgment: Judgment normal               Diagnostic Results  EKG Interpretation 1436    Rate:  92  BPM  Rhythm:  Normal Sinus Rhythm   Axis:  Normal   Intervals: Normal, no blocks, QTc  526 ms  Q waves:  Q-waves in septal leads   T waves:  Normal   ST segments:  No significant elevations or depressions     Impression:  Normal sinus rhythm with prolonged QTC in old-appearing septal infarct but without without evidence of acute ischemia or significant arrhythmia      EKG for comparison:  EKG dated 27 January 2021 showed sinus tachycardia with left axis deviation and nonspecific T-wave changes in lateral leads    EKG interpreted by me  Repeat EKG obtained at 4:57 p m  After 2 g of magnesium 1 g of calcium were administered showed QTC of 525  After additional 1 g of calcium gluconate, repeat EKG at 6:00 p m   Showed QTc at 519 milliseconds  Labs:    Results Reviewed     Procedure Component Value Units Date/Time    Troponin I [[id number]]  (Normal) Collected: 02/14/21 1454    Lab Status: Final result Specimen: Blood from Arm, Left Updated: 02/14/21 1521     Troponin I 0 02 ng/mL     Calcium, ionized [[id number]]  (Abnormal) Collected: 02/14/21 1454    Lab Status: Final result Specimen: Blood from Arm, Left Updated: 02/14/21 1513     Calcium, Ionized 0 74 mmol/L     Basic metabolic panel [[id number]]  (Abnormal) Collected: 02/14/21 1454    Lab Status: Final result Specimen: Blood from Arm, Left Updated: 02/14/21 1511     Sodium 137 mmol/L      Potassium 5 2 mmol/L      Chloride 94 mmol/L      CO2 22 mmol/L      ANION GAP 21 mmol/L       mg/dL      Creatinine 17 86 mg/dL      Glucose 114 mg/dL      Calcium 6 6 mg/dL      eGFR 3 ml/min/1 73sq m     Narrative:      National Kid[employer] Disease Foundation guidelines for Chronic Kidney Disease (CKD):     Stage 1 with normal or high GFR (GFR > 90 mL/min/1 73 square meters)    Stage 2 Mild CKD (GFR = 60-89 mL/min/1 73 square meters)    Stage 3A Moderate CKD (GFR = 45-59 mL/min/1 73 square meters)    Stage 3B Moderate CKD (GFR = 30-44 mL/min/1 73 square meters)    Stage 4 Severe CKD (GFR = 15-29 mL/min/1 73 square meters)    Stage 5 End Stage CKD (GFR <15 mL/min/1 73 square meters)  Note: GFR calculation is accurate only with a steady state creatinine    CBC and differential [[id number]]  (Abnormal) Collected: 02/14/21 1454    Lab Status: Final result Specimen: Blood from Arm, Left Updated: 02/14/21 1501     WBC 4 58 Thousand/uL      RBC 3 75 Million/uL      Hemoglobin 9 7 g/dL      Hematocrit 31 2 %      MCV 83 fL      MCH 25 9 pg      MCHC 31 1 g/dL      RDW 18 2 %      MPV 10 5 fL      Platelets 508 Thousands/uL      nRBC 0 /100 WBCs      Neutrophils Relative 58 %      Immat GRANS % 1 %      Lymphocytes Relative 29 %      Monocytes Relative 9 %      Eosinophils Relative 3 %      Basophils Relative 0 %      Neutrophils Absolute 2 63 Thousands/µL      Immature Grans Absolute 0 04 Thousand/uL      Lymphocytes Absolute 1 33 Thousands/µL      Monocytes Absolute 0 43 Thousand/µL      Eosinophils Absolute 0 14 Thousand/µL      Basophils Absolute 0 01 Thousands/µL           All labs reviewed and utilized in the medical decision making process    Radiology:    XR chest 1 view portable (Results Pending)       All radiology studies independently viewed by me and interpreted by the radiologist     Procedure    Procedures     After discussion of risks and benefits, to include infection, failure to improve pain,  damage to deeper structures, bleeding, pneumothorax, patient consented to a trigger point injection  Sites of maximum pain located by palpation, skin prepped with alcohol, and 2% lidocaine without epinephrine infiltrated into 1 site of maximal pain at the left rhomboid  No air or blood seen on continuous aspiration while inserting the needle  Procedure was tolerated well without complications  Patient reported substantial relief of pain immediately, and complete relief after approximately 10 min  ED Course of Care and Re-Assessments      On initial presentation, pain felt to be musculoskeletal in origin  Resolved with trigger point injection to visibly and palpably spasmed rhomboid as above  Based on history of hypocalcemia, screening labs an EKG obtained, showing prolonged QTC as well as low calcium  Patient given magnesium and calcium gluconate with minimal improvement QTC interval, slight improvement with additional g of calcium gluconate  Patient's blood pressure also noted be trending up  On further questioning, patient states that he has not been taking any blood pressure medication for some time, states his medications were switched but is not yet picked up the new ones and had stopped taking the old ones    Based on worsening hypertension, no meds available at home, and persistently prolonged QTc in setting of electrolyte abnormalities, patient not felt to be stable for discharge home at this time  Medications   lidocaine (PF) (XYLOCAINE-MPF) 2 % injection 10 mL (10 mL Infiltration Given 2/14/21 1511)   magnesium sulfate 2 g/50 mL IVPB (premix) 2 g (0 g Intravenous Stopped 2/14/21 1613)   calcium gluconate 1 g in sodium chloride 0 9% 50 mL (premix) (0 g Intravenous Stopped 2/14/21 1632)   calcium gluconate 1 g in sodium chloride 0 9% 50 mL (premix) (0 g Intravenous Stopped 2/14/21 1757)   hydrALAZINE (APRESOLINE) injection 10 mg (10 mg Intravenous Given 2/14/21 1815)   metoprolol (LOPRESSOR) injection 5 mg (5 mg Intravenous Given 2/14/21 1846)           FINAL IMPRESSION    Final diagnoses:   Right shoulder pain   ESRD (end stage renal disease) ([facility])   Hypertension   Prolonged Q-T interval on ECG   Hypocalcemia         DISPOSITION/PLAN    Presentation as above with chief complaint of right shoulder pain likely secondary spasm muscle which resolved with trigger point injection as above  However, screening EKG and labs revealed significant electrolyte abnormalities as well as significantly prolonged QT  Also, patient's blood pressure failed to improve with pain control  Aggressive electrolyte repletion begun in ER, with minimal improvement QTC after 2 g magnesium 2 g calcium gluconate  Patient's blood pressure some refractory to treatment with initial agents  Signed out to Dr Kaitlynn Merritt at shift change, with plan for admission, likely 1 additional IV agent after appropriate interval with patient stable for admission to floor if systolic blood pressure improves or possibly needing step-down of nicardipine drip if continues to be refractory  Patient hypertensive but asymptomatic at time of sign out to Dr Kaitlynn Merritt at shift change    Time reflects when diagnosis was documented in both MDM as applicable and the Disposition within this note     Time User Action Codes Description Comment    2/14/2021  7:04 PM Treheidya Shiv Add [O45 164] Right shoulder pain     2/14/2021  7:04 PM [place] [facility] T Add [N18 6] [facility]     2/14/2021  7:04 PM Trenna Shiv Add [I10] Hypertension     2/14/2021  7:04 PM [place] [employer] T Add [R94 31] Prolonged Q-T interval on ECG     2/14/2021  7:04 PM [place] [employer] T Add [E83 51] Hypocalcemia       ED Disposition None      Follow-up Information    None           PATIENT REFERRED TO:    No follow-up provider specified  DISCHARGE MEDICATIONS:    Patient's Medications   Discharge Prescriptions    No medications on file       No discharge procedures on file           MD Darren Herrera MD  02/14/21 8719

## 2021-02-15 ENCOUNTER — APPOINTMENT (OUTPATIENT)
Dept: DIALYSIS | Facility: HOSPITAL | Age: 46
DRG: 194 | End: 2021-02-15
Payer: COMMERCIAL

## 2021-02-15 PROBLEM — M25.519 SHOULDER PAIN: Status: ACTIVE | Noted: 2021-02-15

## 2021-02-15 PROBLEM — I25.10 CORONARY ARTERIOSCLEROSIS: Status: ACTIVE | Noted: 2017-11-06

## 2021-02-15 PROBLEM — I16.0 HYPERTENSIVE URGENCY: Status: ACTIVE | Noted: 2021-02-15

## 2021-02-15 PROBLEM — R94.31 QT PROLONGATION: Status: ACTIVE | Noted: 2021-02-15

## 2021-02-15 PROBLEM — R07.9 CHEST PAIN: Status: ACTIVE | Noted: 2021-02-15

## 2021-02-15 LAB
ANION GAP SERPL CALCULATED.3IONS-SCNC: 21 MMOL/L (ref 4–13)
ANION GAP SERPL CALCULATED.3IONS-SCNC: 28 MMOL/L (ref 4–13)
ATRIAL RATE: 81 BPM
BUN SERPL-MCNC: 123 MG/DL (ref 5–25)
BUN SERPL-MCNC: 124 MG/DL (ref 5–25)
CALCIUM SERPL-MCNC: 6.5 MG/DL (ref 8.3–10.1)
CALCIUM SERPL-MCNC: 6.6 MG/DL (ref 8.3–10.1)
CHLORIDE SERPL-SCNC: 92 MMOL/L (ref 100–108)
CHLORIDE SERPL-SCNC: 92 MMOL/L (ref 100–108)
CO2 SERPL-SCNC: 17 MMOL/L (ref 21–32)
CO2 SERPL-SCNC: 20 MMOL/L (ref 21–32)
CREAT SERPL-MCNC: 18.65 MG/DL (ref 0.6–1.3)
CREAT SERPL-MCNC: 18.78 MG/DL (ref 0.6–1.3)
ERYTHROCYTE [DISTWIDTH] IN BLOOD BY AUTOMATED COUNT: 18.2 % (ref 11.6–15.1)
GFR SERPL CREATININE-BSD FRML MDRD: 3 ML/MIN/1.73SQ M
GFR SERPL CREATININE-BSD FRML MDRD: 3 ML/MIN/1.73SQ M
GLUCOSE SERPL-MCNC: 119 MG/DL (ref 65–140)
GLUCOSE SERPL-MCNC: 131 MG/DL (ref 65–140)
GLUCOSE SERPL-MCNC: 68 MG/DL (ref 65–140)
GLUCOSE SERPL-MCNC: 99 MG/DL (ref 65–140)
HCT VFR BLD AUTO: 31 % (ref 36.5–49.3)
HGB BLD-MCNC: 9.4 G/DL (ref 12–17)
MAGNESIUM SERPL-MCNC: 2.3 MG/DL (ref 1.6–2.6)
MCH RBC QN AUTO: 25.8 PG (ref 26.8–34.3)
MCHC RBC AUTO-ENTMCNC: 30.3 G/DL (ref 31.4–37.4)
MCV RBC AUTO: 85 FL (ref 82–98)
P AXIS: 54 DEGREES
PLATELET # BLD AUTO: 234 THOUSANDS/UL (ref 149–390)
PMV BLD AUTO: 10 FL (ref 8.9–12.7)
POTASSIUM SERPL-SCNC: 6.2 MMOL/L (ref 3.5–5.3)
POTASSIUM SERPL-SCNC: 6.3 MMOL/L (ref 3.5–5.3)
PR INTERVAL: 196 MS
QRS AXIS: -22 DEGREES
QRSD INTERVAL: 104 MS
QT INTERVAL: 474 MS
QTC INTERVAL: 550 MS
RBC # BLD AUTO: 3.64 MILLION/UL (ref 3.88–5.62)
SODIUM SERPL-SCNC: 133 MMOL/L (ref 136–145)
SODIUM SERPL-SCNC: 137 MMOL/L (ref 136–145)
T WAVE AXIS: 61 DEGREES
TROPONIN I SERPL-MCNC: <0.02 NG/ML
VENTRICULAR RATE: 81 BPM
WBC # BLD AUTO: 5.77 THOUSAND/UL (ref 4.31–10.16)

## 2021-02-15 PROCEDURE — 99223 1ST HOSP IP/OBS HIGH 75: CPT | Performed by: INTERNAL MEDICINE

## 2021-02-15 PROCEDURE — 36415 COLL VENOUS BLD VENIPUNCTURE: CPT | Performed by: EMERGENCY MEDICINE

## 2021-02-15 PROCEDURE — 85027 COMPLETE CBC AUTOMATED: CPT | Performed by: PHYSICIAN ASSISTANT

## 2021-02-15 PROCEDURE — 93010 ELECTROCARDIOGRAM REPORT: CPT | Performed by: INTERNAL MEDICINE

## 2021-02-15 PROCEDURE — 99220 PR INITIAL OBSERVATION CARE/DAY 70 MINUTES: CPT | Performed by: STUDENT IN AN ORGANIZED HEALTH CARE EDUCATION/TRAINING PROGRAM

## 2021-02-15 PROCEDURE — 93005 ELECTROCARDIOGRAM TRACING: CPT

## 2021-02-15 PROCEDURE — 84484 ASSAY OF TROPONIN QUANT: CPT | Performed by: PHYSICIAN ASSISTANT

## 2021-02-15 PROCEDURE — 84484 ASSAY OF TROPONIN QUANT: CPT | Performed by: EMERGENCY MEDICINE

## 2021-02-15 PROCEDURE — 80048 BASIC METABOLIC PNL TOTAL CA: CPT | Performed by: PHYSICIAN ASSISTANT

## 2021-02-15 PROCEDURE — 5A1D70Z PERFORMANCE OF URINARY FILTRATION, INTERMITTENT, LESS THAN 6 HOURS PER DAY: ICD-10-PCS | Performed by: INTERNAL MEDICINE

## 2021-02-15 PROCEDURE — 90935 HEMODIALYSIS ONE EVALUATION: CPT | Performed by: INTERNAL MEDICINE

## 2021-02-15 PROCEDURE — 94760 N-INVAS EAR/PLS OXIMETRY 1: CPT

## 2021-02-15 PROCEDURE — 83735 ASSAY OF MAGNESIUM: CPT | Performed by: PHYSICIAN ASSISTANT

## 2021-02-15 PROCEDURE — 90935 HEMODIALYSIS ONE EVALUATION: CPT

## 2021-02-15 PROCEDURE — 94640 AIRWAY INHALATION TREATMENT: CPT

## 2021-02-15 PROCEDURE — 82948 REAGENT STRIP/BLOOD GLUCOSE: CPT

## 2021-02-15 RX ORDER — DEXTROSE MONOHYDRATE 25 G/50ML
25 INJECTION, SOLUTION INTRAVENOUS ONCE
Status: COMPLETED | OUTPATIENT
Start: 2021-02-15 | End: 2021-02-15

## 2021-02-15 RX ORDER — LABETALOL 20 MG/4 ML (5 MG/ML) INTRAVENOUS SYRINGE
10 EVERY 6 HOURS PRN
Status: DISCONTINUED | OUTPATIENT
Start: 2021-02-15 | End: 2021-02-18 | Stop reason: HOSPADM

## 2021-02-15 RX ORDER — HYDRALAZINE HYDROCHLORIDE 25 MG/1
75 TABLET, FILM COATED ORAL EVERY 8 HOURS SCHEDULED
Status: DISCONTINUED | OUTPATIENT
Start: 2021-02-15 | End: 2021-02-18 | Stop reason: HOSPADM

## 2021-02-15 RX ORDER — CALCITRIOL 0.25 UG/1
1 CAPSULE, LIQUID FILLED ORAL DAILY
Status: DISCONTINUED | OUTPATIENT
Start: 2021-02-15 | End: 2021-02-18 | Stop reason: HOSPADM

## 2021-02-15 RX ORDER — HEPARIN SODIUM 5000 [USP'U]/ML
5000 INJECTION, SOLUTION INTRAVENOUS; SUBCUTANEOUS EVERY 8 HOURS SCHEDULED
Status: DISCONTINUED | OUTPATIENT
Start: 2021-02-15 | End: 2021-02-18 | Stop reason: HOSPADM

## 2021-02-15 RX ORDER — NIFEDIPINE 30 MG/1
90 TABLET, EXTENDED RELEASE ORAL DAILY
Status: DISCONTINUED | OUTPATIENT
Start: 2021-02-16 | End: 2021-02-18 | Stop reason: HOSPADM

## 2021-02-15 RX ORDER — ACETAMINOPHEN 325 MG/1
650 TABLET ORAL EVERY 6 HOURS PRN
Status: DISCONTINUED | OUTPATIENT
Start: 2021-02-15 | End: 2021-02-18 | Stop reason: HOSPADM

## 2021-02-15 RX ORDER — MAGNESIUM HYDROXIDE/ALUMINUM HYDROXICE/SIMETHICONE 120; 1200; 1200 MG/30ML; MG/30ML; MG/30ML
30 SUSPENSION ORAL EVERY 6 HOURS PRN
Status: DISCONTINUED | OUTPATIENT
Start: 2021-02-15 | End: 2021-02-18 | Stop reason: HOSPADM

## 2021-02-15 RX ORDER — CALCIUM GLUCONATE 20 MG/ML
1 INJECTION, SOLUTION INTRAVENOUS ONCE
Status: COMPLETED | OUTPATIENT
Start: 2021-02-15 | End: 2021-02-15

## 2021-02-15 RX ORDER — ALBUTEROL SULFATE 2.5 MG/3ML
10 SOLUTION RESPIRATORY (INHALATION) ONCE
Status: COMPLETED | OUTPATIENT
Start: 2021-02-15 | End: 2021-02-15

## 2021-02-15 RX ORDER — CARVEDILOL 12.5 MG/1
25 TABLET ORAL 2 TIMES DAILY WITH MEALS
Status: DISCONTINUED | OUTPATIENT
Start: 2021-02-15 | End: 2021-02-18 | Stop reason: HOSPADM

## 2021-02-15 RX ADMIN — INSULIN HUMAN 10 UNITS: 100 INJECTION, SOLUTION PARENTERAL at 05:20

## 2021-02-15 RX ADMIN — NIFEDIPINE 90 MG: 30 TABLET, FILM COATED, EXTENDED RELEASE ORAL at 00:12

## 2021-02-15 RX ADMIN — CARVEDILOL 25 MG: 12.5 TABLET, FILM COATED ORAL at 09:33

## 2021-02-15 RX ADMIN — CALCITRIOL CAPSULES 0.25 MCG 1 MCG: 0.25 CAPSULE ORAL at 09:33

## 2021-02-15 RX ADMIN — DEXTROSE MONOHYDRATE 25 ML: 500 INJECTION PARENTERAL at 05:20

## 2021-02-15 RX ADMIN — ALBUTEROL SULFATE 10 MG: 2.5 SOLUTION RESPIRATORY (INHALATION) at 05:50

## 2021-02-15 RX ADMIN — HYDRALAZINE HYDROCHLORIDE 75 MG: 25 TABLET, FILM COATED ORAL at 05:28

## 2021-02-15 RX ADMIN — CARVEDILOL 25 MG: 12.5 TABLET, FILM COATED ORAL at 16:53

## 2021-02-15 RX ADMIN — CALCIUM GLUCONATE 1 G: 20 INJECTION, SOLUTION INTRAVENOUS at 05:20

## 2021-02-15 RX ADMIN — HYDRALAZINE HYDROCHLORIDE 75 MG: 25 TABLET, FILM COATED ORAL at 21:58

## 2021-02-15 RX ADMIN — LABETALOL 20 MG/4 ML (5 MG/ML) INTRAVENOUS SYRINGE 10 MG: at 01:57

## 2021-02-15 RX ADMIN — HYDRALAZINE HYDROCHLORIDE 75 MG: 25 TABLET, FILM COATED ORAL at 16:53

## 2021-02-15 NOTE — PROGRESS NOTES
HEMODIALYSIS ROUNDING NOTE    Patient: Chari Cherry               Sex: male          DOA: 2/14/2021  2:30 PM   YOB: 1975        Age:  39 y o         LOS:  LOS: 0 days             SUBJECTIVE     - Patient was seen during hemodialysis today  - Reviewed last 24 hrs events     Tolerating dialysis very well without any complaint    No chest pain no palpitation or shortness of breath    CURRENT MEDICATIONS       Current Facility-Administered Medications:     acetaminophen (TYLENOL) tablet 650 mg, 650 mg, Oral, Q6H PRN, Ever Scales, PA-C    aluminum-magnesium hydroxide-simethicone (MYLANTA) oral suspension 30 mL, 30 mL, Oral, Q6H PRN, Ever Scales, PA-C    calcitriol (ROCALTROL) capsule 1 mcg, 1 mcg, Oral, Daily, Ever Scales, PA-C, 1 mcg at 02/15/21 0933    carvedilol (COREG) tablet 25 mg, 25 mg, Oral, BID With Meals, Ever Scales, PA-C, 25 mg at 02/15/21 0933    heparin (porcine) subcutaneous injection 5,000 Units, 5,000 Units, Subcutaneous, Q8H [address] **AND** [CANCELED] Platelet count, , , Once, Ever Scales, PA-C    hydrALAZINE (APRESOLINE) tablet 75 mg, 75 mg, Oral, Q8H [address], Amy Cornell, PA-C, 75 mg at 02/15/21 0528    Labetalol HCl (NORMODYNE) injection 10 mg, 10 mg, Intravenous, Q6H PRN, Ever Scales, PA-C, 10 mg at 02/15/21 0157    [START ON 2/16/2021] NIFEdipine (PROCARDIA XL) 24 hr tablet 90 mg, 90 mg, Oral, Daily, Ever Scales, PA-C    OBJECTIVE     Current Weight: Weight - Scale: 107 kg (236 lb 15 9 oz)  Vitals:    02/15/21 1330   BP: 136/73   Pulse: 83   Resp: 18   Temp:    SpO2:        Intake/Output Summary (Last 24 hours) at 2/15/2021 1349  Last data filed at 2/15/2021 1225  Gross per 24 hour   Intake 200 ml   Output --   Net 200 ml       PHYSICAL EXAMINATION     Physical Exam  Constitutional:       General: He is not in acute distress  Appearance: He is well-developed  HENT:      Head: Normocephalic  Eyes:      General: No scleral icterus  Conjunctiva/sclera: Conjunctivae normal    Neck:      Musculoskeletal: Neck supple  Vascular: No JVD  Cardiovascular:      Rate and Rhythm: Normal rate  Heart sounds: Normal heart sounds  Pulmonary:      Effort: Pulmonary effort is normal       Breath sounds: No wheezing  Abdominal:      Palpations: Abdomen is soft  Tenderness: There is no abdominal tenderness  Musculoskeletal: Normal range of motion  Skin:     General: Skin is warm  Findings: No rash  Neurological:      Mental Status: He is alert and oriented to person, place, and time  Psychiatric:         Behavior: Behavior normal            LAB RESULTS     Results from last 7 days   Lab Units 02/15/21  0933 02/15/21  0415 02/14/21  1454 02/10/21  1130   WBC Thousand/uL  --  5 77 4 58  --    HEMOGLOBIN g/dL  --  9 4* 9 7*  --    HEMATOCRIT %  --  31 0* 31 2*  --    PLATELETS Thousands/uL  --  234 244  --    POTASSIUM mmol/L 6 2* 6 3* 5 2  --    CHLORIDE mmol/L 92* 92* 94*  --    CO2 mmol/L 17* 20* 22  --    BUN mg/dL 123* 124* 113*  --    CREATININE mg/dL 18 78* 18 65* 17 86*  --    EGFR ml/min/1 73sq m 3 3 3  --    CALCIUM mg/dL 6 6* 6 5* 6 6* 6 7*   MAGNESIUM mg/dL  --  2 3  --   --        RADIOLOGY RESULTS     Results for orders placed during the hospital encounter of 02/14/21   XR chest 1 view portable    Narrative CHEST     INDICATION:   weakness  COMPARISON:  1/27/2021    EXAM PERFORMED/VIEWS:  XR CHEST PORTABLE      FINDINGS:  ICD transvenous pacemaker present as on previous examination  The cardiac silhouette is without change from the prior study  The lungs are clear  No pneumothorax or pleural effusion  Osseous structures appear within normal limits for patient age        Impression No acute pulmonary disease            Workstation performed: [id number]       Results for orders placed during the hospital encounter of 01/27/21   XR chest 2 views    Narrative CHEST     INDICATION:  Neck and back pain     COMPARISON:  10/25/2020, CT chest 7/3/2020    EXAM PERFORMED/VIEWS:  XR CHEST AP & LATERAL    FINDINGS:    Cardiomediastinal silhouette appears stable  Left chest wall AICD device  The lungs are clear  No pneumothorax or pleural effusion  Suggestive evidence of increased bony density associated with history of end-stage renal disease  Impression No acute cardiopulmonary disease  Workstation performed: [id number]       Results for orders placed during the hospital encounter of 07/03/20   CT chest without contrast    Narrative CT CHEST WITHOUT IV CONTRAST    INDICATION:   sob  COMPARISON:  None  TECHNIQUE: CT examination of the chest was performed without intravenous contrast   Axial, sagittal, and coronal 2D reformatted images were created from the source data and submitted for interpretation  Radiation dose length product (DLP) for this visit:  278 mGy-cm   This examination, like all CT scans performed in the [facility], was performed utilizing techniques to minimize radiation dose exposure, including the use of iterative   reconstruction and automated exposure control  FINDINGS:    LUNGS:  The trachea and central bronchial tree are patent  Diffuse airspace opacities are seen throughout the lungs (left greater than right)  PLEURA:  Trace left-sided pleural effusion is seen  HEART/GREAT VESSELS:  The heart is enlarged  Trace pericardial effusion is seen  MEDIASTINUM AND MAIXMILIAN:  Mediastinal lymph nodes measuring up to 1 3 cm are visualized  Limited evaluation the hilar region due to lack of intravenous contrast     CHEST WALL AND LOWER NECK:   Left-sided central venous catheter is seen  VISUALIZED STRUCTURES IN THE UPPER ABDOMEN:  Unremarkable  OSSEOUS STRUCTURES:  Increased sclerosis of the osseous structures is seen  Impression Diffuse airspace opacities throughout the lungs which may represent infection versus pulmonary edema  Recommend short-term follow-up CT scan of the chest to evaluate for resolution in 3 months  The study was marked in EPIC for significant notification  Workstation performed: [id number]       No results found for this or any previous visit  No results found for this or any previous visit  No results found for this or any previous visit  PLAN / RECOMMENDATIONS     1  End Stage Renal Disease:       I saw and examined patient during hemodialysis treatment  The patient was receiving hemodialysis for treatment of end stage renal disease  I have also reviewed vital signs, intake and output, lab results and recent events, and agreed with today's dialysis order  Access: No issue    2  Anemia:   stable    3  Hyperkalemia:  Getting dialyzed on 1K bath    Will continue to monitor    Martin Ibrahim MD  Nephrology  2/15/2021        Portions of the record may have been created with voice recognition software  Occasional wrong word or "sound a like" substitutions may have occurred due to the inherent limitations of voice recognition software  Read the chart carefully and recognize, using context, where substitutions have occurred

## 2021-02-15 NOTE — CONSULTS
Consultation - Nephrology   Phoenix Mukherjee 39 y o  male MRN: [id number]  Unit/Bed#: -01 Encounter: [id number]    Referring PHYSICIAN:  Terry Hardy     REASON FOR THE CONSULTATION:  ESRD    DATE OF CONSULTATION:  February 15, 2021    ADMISSION DIAGNOSIS: Hypertensive urgency     CHIEF COMPLAINT     Patient with ESRD on home hemodialysis who had recent parathyroidectomy and has a problem hypocalcemia and came to emergency generalized achiness and was found to be hypocalcemic and uncontrolled blood pressure admitted    HPI     Patient is feeling better though still has some achiness and chest pain    Blood pressure is better controlled    Patient is on dialysis for a while with hypertensive nephrosclerosis  Recently he had parathyroidectomy because of hyperparathyroidism  It is completely recurrent hypocalcemia  He was getting IV calcium replacement as outpatient per because of insurance reason now is on p o  Calcium which is not working well    Patient does get diffuse muscle pain because of hypocalcemia  Seems to be better now without any complaint    PAST MEDICAL HISTORY     Past Medical History:   Diagnosis Date    Chronic kidney disease     Hypertension     Pressure injury of skin     Renal disorder     dialysis        PAST SURGICAL HISTORY     Past Surgical History:   Procedure Laterality Date    CARDIAC DEFIBRILLATOR PLACEMENT      IR AV FISTULAGRAM/GRAFTOGRAM  1/18/2019    IR TUNNELED CENTRAL LINE PLACEMENT  10/29/2020    MO EXPLORE PARATHYROID GLANDS N/A 10/22/2020    Procedure:  Three gland PARATHYROIDECTOMY, four gland exploration, intraoperative PTH monitoring;  Surgeon: Valdemar Yoo MD;  Location: [facility];  Service: Surgical Oncology    SPLIT THICKNESS SKIN GRAFT Left 12/2/2020    Procedure: SKIN GRAFT SPLIT THICKNESS (STSG)  LEFT ARM;  Surgeon: Johny Reynolds MD;  Location: [facility];  Service: Plastics    WOUND DEBRIDEMENT Left 12/2/2020    Procedure: DEBRIDEMENT OF LEFT ARM WOUND;  Surgeon: Alanis Lazar MD;  Location: [facility];  Service: Plastics       ALLERGIES     No Known Allergies    SOCIAL HISTORY     Social History     Substance and Sexual Activity   Alcohol Use Not Currently    Frequency: Never     Social History     Substance and Sexual Activity   Drug Use Never     Social History     Tobacco Use   Smoking Status Former Smoker    Quit date: 6/15/2010    Years since quitting: 10 6   Smokeless Tobacco Never Used       FAMILY HISTORY     History reviewed  No pertinent family history  CURRENT MEDICATIONS       Current Facility-Administered Medications:     acetaminophen (TYLENOL) tablet 650 mg, 650 mg, Oral, Q6H PRN, JACQUES Hernandez-JONATHAN    aluminum-magnesium hydroxide-simethicone (MYLANTA) oral suspension 30 mL, 30 mL, Oral, Q6H PRN, Joana Hall PA-C    calcitriol (ROCALTROL) capsule 1 mcg, 1 mcg, Oral, Daily, JACQUES Hernandez-JONATHAN, 1 mcg at 02/15/21 0933    carvedilol (COREG) tablet 25 mg, 25 mg, Oral, BID With Meals, JACQUES Hernandez-JONATHAN, 25 mg at 02/15/21 0933    heparin (porcine) subcutaneous injection 5,000 Units, 5,000 Units, Subcutaneous, Q8H [address] **AND** [CANCELED] Platelet count, , , Once, Joana Hall PA-C    hydrALAZINE (APRESOLINE) tablet 75 mg, 75 mg, Oral, Q8H [address], Amy Cornell PA-C, 75 mg at 02/15/21 9413    Labetalol HCl (NORMODYNE) injection 10 mg, 10 mg, Intravenous, Q6H PRN, Joana Hall PA-C, 10 mg at 02/15/21 0157    [START ON 2/16/2021] NIFEdipine (PROCARDIA XL) 24 hr tablet 90 mg, 90 mg, Oral, Daily, JACQUES Hernandez-C    REVIEW OF SYSTEMS     Review of Systems   Constitutional: Negative for activity change and fatigue  HENT: Negative for congestion and ear discharge  Eyes: Negative for photophobia and pain  Respiratory: Negative for apnea and choking  Cardiovascular: Negative for chest pain and palpitations  Gastrointestinal: Negative for abdominal distention and blood in stool     Endocrine: Negative for heat intolerance and polyphagia  Genitourinary: Negative for flank pain and urgency  Musculoskeletal: Negative for neck pain and neck stiffness  Skin: Negative for color change and wound  Allergic/Immunologic: Negative for food allergies and immunocompromised state  Neurological: Negative for seizures and facial asymmetry  Hematological: Negative for adenopathy  Does not bruise/bleed easily  Psychiatric/Behavioral: Negative for self-injury and suicidal ideas  LAB RESULTS        Results from last 7 days   Lab Units 02/15/21  0933 02/15/21  0415 02/14/21  1454 02/10/21  1130   WBC Thousand/uL  --  5 77 4 58  --    HEMOGLOBIN g/dL  --  9 4* 9 7*  --    HEMATOCRIT %  --  31 0* 31 2*  --    PLATELETS Thousands/uL  --  234 244  --    POTASSIUM mmol/L 6 2* 6 3* 5 2  --    CHLORIDE mmol/L 92* 92* 94*  --    CO2 mmol/L 17* 20* 22  --    BUN mg/dL 123* 124* 113*  --    CREATININE mg/dL 18 78* 18 65* 17 86*  --    EGFR ml/min/1 73sq m 3 3 3  --    CALCIUM mg/dL 6 6* 6 5* 6 6* 6 7*   MAGNESIUM mg/dL  --  2 3  --   --        I have personally reviewed the old medical records and patient's previously known baseline creatinine level is ~ patient has ESRD and has fluctuating creatinine level based on dialysis    RADIOLOGY RESULTS     Results for orders placed during the hospital encounter of 02/14/21   XR chest 1 view portable    Narrative CHEST     INDICATION:   weakness  COMPARISON:  1/27/2021    EXAM PERFORMED/VIEWS:  XR CHEST PORTABLE      FINDINGS:  ICD transvenous pacemaker present as on previous examination  The cardiac silhouette is without change from the prior study  The lungs are clear  No pneumothorax or pleural effusion  Osseous structures appear within normal limits for patient age        Impression No acute pulmonary disease            Workstation performed: [id number]       Results for orders placed during the hospital encounter of 01/27/21   XR chest 2 views    Narrative CHEST INDICATION:  Neck and back pain  COMPARISON:  10/25/2020, CT chest 7/3/2020    EXAM PERFORMED/VIEWS:  XR CHEST AP & LATERAL    FINDINGS:    Cardiomediastinal silhouette appears stable  Left chest wall AICD device  The lungs are clear  No pneumothorax or pleural effusion  Suggestive evidence of increased bony density associated with history of end-stage renal disease  Impression No acute cardiopulmonary disease  Workstation performed: [id number]       Results for orders placed during the hospital encounter of 07/03/20   CT chest without contrast    Narrative CT CHEST WITHOUT IV CONTRAST    INDICATION:   sob  COMPARISON:  None  TECHNIQUE: CT examination of the chest was performed without intravenous contrast   Axial, sagittal, and coronal 2D reformatted images were created from the source data and submitted for interpretation  Radiation dose length product (DLP) for this visit:  278 mGy-cm   This examination, like all CT scans performed in the [facility], was performed utilizing techniques to minimize radiation dose exposure, including the use of iterative   reconstruction and automated exposure control  FINDINGS:    LUNGS:  The trachea and central bronchial tree are patent  Diffuse airspace opacities are seen throughout the lungs (left greater than right)  PLEURA:  Trace left-sided pleural effusion is seen  HEART/GREAT VESSELS:  The heart is enlarged  Trace pericardial effusion is seen  MEDIASTINUM AND MAXIMILIAN:  Mediastinal lymph nodes measuring up to 1 3 cm are visualized  Limited evaluation the hilar region due to lack of intravenous contrast     CHEST WALL AND LOWER NECK:   Left-sided central venous catheter is seen  VISUALIZED STRUCTURES IN THE UPPER ABDOMEN:  Unremarkable  OSSEOUS STRUCTURES:  Increased sclerosis of the osseous structures is seen        Impression Diffuse airspace opacities throughout the lungs which may represent infection versus pulmonary edema  Recommend short-term follow-up CT scan of the chest to evaluate for resolution in 3 months  The study was marked in EPIC for significant notification  Workstation performed: [id number]       No results found for this or any previous visit  No results found for this or any previous visit  No results found for this or any previous visit  OBJECTIVE     Current Weight: Weight - Scale: 107 kg (236 lb 15 9 oz)  Vitals:    02/15/21 1300   BP: 136/73   Pulse: 83   Resp: 18   Temp:    SpO2:        Intake/Output Summary (Last 24 hours) at 2/15/2021 1310  Last data filed at 2/15/2021 1225  Gross per 24 hour   Intake 200 ml   Output --   Net 200 ml       PHYSICAL EXAMINATION     Physical Exam  Constitutional:       General: He is not in acute distress  Appearance: Normal appearance  He is well-developed  HENT:      Head: Normocephalic and atraumatic  Mouth/Throat:      Mouth: Mucous membranes are moist    Eyes:      General: No scleral icterus  Extraocular Movements: Extraocular movements intact  Conjunctiva/sclera: Conjunctivae normal       Pupils: Pupils are equal, round, and reactive to light  Neck:      Musculoskeletal: Neck supple  Vascular: No JVD  Cardiovascular:      Rate and Rhythm: Normal rate and regular rhythm  Heart sounds: Normal heart sounds  Pulmonary:      Effort: Pulmonary effort is normal       Breath sounds: Normal breath sounds  No wheezing  Abdominal:      General: Bowel sounds are normal       Palpations: Abdomen is soft  Tenderness: There is no abdominal tenderness  Musculoskeletal: Normal range of motion  Skin:     General: Skin is warm  Findings: No rash  Neurological:      Mental Status: He is alert and oriented to person, place, and time  Psychiatric:         Behavior: Behavior normal           PLAN / RECOMMENDATIONS      ESRD:  Patient is on home dialysis    Supposed to get dialyzed yesterday and did not get dialysis  I will dialyze him today    Hyperkalemia:  Likely because of missing dialysis treatment  Will get dialyzed with 1K bath    Hypocalcemia:  After parathyroidectomy  Being replaced    Hypertension:  Much better control now  Disposition:  Can be discharged after dialysis renal point of view    Thank you for the consultation to participate in patient's care  I have personally discussed my plan with the referring physician  Flavio Dawn MD  Nephrology  2/15/2021        Portions of the record may have been created with voice recognition software  Occasional wrong word or "sound a like" substitutions may have occurred due to the inherent limitations of voice recognition software  Read the chart carefully and recognize, using context, where substitutions have occurred

## 2021-02-15 NOTE — PLAN OF CARE
Problem: Potential for Falls  Goal: Patient will remain free of falls  Description: INTERVENTIONS:  - Assess patient frequently for physical needs  -  Identify cognitive and physical deficits and behaviors that affect risk of falls    -  [place] fall precautions as indicated by assessment   - Educate patient/family on patient safety including physical limitations  - Instruct patient to call for assistance with activity based on assessment  - Modify environment to reduce risk of injury  - Consider OT/PT consult to assist with strengthening/mobility  Outcome: Progressing     Problem: PAIN - ADULT  Goal: Verbalizes/displays adequate comfort level or baseline comfort level  Description: Interventions:  - Encourage patient to monitor pain and request assistance  - Assess pain using appropriate pain scale  - Administer analgesics based on type and severity of pain and evaluate response  - Implement non-pharmacological measures as appropriate and evaluate response  - Consider cultural and social influences on pain and pain management  - Notify physician/advanced practitioner if interventions unsuccessful or patient reports new pain  Outcome: Progressing     Problem: INFECTION - ADULT  Goal: Absence or prevention of progression during hospitalization  Description: INTERVENTIONS:  - Assess and monitor for signs and symptoms of infection  - Monitor lab/diagnostic results  - Monitor all insertion sites, i e  indwelling lines, tubes, and drains  - Monitor endotracheal if appropriate and nasal secretions for changes in amount and color  - [place] appropriate cooling/warming therapies per order  - Administer medications as ordered  - Instruct and encourage patient and family to use good hand hygiene technique  - Identify and instruct in appropriate isolation precautions for identified infection/condition  Outcome: Progressing  Goal: Absence of fever/infection during neutropenic period  Description: INTERVENTIONS:  - Monitor WBC    Outcome: Progressing     Problem: SAFETY ADULT  Goal: Patient will remain free of falls  Description: INTERVENTIONS:  - Assess patient frequently for physical needs  -  Identify cognitive and physical deficits and behaviors that affect risk of falls    -  [place] fall precautions as indicated by assessment   - Educate patient/family on patient safety including physical limitations  - Instruct patient to call for assistance with activity based on assessment  - Modify environment to reduce risk of injury  - Consider OT/PT consult to assist with strengthening/mobility  Outcome: Progressing  Goal: Maintain or return to baseline ADL function  Description: INTERVENTIONS:  -  Assess patient's ability to carry out ADLs; assess patient's baseline for ADL function and identify physical deficits which impact ability to perform ADLs (bathing, care of mouth/teeth, toileting, grooming, dressing, etc )  - Assess/evaluate cause of self-care deficits   - Assess range of motion  - Assess patient's mobility; develop plan if impaired  - Assess patient's need for assistive devices and provide as appropriate  - Encourage maximum independence but intervene and supervise when necessary  - Involve family in performance of ADLs  - Assess for home care needs following discharge   - Consider OT consult to assist with ADL evaluation and planning for discharge  - Provide patient education as appropriate  Outcome: Progressing  Goal: Maintain or return mobility status to optimal level  Description: INTERVENTIONS:  - Assess patient's baseline mobility status (ambulation, transfers, stairs, etc )    - Identify cognitive and physical deficits and behaviors that affect mobility  - Identify mobility aids required to assist with transfers and/or ambulation (gait belt, sit-to-stand, lift, walker, cane, etc )  - [place] fall precautions as indicated by assessment  - Record patient progress and toleration of activity level on Mobility SBAR; progress patient to next Phase/Stage  - Instruct patient to call for assistance with activity based on assessment  - Consider rehabilitation consult to assist with strengthening/weightbearing, etc   Outcome: Progressing     Problem: DISCHARGE PLANNING  Goal: Discharge to home or other facility with appropriate resources  Description: INTERVENTIONS:  - Identify barriers to discharge w/patient and caregiver  - Arrange for needed discharge resources and transportation as appropriate  - Identify discharge learning needs (meds, wound care, etc )  - Arrange for interpretive services to assist at discharge as needed  - Refer to Case Management Department for coordinating discharge planning if the patient needs post-hospital services based on physician/advanced practitioner order or complex needs related to functional status, cognitive ability, or social support system  Outcome: Progressing     Problem: Knowledge Deficit  Goal: Patient/family/caregiver demonstrates understanding of disease process, treatment plan, medications, and discharge instructions  Description: Complete learning assessment and assess knowledge base    Interventions:  - Provide teaching at level of understanding  - Provide teaching via preferred learning methods  Outcome: Progressing     Problem: CARDIOVASCULAR - ADULT  Goal: Maintains optimal cardiac output and hemodynamic stability  Description: INTERVENTIONS:  - Monitor I/O, vital signs and rhythm  - Monitor for S/S and trends of decreased cardiac output  - Administer and titrate ordered vasoactive medications to optimize hemodynamic stability  - Assess quality of pulses, skin color and temperature  - Assess for signs of decreased coronary artery perfusion  - Instruct patient to report change in severity of symptoms  Outcome: Progressing  Goal: Absence of cardiac dysrhythmias or at baseline rhythm  Description: INTERVENTIONS:  - Continuous cardiac monitoring, vital signs, obtain 12 lead EKG if ordered  - Administer antiarrhythmic and heart rate control medications as ordered  - Monitor electrolytes and administer replacement therapy as ordered  Outcome: Progressing     Problem: METABOLIC, FLUID AND ELECTROLYTES - ADULT  Goal: Electrolytes maintained within normal limits  Description: INTERVENTIONS:  - Monitor labs and assess patient for signs and symptoms of electrolyte imbalances  - Administer electrolyte replacement as ordered  - Monitor response to electrolyte replacements, including repeat lab results as appropriate  - Instruct patient on fluid and nutrition as appropriate  Outcome: Progressing  Goal: Fluid balance maintained  Description: INTERVENTIONS:  - Monitor labs   - Monitor I/O and WT  - Instruct patient on fluid and nutrition as appropriate  - Assess for signs & symptoms of volume excess or deficit  Outcome: Progressing  Goal: Glucose maintained within target range  Description: INTERVENTIONS:  - Monitor Blood Glucose as ordered  - Assess for signs and symptoms of hyperglycemia and hypoglycemia  - Administer ordered medications to maintain glucose within target range  - Assess nutritional intake and initiate nutrition service referral as needed  Outcome: Progressing     Problem: HEMATOLOGIC - ADULT  Goal: Maintains hematologic stability  Description: INTERVENTIONS  - Assess for signs and symptoms of bleeding or hemorrhage  - Monitor labs  - Administer supportive blood products/factors as ordered and appropriate  Outcome: Progressing

## 2021-02-15 NOTE — CASE MANAGEMENT
Cm met with the pt at bedside to discuss dcp  The pt resides with his dtr in a 2 story home with 2 STARLA and 0 Steps to his room  The pt does not use any DME and is able to complete all of his ADLs w/o any assistance  The pt has had [facility] in the past   The pt has no Hx of STR/MH or SA  The pt fills Rx at [address] in [facility] and is able to afford his co-pays  The pt does home dialysis daily  The pt states his dtr is his POA, but did not have any official pw  The pt requested and received the POA pw  The pt is not employed and he drives himself to and from appts  CM reviewed discharge planning process including the following: identifying caregivers at home, preference for d/c planning needs, availability of treatment team to discuss questions or concerns patient and/or family may have regarding diagnosis, plan of care, old or new medications and discharge planning  Discharge Checklist reviewed and CM will continue to monitor for progress toward discharge goals in nursing and provider rounds  The pt has requested that the cm refer him to [facility] and his dtr will transport him home at dc

## 2021-02-15 NOTE — ASSESSMENT & PLAN NOTE
· On BMP 6 6, ionized calcium 0 74  · Chronic issue and patient does report noncompliance with medication at home  · Two doses IV gluconate 1 g given in ED along with 1 dose IV magnesium  · Recheck BMP for a m  · Continue home calcitriol 1 g q d    · Appreciate nephrology consult for dialysis

## 2021-02-15 NOTE — ASSESSMENT & PLAN NOTE
Wt Readings from Last 3 Encounters:   02/14/21 107 kg (236 lb 15 9 oz)   01/26/21 99 8 kg (220 lb 0 3 oz)   01/11/21 99 8 kg (220 lb)   · No signs of fluid overload at this time  · Cardiac diet  · Continue home Coreg

## 2021-02-15 NOTE — ASSESSMENT & PLAN NOTE
· Patient presented to ED for left shoulder pain and radiation to chest  · Incidentally noted elevated blood pressure initially 222/124  · In setting of noncompliance as patient reports he has not been taking his home medications  · Despite multiple IV hypertension medications no real change in blood pressure from over 200s over 100s  · Seen by critical care for evaluation suggesting restarting home medications and rechecking blood pressure 2 hours later  · CT head negative  · Chest x-ray revealing AICD in place  · EKG x2 revealing normal sinus rhythm, prolonged QTC  · After receiving p o  Coreg 25 mg blood pressure did decrease a little bit to 188/98  · Upon approach remaining in 190s over 100  · Patient also given p o  Nifedipine 90 mg and p o  Hydralazine 75 mg in the ED  · Will continue to closely monitor blood pressure q 1 hour x6 hours then q 2 hours for now  · Will resume home meds including Coreg 25 mg p o  B i d , hydralazine 75 mg p o  Q 8 hours, nifedipine 90 mg p o  Q d    · Educated on importance of compliance with medication outpatient  · P r n  IV labetalol 10 mg q 6 hours also added with parameters  · No sign of end-organ damage at this time, patient without complaints, heart rate stable  · Case management consult for help with medication for discharge

## 2021-02-15 NOTE — QUICK NOTE
Notified by RN potassium came back elevated at 6 3 on repeat from 5 2    Will add telemetry for now  For hyperkalemia treatment will add 1 dose IV insulin 10 units with dextrose 50% 25 mL  Recheck blood glucose prior to giving and 30 minutes after giving doses  Also added 1 dose albuterol inhalation and 1 dose 1 g IV calcium gluconate to help not only with hyperkalemia but also hypocalcemia  Recheck BMP at 8:00 a m  Angeles Console   Patient also due for hemodialysis today and nephro consult in

## 2021-02-15 NOTE — NURSING NOTE
Patient found with a bottle of tramadol under his pillow case from [address]  Medication confiscated and informed patient that this would be sent to pharmacy, patient did not want medication to go to pharmacy, This RN explained that he cannot have any medications in his room from home that all medication will be administered by staff here in the hospital and that home medications cannot be taken  Patient agreeable to having it locked in his med box in his room until family can come tomorrow to pick it up  Patient also stated that he had been taking the tramadol while in the Emergency room today  Surya Samaniego made aware

## 2021-02-15 NOTE — QUICK NOTE
Notified by RN on the floor that patient was trying to sneak in outpatient tramadol bottle underneath his bed  RN did confiscate and patient was refusing to sent to pharmacy but was agreeable to have it locked up  Per RN patient did report taking 1 dose of tramadol while down in the ED  At this point will avoid any narcotics

## 2021-02-15 NOTE — ASSESSMENT & PLAN NOTE
Lab Results   Component Value Date    EGFR 3 02/14/2021    EGFR 4 01/27/2021    EGFR 4 01/11/2021    CREATININE 17 86 (H) 02/14/2021    CREATININE 14 13 (H) 01/27/2021    CREATININE 13 71 (H) 01/11/2021   · Creatinine currently 17 86,   · Baseline per review chart around 14-15  · Last dialysis session on Friday, due for dialysis today 2/15  · Appreciate nephrology consult for dialysis  · Monitor BMP

## 2021-02-15 NOTE — ASSESSMENT & PLAN NOTE
· Initially on arrival complaining of left shoulder pain radiation with chest pain as well  · Upon evaluation patient denies any chest pain reports that did resolve quickly upon arrival several hours ago  · SUMMER 1  · Initial troponin negative  · Trend troponin 2 more times with EKG  · Initial EKGs revealing normal sinus rhythm, prolonged QTC  · Monitor on telemetry  · If any elevation troponin or onset of new chest pain consider cardiology consult

## 2021-02-15 NOTE — ASSESSMENT & PLAN NOTE
· Lab Results   Component Value Date    EGFR 3 02/14/2021    EGFR 4 01/27/2021    EGFR 4 01/11/2021    CREATININE 17 86 (H) 02/14/2021    CREATININE 14 13 (H) 01/27/2021    CREATININE 13 71 (H) 01/11/2021   · Hemoglobin currently 9 7  · Does appear slightly above baseline which is around 8-9  · No active bleeding  · Monitor CBC

## 2021-02-15 NOTE — PLAN OF CARE
On 1 K bath for serum K-6 3  UF-2l  Problem: METABOLIC, FLUID AND ELECTROLYTES - ADULT  Goal: Electrolytes maintained within normal limits  Description: INTERVENTIONS:  - Monitor labs and assess patient for signs and symptoms of electrolyte imbalances  - Administer electrolyte replacement as ordered  - Monitor response to electrolyte replacements, including repeat lab results as appropriate  - Instruct patient on fluid and nutrition as appropriate  Outcome: Progressing  Goal: Fluid balance maintained  Description: INTERVENTIONS:  - Monitor labs   - Monitor I/O and WT  - Instruct patient on fluid and nutrition as appropriate  - Assess for signs & symptoms of volume excess or deficit  Outcome: Progressing

## 2021-02-15 NOTE — ASSESSMENT & PLAN NOTE
· Initial report of left shoulder pain which has since resolved status post trigger point injection  · Follow-up outpatient with PCP

## 2021-02-15 NOTE — QUICK NOTE
Progress Note - Triage Asssessment   Alysia Mccarthy 39 y o  male MRN: [id number]    Time Called ( Time): 2100  Date Called: 02/14/21  Room#: FF98  Person requesting evaluation: Dr Robe Westbrook    Situation:    Patient with past medical history significant for hypertension, end-stage renal disease dependent on dialysis, and AICD placement presented to the emergency room for chronic weakness over the past month, found to be hypocalcemic and provided calcium IV  Patient also hypertensive after dosing, but also of note patient has not been taking his blood pressure medications over several weeks per the ER physician record (Dr Wale Siu)  Attempted Lopressor 5 mg IV, and hydralazine 10 mg IV without much effect  Patient prescribed hydralazine 75 mg p o  3 times daily, Procardia XL 90 mg once daily, and  Coreg 25 mg p o  B i d   Patient declines to reveal to me if he has taken his medications, or what time of day takes his medications  He is pacing about the room, unable to examine patient as he declines to be seated  Appears to be nonfocal     Interventions:   Would recommend re-establishing his p o  Medications at home dosing, CT brain to rule out PRES  Encourage compliance with medication regimens including antihypertensives and calcium supplementation  Re-evaluate blood pressure 2 hours post oral medication  If blood pressure improved and no signs of new end-organ damage, patient may be admitted to Medicine  Would suggest case management for medication assistance if needed           Triage Assessment:     Re-evaluate as above    Recommendations discussed with Dr Robe Westbrook and Dr Kaylen Lutz

## 2021-02-15 NOTE — ASSESSMENT & PLAN NOTE
· QT initially 526, decreased slightly and upon current recheck increased to 550  · Continue to monitor EKGs closely  · Patient given 1 dose IV magnesium 2 g in the ED  · Avoid QT prolonging agents such as Zofran  · Monitor electrolytes

## 2021-02-15 NOTE — UTILIZATION REVIEW
Initial Clinical Review    OBS order 2/15 0039 converted to IP on 2/15 @ 1515 for continued treatment of HTN crisis and CKD treatment     Level of Care Med Surg    Estimated length of stay More than 2 Midnights    Certification I certify that inpatient services are medically necessary for this patient for a duration of greater than two midnights  See H&P and MD Progress Notes for additional information about the patient's course of treatment  ED Arrival Information     Expected Arrival Acuity Means of Arrival Escorted By Service Admission Type    - 2/14/2021 14:26 Emergent Walk-In Self General Medicine Emergency    Arrival Complaint    Chest Pain        Chief Complaint   Patient presents with    Weakness - Generalized     Patient present to ED with co generalized weakness that started approximately 1 month ago  Patient states "I have pain in my shoulder that goes down my arm into my chest "      Assessment/Plan: 40 yo male to ED from home w/ L shoulder pain and CP   Resolved after trigger point injection in ED   In ED found to have elevated BP and hypocalcemia  , /124  Given multiple IV BP meds in ED w/o much improvement in BP   Admitted OBS status closely monitor BP , pen iv labetalol   Calcium 6 6, chronic issue , pt non compliant w/ medications , recheck in am after medication in ED , cont Calcitrol and consult nephrology for dialysis   CP serial trop , tele and consider cardiology consult   ESRD creat 17 86,  last dialysis was Fri , consult nephrology and monitor BMP       2/15 Nephrology consult   ESRD on HD   Dialyzed w/ 1K bath   Hypokalemia after parathyroidectomy being replaced  HTN better controled  2/15 IM Note   ESRD on HD , hyperkalemia , HTN crisis   Plan for HD today , nephrology consulted  likely from ESRD cont home meds and complete HD    CP likely musculoskeletal   ED Triage Vitals [02/14/21 1435]   Temperature Pulse Respirations Blood Pressure SpO2   98 4 °F (36 9 °C) 90 18 (!) 222/124 98 %      Temp Source Heart Rate Source Patient Position - Orthostatic VS BP Location FiO2 (%)   Oral Monitor Lying Left arm --      Pain Score       Worst Possible Pain          Wt Readings from Last 1 Encounters:   02/14/21 107 kg (236 lb 15 9 oz)     Additional Vital Signs:   02/15/21 1330  --  83  18  136/73  --  --  --  Lying   02/15/21 1300  --  83  18  136/73  --  --  --  Lying   02/15/21 1230  --  83  18  133/77  --  --  --  Lying   02/15/21 1225  --  82  18  127/53  70  --  --  Sitting   02/15/21 1218  97 8 °F (36 6 °C)  89  18  164/90  116             02/15/21 0550  --  --  --  --  --  95 %  None (Room air)  --   02/15/21 05:16:51  --  --  --  150/100  117  --  --  --   02/15/21 02:49:21  --  --  --  185/106Abnormal   132  --  --  --   02/15/21 01:46:54  98 4 °F (36 9 °C)  86  16  209/124Abnormal   152  97 %  None (Room air)  Lying   02/15/21 0115  --  78  15  198/107Abnormal   146  97 %  None (Room air)  Lying   02/15/21 0030  --  77  15  188/98Abnormal   135  96 %  None (Room air)  Lying   02/15/21 0000  --  79  15  196/107Abnormal   145  96 %  None (Room air)  Lying   02/14/21 2345  --  79  16  205/109Abnormal   149  95 %  None (Room air)  Lying   02/14/21 2246  --  80  20  227/112Abnormal   --  96 %  None (Room air)  Lying   02/14/21 2145  --  92  24Abnormal   195/101Abnormal   139  97 %  None (Room air)  Lying   02/14/21 2133  --  93  18  245/131Abnormal   --  97 %  None (Room air)  Sitting   02/14/21 2030  --  75  15  224/112Abnormal   155  98 %  None (Room air)  Lying   02/14/21 2004  --  --  --  --  --  --  None (Room air)  --   02/14/21 2000  --  77  16  198/108Abnormal   145  97 %  None (Room air)  Lying   02/14/21 1930  --  78  15  219/114Abnormal   159  96 %  None (Room air)  Lying   02/14/21 1900  --  77  16  236/118Abnormal   --  96 %  None (Room air)  Lying   02/14/21 1842  --  --  --  251/119Abnormal   --  --  --  Lying   02/14/21 1828  --  88  17  225/111Abnormal   --  97 % --  Lying   02/14/21 1800  --  82  17  259/124Abnormal   177  97 %  --  --   02/14/21 1700  --  82  22  228/116Abnormal   164  97 %  --  --   02/14/21 1658  --  --  --  240/114Abnormal   --  --  --  Lying   02/14/21 1615  --  82  16  218/126Abnormal   165  96 %  --         Pertinent Labs/Diagnostic Test Results:   2/14 EKG NSR   2/14 PCXR No acute pulmonary disease  2/14 CT head No acute intracranial hemorrhage, midline shift, or mass effect      Results from last 7 days   Lab Units 02/15/21  0415 02/14/21  1454   WBC Thousand/uL 5 77 4 58   HEMOGLOBIN g/dL 9 4* 9 7*   HEMATOCRIT % 31 0* 31 2*   PLATELETS Thousands/uL 234 244   NEUTROS ABS Thousands/µL  --  2 63     Results from last 7 days   Lab Units 02/15/21  0415 02/14/21  1454 02/10/21  1130   SODIUM mmol/L 133* 137  --    POTASSIUM mmol/L 6 3* 5 2  --    CHLORIDE mmol/L 92* 94*  --    CO2 mmol/L 20* 22  --    ANION GAP mmol/L 21* 21*  --    BUN mg/dL 124* 113*  --    CREATININE mg/dL 18 65* 17 86*  --    EGFR ml/min/1 73sq m 3 3  --    CALCIUM mg/dL 6 5* 6 6* 6 7*   CALCIUM, IONIZED mmol/L  --  0 74*  --    MAGNESIUM mg/dL 2 3  --   --      Results from last 7 days   Lab Units 02/15/21  0628 02/15/21  0513   POC GLUCOSE mg/dl 68 131     Results from last 7 days   Lab Units 02/15/21  0415 02/14/21  1454   GLUCOSE RANDOM mg/dL 119 114     Results from last 7 days   Lab Units 02/15/21  0404 02/15/21  0033 02/14/21  1454   TROPONIN I ng/mL <0 02 <0 02 0 02     ED Treatment:   Medication Administration from 02/14/2021 1425 to 02/15/2021 0139       Date/Time Order Dose Route Action     02/14/2021 1511 lidocaine (PF) (XYLOCAINE-MPF) 2 % injection 10 mL 10 mL Infiltration Given     02/14/2021 1513 magnesium sulfate 2 g/50 mL IVPB (premix) 2 g 2 g Intravenous New Bag     02/14/2021 1602 calcium gluconate 1 g in sodium chloride 0 9% 50 mL (premix) 1 g Intravenous New Bag     02/14/2021 1727 calcium gluconate 1 g in sodium chloride 0 9% 50 mL (premix) 1 g Intravenous New Bag     02/14/2021 1815 hydrALAZINE (APRESOLINE) injection 10 mg 10 mg Intravenous Given     02/14/2021 1846 metoprolol (LOPRESSOR) injection 5 mg 5 mg Intravenous Given     02/14/2021 1959 metoprolol (LOPRESSOR) injection 5 mg 5 mg Intravenous Given     02/14/2021 2142 Labetalol HCl (NORMODYNE) injection 15 mg 15 mg Intravenous Given     02/14/2021 2142 hydrALAZINE (APRESOLINE) tablet 75 mg 75 mg Oral Given     02/14/2021 2342 carvedilol (COREG) tablet 25 mg 25 mg Oral Given     02/15/2021 0012 NIFEdipine ER (ADALAT CC) 24 hr tablet 90 mg 90 mg Oral Given        Past Medical History:   Diagnosis Date    Chronic kidney disease     Hypertension     Pressure injury of skin     Renal disorder     dialysis      Present on Admission:   Hypertensive urgency   Chest pain   Shoulder pain   Acute congestive heart failure (HCC)   Hypocalcemia   QT prolongation      Admitting Diagnosis: Hypocalcemia [E83 51]  High blood pressure [I10]  Chest pain [R07 9]  Hypertension [I10]  Prolonged Q-T interval on ECG [R94 31]  ESRD (end stage renal disease) ([facility] [address] ) [N18 6]  Right shoulder pain [M25 511]  Generalized weakness [R53 1]  Age/Sex: 39 y o  male  Admission Orders:  Scheduled Medications:  calcitriol, 1 mcg, Oral, Daily  carvedilol, 25 mg, Oral, BID With Meals  heparin (porcine), 5,000 Units, Subcutaneous, Q8H JOSE  hydrALAZINE, 75 mg, Oral, Q8H [facility]  [START ON 2/16/2021] NIFEdipine, 90 mg, Oral, Daily      Continuous IV Infusions:     PRN Meds:  acetaminophen, 650 mg, Oral, Q6H PRN  aluminum-magnesium hydroxide-simethicone, 30 mL, Oral, Q6H PRN  Labetalol HCl, 10 mg, Intravenous, Q6H PRN 2/15 x1    Tele       IP CONSULT TO CASE MANAGEMENT  IP CONSULT TO NEPHROLOGY    Network Utilization Review Department  ATTENTION: Please call with any questions or concerns to [phone] and carefully listen to the prompts so that you are directed to the right person   All voicemails are confidential   Tamika Reyes all requests for admission clinical reviews, approved or denied determinations and any other requests to dedicated fax number below belonging to the campus where the patient is receiving treatment   List of dedicated fax numbers for the Facilities:  [address] [place] [address] DENIALS (Administrative/Medical Necessity) [phone]   [address] (Maternity/NICU/Pediatrics) [phone] [address] Sherly Andrews 5723 (  [facility]) [address] [place] [address] Svetlana Cameron Timmons [address] [place] [address] [phone]

## 2021-02-16 LAB
ALBUMIN SERPL BCP-MCNC: 2.9 G/DL (ref 3.5–5)
ALP SERPL-CCNC: 278 U/L (ref 46–116)
ALT SERPL W P-5'-P-CCNC: 16 U/L (ref 12–78)
ANION GAP SERPL CALCULATED.3IONS-SCNC: 20 MMOL/L (ref 4–13)
AST SERPL W P-5'-P-CCNC: 14 U/L (ref 5–45)
ATRIAL RATE: 81 BPM
ATRIAL RATE: 84 BPM
BASOPHILS # BLD AUTO: 0.01 THOUSANDS/ΜL (ref 0–0.1)
BASOPHILS NFR BLD AUTO: 0 % (ref 0–1)
BILIRUB SERPL-MCNC: 0.3 MG/DL (ref 0.2–1)
BUN SERPL-MCNC: 84 MG/DL (ref 5–25)
CALCIUM ALBUM COR SERPL-MCNC: 7.8 MG/DL (ref 8.3–10.1)
CALCIUM SERPL-MCNC: 6.9 MG/DL (ref 8.3–10.1)
CHLORIDE SERPL-SCNC: 92 MMOL/L (ref 100–108)
CK MB SERPL-MCNC: 1.2 NG/ML (ref 0–5)
CK MB SERPL-MCNC: <1 % (ref 0–2.5)
CK SERPL-CCNC: 178 U/L (ref 39–308)
CO2 SERPL-SCNC: 25 MMOL/L (ref 21–32)
CREAT SERPL-MCNC: 13.78 MG/DL (ref 0.6–1.3)
EOSINOPHIL # BLD AUTO: 0.12 THOUSAND/ΜL (ref 0–0.61)
EOSINOPHIL NFR BLD AUTO: 3 % (ref 0–6)
ERYTHROCYTE [DISTWIDTH] IN BLOOD BY AUTOMATED COUNT: 18.1 % (ref 11.6–15.1)
GFR SERPL CREATININE-BSD FRML MDRD: 4 ML/MIN/1.73SQ M
GLUCOSE SERPL-MCNC: 147 MG/DL (ref 65–140)
HCT VFR BLD AUTO: 28.3 % (ref 36.5–49.3)
HGB BLD-MCNC: 8.8 G/DL (ref 12–17)
IMM GRANULOCYTES # BLD AUTO: 0.05 THOUSAND/UL (ref 0–0.2)
IMM GRANULOCYTES NFR BLD AUTO: 1 % (ref 0–2)
LYMPHOCYTES # BLD AUTO: 1.03 THOUSANDS/ΜL (ref 0.6–4.47)
LYMPHOCYTES NFR BLD AUTO: 23 % (ref 14–44)
MAGNESIUM SERPL-MCNC: 2 MG/DL (ref 1.6–2.6)
MCH RBC QN AUTO: 25.5 PG (ref 26.8–34.3)
MCHC RBC AUTO-ENTMCNC: 31.1 G/DL (ref 31.4–37.4)
MCV RBC AUTO: 82 FL (ref 82–98)
MONOCYTES # BLD AUTO: 0.44 THOUSAND/ΜL (ref 0.17–1.22)
MONOCYTES NFR BLD AUTO: 10 % (ref 4–12)
NEUTROPHILS # BLD AUTO: 2.93 THOUSANDS/ΜL (ref 1.85–7.62)
NEUTS SEG NFR BLD AUTO: 63 % (ref 43–75)
NRBC BLD AUTO-RTO: 0 /100 WBCS
P AXIS: 57 DEGREES
P AXIS: 66 DEGREES
PLATELET # BLD AUTO: 262 THOUSANDS/UL (ref 149–390)
PMV BLD AUTO: 11.2 FL (ref 8.9–12.7)
POTASSIUM SERPL-SCNC: 4.1 MMOL/L (ref 3.5–5.3)
PR INTERVAL: 186 MS
PR INTERVAL: 188 MS
PROT SERPL-MCNC: 8 G/DL (ref 6.4–8.2)
QRS AXIS: -15 DEGREES
QRS AXIS: -34 DEGREES
QRSD INTERVAL: 102 MS
QRSD INTERVAL: 112 MS
QT INTERVAL: 448 MS
QT INTERVAL: 470 MS
QTC INTERVAL: 529 MS
QTC INTERVAL: 545 MS
RBC # BLD AUTO: 3.45 MILLION/UL (ref 3.88–5.62)
SODIUM SERPL-SCNC: 137 MMOL/L (ref 136–145)
T WAVE AXIS: 45 DEGREES
T WAVE AXIS: 48 DEGREES
VENTRICULAR RATE: 81 BPM
VENTRICULAR RATE: 84 BPM
WBC # BLD AUTO: 4.58 THOUSAND/UL (ref 4.31–10.16)

## 2021-02-16 PROCEDURE — 82553 CREATINE MB FRACTION: CPT | Performed by: INTERNAL MEDICINE

## 2021-02-16 PROCEDURE — 99232 SBSQ HOSP IP/OBS MODERATE 35: CPT | Performed by: INTERNAL MEDICINE

## 2021-02-16 PROCEDURE — 80053 COMPREHEN METABOLIC PANEL: CPT | Performed by: STUDENT IN AN ORGANIZED HEALTH CARE EDUCATION/TRAINING PROGRAM

## 2021-02-16 PROCEDURE — 93010 ELECTROCARDIOGRAM REPORT: CPT | Performed by: INTERNAL MEDICINE

## 2021-02-16 PROCEDURE — 82550 ASSAY OF CK (CPK): CPT | Performed by: INTERNAL MEDICINE

## 2021-02-16 PROCEDURE — 83735 ASSAY OF MAGNESIUM: CPT | Performed by: STUDENT IN AN ORGANIZED HEALTH CARE EDUCATION/TRAINING PROGRAM

## 2021-02-16 PROCEDURE — 85025 COMPLETE CBC W/AUTO DIFF WBC: CPT | Performed by: STUDENT IN AN ORGANIZED HEALTH CARE EDUCATION/TRAINING PROGRAM

## 2021-02-16 PROCEDURE — 99233 SBSQ HOSP IP/OBS HIGH 50: CPT | Performed by: INTERNAL MEDICINE

## 2021-02-16 RX ORDER — TRAMADOL HYDROCHLORIDE 50 MG/1
50 TABLET ORAL 2 TIMES DAILY PRN
Status: DISCONTINUED | OUTPATIENT
Start: 2021-02-16 | End: 2021-02-16

## 2021-02-16 RX ORDER — CALCIUM GLUCONATE 20 MG/ML
1 INJECTION, SOLUTION INTRAVENOUS ONCE
Status: COMPLETED | OUTPATIENT
Start: 2021-02-16 | End: 2021-02-16

## 2021-02-16 RX ORDER — HYDROMORPHONE HCL/PF 1 MG/ML
1 SYRINGE (ML) INJECTION ONCE
Status: COMPLETED | OUTPATIENT
Start: 2021-02-16 | End: 2021-02-16

## 2021-02-16 RX ORDER — HYDROMORPHONE HCL/PF 1 MG/ML
0.5 SYRINGE (ML) INJECTION ONCE
Status: DISCONTINUED | OUTPATIENT
Start: 2021-02-16 | End: 2021-02-16

## 2021-02-16 RX ORDER — CALCIUM CARBONATE 1250 MG/5ML
1250 SUSPENSION ORAL
Status: DISCONTINUED | OUTPATIENT
Start: 2021-02-16 | End: 2021-02-18 | Stop reason: HOSPADM

## 2021-02-16 RX ORDER — TRAMADOL HYDROCHLORIDE 50 MG/1
50 TABLET ORAL EVERY 8 HOURS PRN
Status: DISCONTINUED | OUTPATIENT
Start: 2021-02-16 | End: 2021-02-17

## 2021-02-16 RX ADMIN — TRAMADOL HYDROCHLORIDE 50 MG: 50 TABLET, FILM COATED ORAL at 22:27

## 2021-02-16 RX ADMIN — TRAMADOL HYDROCHLORIDE 50 MG: 50 TABLET, FILM COATED ORAL at 12:42

## 2021-02-16 RX ADMIN — ACETAMINOPHEN 650 MG: 325 TABLET ORAL at 03:06

## 2021-02-16 RX ADMIN — HYDROMORPHONE HYDROCHLORIDE 1 MG: 1 INJECTION, SOLUTION INTRAMUSCULAR; INTRAVENOUS; SUBCUTANEOUS at 08:39

## 2021-02-16 RX ADMIN — CARVEDILOL 25 MG: 12.5 TABLET, FILM COATED ORAL at 17:18

## 2021-02-16 RX ADMIN — CALCIUM GLUCONATE 1 G: 20 INJECTION, SOLUTION INTRAVENOUS at 14:31

## 2021-02-16 RX ADMIN — CALCITRIOL CAPSULES 0.25 MCG 1 MCG: 0.25 CAPSULE ORAL at 08:39

## 2021-02-16 RX ADMIN — CALCIUM CARBONATE 1250 MG: 1250 SUSPENSION ORAL at 17:19

## 2021-02-16 RX ADMIN — NIFEDIPINE 90 MG: 30 TABLET, FILM COATED, EXTENDED RELEASE ORAL at 06:13

## 2021-02-16 RX ADMIN — HYDRALAZINE HYDROCHLORIDE 75 MG: 25 TABLET, FILM COATED ORAL at 22:19

## 2021-02-16 RX ADMIN — CALCIUM CARBONATE 1250 MG: 1250 SUSPENSION ORAL at 12:43

## 2021-02-16 RX ADMIN — HYDRALAZINE HYDROCHLORIDE 75 MG: 25 TABLET, FILM COATED ORAL at 14:02

## 2021-02-16 RX ADMIN — HYDRALAZINE HYDROCHLORIDE 75 MG: 25 TABLET, FILM COATED ORAL at 06:13

## 2021-02-16 RX ADMIN — CARVEDILOL 25 MG: 12.5 TABLET, FILM COATED ORAL at 08:38

## 2021-02-16 NOTE — ASSESSMENT & PLAN NOTE
· Initial report of left shoulder pain which has since resolved status post trigger point injection  · Follow-up outpatient with PCP  · Patient also has cervical stenosis  , outpatient follow-up PCP/neurosurgery if does not resolve

## 2021-02-16 NOTE — ASSESSMENT & PLAN NOTE
· Initially on arrival complaining of left shoulder pain radiation with chest pain as well  · Upon evaluation patient denies any chest pain reports that did resolve quickly upon arrival several hours ago  · SUMMER 1  · Serial troponin negative, ACS ruled out    · Chest pain resolved now

## 2021-02-16 NOTE — ASSESSMENT & PLAN NOTE
Lab Results   Component Value Date    EGFR 4 02/16/2021    EGFR 3 02/15/2021    EGFR 3 02/15/2021    CREATININE 13 78 (H) 02/16/2021    CREATININE 18 78 (H) 02/15/2021    CREATININE 18 65 (H) 02/15/2021   · Hemoglobin currently 9 7  · Does appear slightly above baseline which is around 8-9  · No active bleeding  · Monitor CBC

## 2021-02-16 NOTE — ASSESSMENT & PLAN NOTE
· Patient presented to ED for left shoulder pain and radiation to chest  · Incidentally noted elevated blood pressure initially 222/124  · In setting of noncompliance as patient reports he has not been taking his home medications  · Despite multiple IV hypertension medications no real change in blood pressure from over 200s over 100s  · Seen by critical care for evaluation suggesting restarting home medications and rechecking blood pressure 2 hours later  · CT head negative  · Chest x-ray revealing AICD in place  · EKG x2 revealing normal sinus rhythm, prolonged QTC  · After receiving p o  Coreg 25 mg blood pressure did decrease a little bit to 188/98  · Upon approach remaining in 190s over 100  · Patient also given p o  Nifedipine 90 mg and p o  Hydralazine 75 mg in the ED  · Will continue to closely monitor blood pressure q 1 hour x6 hours then q 2 hours for now  · Will resume home meds including Coreg 25 mg p o  B i d , hydralazine 75 mg p o  Q 8 hours, nifedipine 90 mg p o  Q d  · Educated on importance of compliance with medication outpatient  · P r n  IV labetalol 10 mg q 6 hours also added with parameters  · No sign of end-organ damage at this time, patient without complaints, heart rate stable  · Case management consult for help with medication for discharge  · Blood pressure better controlled now

## 2021-02-16 NOTE — PROGRESS NOTES
NEPHROLOGY PROGRESS NOTE    Patient: Cruzito Seay               Sex: male          DOA: 2/14/2021  2:30 PM   YOB: 1975        Age:  39 y o         LOS:  LOS: 1 day       HPI     Patient with ESRD on home dialysis admitted with uncontrolled blood pressure and muscle pain    SUBJECTIVE     Had dialysis yesterday and tolerated well    Still complaining of pain in both upper extremity around the shoulder along with weakness    No chest pain no palpitation    Denies any shortness of breath    CURRENT MEDICATIONS       Current Facility-Administered Medications:     acetaminophen (TYLENOL) tablet 650 mg, 650 mg, Oral, Q6H PRN, Milroy Yuni, PA-C, 650 mg at 02/16/21 0306    aluminum-magnesium hydroxide-simethicone (MYLANTA) oral suspension 30 mL, 30 mL, Oral, Q6H PRN, Berna Morrissey PA-C    calcitriol (ROCALTROL) capsule 1 mcg, 1 mcg, Oral, Daily, Berna Yuni, PA-C, 1 mcg at 02/16/21 1421    carvedilol (COREG) tablet 25 mg, 25 mg, Oral, BID With Meals, Berna Morrissey, PA-C, 25 mg at 02/16/21 0838    heparin (porcine) subcutaneous injection 5,000 Units, 5,000 Units, Subcutaneous, Q8H [facility] **AND** [CANCELED] Platelet count, , , Once, Berna Morrissey, PA-C    hydrALAZINE (APRESOLINE) tablet 75 mg, 75 mg, Oral, Q8H [facility], Amy Cornell PA-C, 75 mg at 02/16/21 0057    Labetalol HCl (NORMODYNE) injection 10 mg, 10 mg, Intravenous, Q6H PRN, Berna Morrissey, PA-C, 10 mg at 02/15/21 0157    NIFEdipine (PROCARDIA XL) 24 hr tablet 90 mg, 90 mg, Oral, Daily, Milroyaby Morrissey, PA-C, 90 mg at 02/16/21 9238    traMADol (ULTRAM) tablet 50 mg, 50 mg, Oral, BID PRN, Elena Zepeda MD    OBJECTIVE     Current Weight: Weight - Scale: 107 kg (236 lb 15 9 oz)  Vitals:    02/16/21 0715   BP: 160/96   Pulse: 98   Resp: 17   Temp: 98 5 °F (36 9 °C)   SpO2:        Intake/Output Summary (Last 24 hours) at 2/16/2021 1029  Last data filed at 2/16/2021 0954  Gross per 24 hour   Intake 1190 ml   Output 1950 ml   Net -760 ml PHYSICAL EXAMINATION     Physical Exam  Constitutional:       General: He is not in acute distress  Appearance: He is well-developed  HENT:      Head: Normocephalic  Eyes:      General: No scleral icterus  Conjunctiva/sclera: Conjunctivae normal    Neck:      Musculoskeletal: Neck supple  Vascular: No JVD  Cardiovascular:      Rate and Rhythm: Normal rate  Heart sounds: Normal heart sounds  Pulmonary:      Effort: Pulmonary effort is normal       Breath sounds: No wheezing  Abdominal:      Palpations: Abdomen is soft  Tenderness: There is no abdominal tenderness  Musculoskeletal: Normal range of motion  Skin:     General: Skin is warm  Findings: No rash  Neurological:      Mental Status: He is alert and oriented to person, place, and time  Psychiatric:         Behavior: Behavior normal           LAB RESULTS     Results from last 7 days   Lab Units 02/16/21  0435 02/15/21  0933 02/15/21  0415 02/14/21  1454 02/10/21  1130   WBC Thousand/uL 4 58  --  5 77 4 58  --    HEMOGLOBIN g/dL 8 8*  --  9 4* 9 7*  --    HEMATOCRIT % 28 3*  --  31 0* 31 2*  --    PLATELETS Thousands/uL 262  --  234 244  --    POTASSIUM mmol/L 4 1 6 2* 6 3* 5 2  --    CHLORIDE mmol/L 92* 92* 92* 94*  --    CO2 mmol/L 25 17* 20* 22  --    BUN mg/dL 84* 123* 124* 113*  --    CREATININE mg/dL 13 78* 18 78* 18 65* 17 86*  --    EGFR ml/min/1 73sq m 4 3 3 3  --    CALCIUM mg/dL 6 9* 6 6* 6 5* 6 6* 6 7*   MAGNESIUM mg/dL 2 0  --  2 3  --   --        RADIOLOGY RESULTS      Results for orders placed during the hospital encounter of 02/14/21   XR chest 1 view portable    Narrative CHEST     INDICATION:   weakness  COMPARISON:  1/27/2021    EXAM PERFORMED/VIEWS:  XR CHEST PORTABLE      FINDINGS:  ICD transvenous pacemaker present as on previous examination  The cardiac silhouette is without change from the prior study  The lungs are clear  No pneumothorax or pleural effusion      Osseous structures appear within normal limits for patient age  Impression No acute pulmonary disease            Workstation performed: [id number]       Results for orders placed during the hospital encounter of 01/27/21   XR chest 2 views    Narrative CHEST     INDICATION:  Neck and back pain  COMPARISON:  10/25/2020, CT chest 7/3/2020    EXAM PERFORMED/VIEWS:  XR CHEST AP & LATERAL    FINDINGS:    Cardiomediastinal silhouette appears stable  Left chest wall AICD device  The lungs are clear  No pneumothorax or pleural effusion  Suggestive evidence of increased bony density associated with history of end-stage renal disease  Impression No acute cardiopulmonary disease  Workstation performed: [id number]       Results for orders placed during the hospital encounter of 07/03/20   CT chest without contrast    Narrative CT CHEST WITHOUT IV CONTRAST    INDICATION:   sob  COMPARISON:  None  TECHNIQUE: CT examination of the chest was performed without intravenous contrast   Axial, sagittal, and coronal 2D reformatted images were created from the source data and submitted for interpretation  Radiation dose length product (DLP) for this visit:  278 mGy-cm   This examination, like all CT scans performed in the [facility], was performed utilizing techniques to minimize radiation dose exposure, including the use of iterative   reconstruction and automated exposure control  FINDINGS:    LUNGS:  The trachea and central bronchial tree are patent  Diffuse airspace opacities are seen throughout the lungs (left greater than right)  PLEURA:  Trace left-sided pleural effusion is seen  HEART/GREAT VESSELS:  The heart is enlarged  Trace pericardial effusion is seen  MEDIASTINUM AND MAXIMILIAN:  Mediastinal lymph nodes measuring up to 1 3 cm are visualized    Limited evaluation the hilar region due to lack of intravenous contrast     CHEST WALL AND LOWER NECK:   Left-sided central venous catheter is seen  VISUALIZED STRUCTURES IN THE UPPER ABDOMEN:  Unremarkable  OSSEOUS STRUCTURES:  Increased sclerosis of the osseous structures is seen  Impression Diffuse airspace opacities throughout the lungs which may represent infection versus pulmonary edema  Recommend short-term follow-up CT scan of the chest to evaluate for resolution in 3 months  The study was marked in EPIC for significant notification  Workstation performed: [id number]       No results found for this or any previous visit  No results found for this or any previous visit  No results found for this or any previous visit  PLAN / RECOMMENDATIONS      ESRD:  Had dialysis yesterday will dialyze him tomorrow    Hypocalcemia:  After parathyroidectomy  Will continue the supplement calcium at this point    Upper extremity pain:  Seems to be muscular pain  May be rhabdomyolysis due to hypocalcemia  Will check the CPK    Hypertension:  Getting reasonably well control  Will continue to monitor          Flavio Dawn MD  Nephrology  2/16/2021        Portions of the record may have been created with voice recognition software  Occasional wrong word or "sound a like" substitutions may have occurred due to the inherent limitations of voice recognition software  Read the chart carefully and recognize, using context, where substitutions have occurred

## 2021-02-16 NOTE — ASSESSMENT & PLAN NOTE
Lab Results   Component Value Date    EGFR 4 02/16/2021    EGFR 3 02/15/2021    EGFR 3 02/15/2021    CREATININE 13 78 (H) 02/16/2021    CREATININE 18 78 (H) 02/15/2021    CREATININE 18 65 (H) 02/15/2021   · Creatinine currently 17 86,   · Baseline per review chart around 14-15  · Last dialysis session on Friday, due for dialysis today 2/15  · Appreciate nephrology consult for dialysis  · Monitor BMP  · Next dialysis tomorrow

## 2021-02-16 NOTE — UTILIZATION REVIEW
Notification of Inpatient Admission/Inpatient Authorization Request   This is a Notification of Inpatient Admission for [address]  Be advised that this patient was admitted to our facility under Inpatient Status  Contact Sera Becerra at [phone] for additional admission information  [address] [facility] DEPT DEDICATED Ruddy Ballesteroscecilia [phone]  Patient Name:   Swetha Dey   YOB: 1975       [address]   [address]:   [address]   Tax ID: [id number]  NPI: [id number] Attending Provider/NPI:  Phone:  Address: Erika Ledesma, Marisol De Anda [[id number]]  [phone]  Same as JONATHAN/Nicole Sanfrod 1106 of Service Code: 24     [facility]   Start Date: 2/15/21 1515 Discharge Date & Time: No discharge date for patient encounter  Type of Admission: Inpatient Status Discharge Disposition   (if discharged): Home/Self Care   Patient Diagnoses: Hypocalcemia [E83 51]  High blood pressure [I10]  Chest pain [R07 9]  Hypertension [I10]  Prolonged Q-T interval on ECG [R94 31]  ESRD (end stage renal disease) ([facility] [address] ) [N18 6]  Right shoulder pain [M25 511]  Generalized weakness [R53 1]     Orders: Admission Orders (From admission, onward)     Ordered        02/15/21 1515  Inpatient Admission  Once         02/15/21 0039  Place in Observation  Once                    Assigned Utilization Review Contact: Sera Becerra  Utilization   Network Utilization Review Department  Phone: [phone]; Fax [phone]  Email: [email or web address]   ATTENTION PAYERS: Please call the assigned Utilization  directly with any questions or concerns ALL voicemails in the department are confidential  Send all requests for admission clinical reviews, approved or denied determinations and any other requests to dedicated fax number belonging to the campus where the patient is receiving treatment

## 2021-02-16 NOTE — PLAN OF CARE
Problem: Potential for Falls  Goal: Patient will remain free of falls  Description: INTERVENTIONS:  - Assess patient frequently for physical needs  -  Identify cognitive and physical deficits and behaviors that affect risk of falls    -  [place] fall precautions as indicated by assessment   - Educate patient/family on patient safety including physical limitations  - Instruct patient to call for assistance with activity based on assessment  - Modify environment to reduce risk of injury  - Consider OT/PT consult to assist with strengthening/mobility  Outcome: Progressing     Problem: PAIN - ADULT  Goal: Verbalizes/displays adequate comfort level or baseline comfort level  Description: Interventions:  - Encourage patient to monitor pain and request assistance  - Assess pain using appropriate pain scale  - Administer analgesics based on type and severity of pain and evaluate response  - Implement non-pharmacological measures as appropriate and evaluate response  - Consider cultural and social influences on pain and pain management  - Notify physician/advanced practitioner if interventions unsuccessful or patient reports new pain  Outcome: Progressing     Problem: INFECTION - ADULT  Goal: Absence or prevention of progression during hospitalization  Description: INTERVENTIONS:  - Assess and monitor for signs and symptoms of infection  - Monitor lab/diagnostic results  - Monitor all insertion sites, i e  indwelling lines, tubes, and drains  - Monitor endotracheal if appropriate and nasal secretions for changes in amount and color  - [place] appropriate cooling/warming therapies per order  - Administer medications as ordered  - Instruct and encourage patient and family to use good hand hygiene technique  - Identify and instruct in appropriate isolation precautions for identified infection/condition  Outcome: Progressing  Goal: Absence of fever/infection during neutropenic period  Description: INTERVENTIONS:  - Monitor WBC    Outcome: Progressing     Problem: SAFETY ADULT  Goal: Patient will remain free of falls  Description: INTERVENTIONS:  - Assess patient frequently for physical needs  -  Identify cognitive and physical deficits and behaviors that affect risk of falls    -  [place] fall precautions as indicated by assessment   - Educate patient/family on patient safety including physical limitations  - Instruct patient to call for assistance with activity based on assessment  - Modify environment to reduce risk of injury  - Consider OT/PT consult to assist with strengthening/mobility  Outcome: Progressing  Goal: Maintain or return to baseline ADL function  Description: INTERVENTIONS:  -  Assess patient's ability to carry out ADLs; assess patient's baseline for ADL function and identify physical deficits which impact ability to perform ADLs (bathing, care of mouth/teeth, toileting, grooming, dressing, etc )  - Assess/evaluate cause of self-care deficits   - Assess range of motion  - Assess patient's mobility; develop plan if impaired  - Assess patient's need for assistive devices and provide as appropriate  - Encourage maximum independence but intervene and supervise when necessary  - Involve family in performance of ADLs  - Assess for home care needs following discharge   - Consider OT consult to assist with ADL evaluation and planning for discharge  - Provide patient education as appropriate  Outcome: Progressing  Goal: Maintain or return mobility status to optimal level  Description: INTERVENTIONS:  - Assess patient's baseline mobility status (ambulation, transfers, stairs, etc )    - Identify cognitive and physical deficits and behaviors that affect mobility  - Identify mobility aids required to assist with transfers and/or ambulation (gait belt, sit-to-stand, lift, walker, cane, etc )  - [place] fall precautions as indicated by assessment  - Record patient progress and toleration of activity level on Mobility SBAR; progress patient to next Phase/Stage  - Instruct patient to call for assistance with activity based on assessment  - Consider rehabilitation consult to assist with strengthening/weightbearing, etc   Outcome: Progressing     Problem: DISCHARGE PLANNING  Goal: Discharge to home or other facility with appropriate resources  Description: INTERVENTIONS:  - Identify barriers to discharge w/patient and caregiver  - Arrange for needed discharge resources and transportation as appropriate  - Identify discharge learning needs (meds, wound care, etc )  - Arrange for interpretive services to assist at discharge as needed  - Refer to Case Management Department for coordinating discharge planning if the patient needs post-hospital services based on physician/advanced practitioner order or complex needs related to functional status, cognitive ability, or social support system  Outcome: Progressing     Problem: Knowledge Deficit  Goal: Patient/family/caregiver demonstrates understanding of disease process, treatment plan, medications, and discharge instructions  Description: Complete learning assessment and assess knowledge base    Interventions:  - Provide teaching at level of understanding  - Provide teaching via preferred learning methods  Outcome: Progressing     Problem: CARDIOVASCULAR - ADULT  Goal: Maintains optimal cardiac output and hemodynamic stability  Description: INTERVENTIONS:  - Monitor I/O, vital signs and rhythm  - Monitor for S/S and trends of decreased cardiac output  - Administer and titrate ordered vasoactive medications to optimize hemodynamic stability  - Assess quality of pulses, skin color and temperature  - Assess for signs of decreased coronary artery perfusion  - Instruct patient to report change in severity of symptoms  Outcome: Progressing  Goal: Absence of cardiac dysrhythmias or at baseline rhythm  Description: INTERVENTIONS:  - Continuous cardiac monitoring, vital signs, obtain 12 lead EKG if ordered  - Administer antiarrhythmic and heart rate control medications as ordered  - Monitor electrolytes and administer replacement therapy as ordered  Outcome: Progressing     Problem: METABOLIC, FLUID AND ELECTROLYTES - ADULT  Goal: Electrolytes maintained within normal limits  Description: INTERVENTIONS:  - Monitor labs and assess patient for signs and symptoms of electrolyte imbalances  - Administer electrolyte replacement as ordered  - Monitor response to electrolyte replacements, including repeat lab results as appropriate  - Instruct patient on fluid and nutrition as appropriate  Outcome: Progressing  Goal: Fluid balance maintained  Description: INTERVENTIONS:  - Monitor labs   - Monitor I/O and WT  - Instruct patient on fluid and nutrition as appropriate  - Assess for signs & symptoms of volume excess or deficit  Outcome: Progressing  Goal: Glucose maintained within target range  Description: INTERVENTIONS:  - Monitor Blood Glucose as ordered  - Assess for signs and symptoms of hyperglycemia and hypoglycemia  - Administer ordered medications to maintain glucose within target range  - Assess nutritional intake and initiate nutrition service referral as needed  Outcome: Progressing     Problem: HEMATOLOGIC - ADULT  Goal: Maintains hematologic stability  Description: INTERVENTIONS  - Assess for signs and symptoms of bleeding or hemorrhage  - Monitor labs  - Administer supportive blood products/factors as ordered and appropriate  Outcome: Progressing

## 2021-02-16 NOTE — CASE MANAGEMENT
NIMA spoke with the pt and his dtr at bedside and informed them that the pt has been accepted by [facility] [place] and the pt dtr asked if the pt could have nursing only  She states that she works in hospice and believes that the pt would only need nursing to check his vitals  CM updated the referral to [facility]  The pt anticipated dc will be tomorrow

## 2021-02-16 NOTE — PROGRESS NOTES
Progress Note - Yisselrinku Narda 1975, 39 y o  male MRN: [id number]    Unit/Bed#: -01 Encounter: [id number]    Primary Care Provider: Afsaneh Mitchell DO   Date and time admitted to hospital: 2/14/2021  2:30 PM        QT prolongation  Assessment & Plan  · QT initially 526  · Avoid QT prolongation urgent    Shoulder pain  Assessment & Plan  · Initial report of left shoulder pain which has since resolved status post trigger point injection  · Follow-up outpatient with PCP  · Patient also has cervical stenosis  , outpatient follow-up PCP/neurosurgery if does not resolve    Chest pain  Assessment & Plan  · Initially on arrival complaining of left shoulder pain radiation with chest pain as well  · Upon evaluation patient denies any chest pain reports that did resolve quickly upon arrival several hours ago  · SUMMER 1  · Serial troponin negative, ACS ruled out  · Chest pain resolved now    Anemia due to chronic kidney disease, on chronic dialysis [facility])  Assessment & Plan  Lab Results   Component Value Date    EGFR 4 02/16/2021    EGFR 3 02/15/2021    EGFR 3 02/15/2021    CREATININE 13 78 (H) 02/16/2021    CREATININE 18 78 (H) 02/15/2021    CREATININE 18 65 (H) 02/15/2021   · Hemoglobin currently 9 7  · Does appear slightly above baseline which is around 8-9  · No active bleeding  · Monitor CBC    Hypocalcemia  Assessment & Plan  · On BMP 6 6, ionized calcium 0 74 on admission  · Chronic issue and patient does report noncompliance with medication at home  · Two doses IV gluconate 1 g given in ED along with 1 dose IV magnesium  · Slightly improved  6 9 today  Continue calcium supplement  · Patient still complaining about spasm of his both shoulder/upper extremity  · Will give 1 more dose of IV calcium gluconate  Tramadol for pain control  P r n   Flexeril    End-stage renal disease on hemodialysis  Assessment & Plan  Lab Results   Component Value Date    EGFR 4 02/16/2021    EGFR 3 02/15/2021    EGFR 3 02/15/2021    CREATININE 13 78 (H) 02/16/2021    CREATININE 18 78 (H) 02/15/2021    CREATININE 18 65 (H) 02/15/2021   · Creatinine currently 17 86,   · Baseline per review chart around 14-15  · Last dialysis session on Friday, due for dialysis today 2/15  · Appreciate nephrology consult for dialysis  · Monitor BMP  · Next dialysis tomorrow    Acute congestive heart failure ([address] )  Assessment & Plan  Wt Readings from Last 3 Encounters:   02/14/21 107 kg (236 lb 15 9 oz)   01/26/21 99 8 kg (220 lb 0 3 oz)   01/11/21 99 8 kg (220 lb)   · No signs of fluid overload at this time  · Cardiac diet  · Continue home Coreg          * Hypertensive urgency  Assessment & Plan  · Patient presented to ED for left shoulder pain and radiation to chest  · Incidentally noted elevated blood pressure initially 222/124  · In setting of noncompliance as patient reports he has not been taking his home medications  · Despite multiple IV hypertension medications no real change in blood pressure from over 200s over 100s  · Seen by critical care for evaluation suggesting restarting home medications and rechecking blood pressure 2 hours later  · CT head negative  · Chest x-ray revealing AICD in place  · EKG x2 revealing normal sinus rhythm, prolonged QTC  · After receiving p o  Coreg 25 mg blood pressure did decrease a little bit to 188/98  · Upon approach remaining in 190s over 100  · Patient also given p o  Nifedipine 90 mg and p o  Hydralazine 75 mg in the ED  · Will continue to closely monitor blood pressure q 1 hour x6 hours then q 2 hours for now  · Will resume home meds including Coreg 25 mg p o  B i d , hydralazine 75 mg p o  Q 8 hours, nifedipine 90 mg p o  Q d    · Educated on importance of compliance with medication outpatient  · P r n  IV labetalol 10 mg q 6 hours also added with parameters  · No sign of end-organ damage at this time, patient without complaints, heart rate stable  · Case management consult for help with medication for discharge  · Blood pressure better controlled now  VTE Pharmacologic Prophylaxis:   Pharmacologic: Heparin  Mechanical VTE Prophylaxis in Place: Yes    Patient Centered Rounds: I have performed bedside rounds with nursing staff today  Discussions with Specialists or Other Care Team Provider:  Nephrology    Education and Discussions with Family / Patient:  Patient, wife bedside    Time Spent for Care: More than 50% of total time spent on counseling and coordination of care as described above  Current Length of Stay: 1 day(s)    Current Patient Status: Inpatient   Certification Statement: The patient will continue to require additional inpatient hospital stay due to See above    Discharge Plan:  24-48 hours    Code Status: Level 1 - Full Code      Subjective:   Have seen and examined the patient bedside this morning  Patient still complaining about bilateral shoulder stiffness  Denies any focal weakness  No chest pain or shortness of breath  Objective:     Vitals:   Temp (24hrs), Av 3 °F (36 8 °C), Min:97 6 °F (36 4 °C), Max:98 5 °F (36 9 °C)    Temp:  [97 6 °F (36 4 °C)-98 5 °F (36 9 °C)] 98 4 °F (36 9 °C)  HR:  [84-98] 84  Resp:  [17-18] 18  BP: (131-160)/(72-96) 139/81  SpO2:  [90 %-93 %] 90 %  Body mass index is 34 01 kg/m²  Input and Output Summary (last 24 hours):        Intake/Output Summary (Last 24 hours) at 2021 1531  Last data filed at 2021 1415  Gross per 24 hour   Intake 240 ml   Output 750 ml   Net -510 ml       Physical Exam:     Physical Exam  General- Awake, alert and oriented x 3, looks comfortable  HEENT- Normocephalic, atraumatic  CVS- Normal S1/ S2, Regular rate and rhythm  Respiratory system- B/L clear breath sounds  Abdomen- Soft, Non distended, no tenderness, Bowel sound- present  CNS- No acute focal neurologic deficit noted    Additional Data:     Labs:    Results from last 7 days   Lab Units 21  0435   WBC Thousand/uL 4 58   HEMOGLOBIN g/dL 8 8*   HEMATOCRIT % 28 3*   PLATELETS Thousands/uL 262   NEUTROS PCT % 63   LYMPHS PCT % 23   MONOS PCT % 10   EOS PCT % 3     Results from last 7 days   Lab Units 02/16/21  0435   SODIUM mmol/L 137   POTASSIUM mmol/L 4 1   CHLORIDE mmol/L 92*   CO2 mmol/L 25   BUN mg/dL 84*   CREATININE mg/dL 13 78*   ANION GAP mmol/L 20*   CALCIUM mg/dL 6 9*   ALBUMIN g/dL 2 9*   TOTAL BILIRUBIN mg/dL 0 30   ALK PHOS U/L 278*   ALT U/L 16   AST U/L 14   GLUCOSE RANDOM mg/dL 147*         Results from last 7 days   Lab Units 02/15/21  0628 02/15/21  0513   POC GLUCOSE mg/dl 68 131                   * I Have Reviewed All Lab Data Listed Above  * Additional Pertinent Lab Tests Reviewed: All Labs Within Last 24 Hours Reviewed    Imaging:    Imaging Reports Reviewed Today Include:   Imaging Personally Reviewed by Myself Includes:      Recent Cultures (last 7 days):           Last 24 Hours Medication List:   Current Facility-Administered Medications   Medication Dose Route Frequency Provider Last Rate    acetaminophen  650 mg Oral Q6H PRN Mario Spotted, PA-C      aluminum-magnesium hydroxide-simethicone  30 mL Oral Q6H PRN Mario Spotted, PA-C      calcitriol  1 mcg Oral Daily Mario Spotted, PA-C      Calcium Carbonate Antacid  1,250 mg Oral TID With Meals Mirna Cowden, MD      carvedilol  25 mg Oral BID With Meals Mario Spotted, PA-C      heparin (porcine)  5,000 Units Subcutaneous Q8H [address] Mario Spotted, PA-C      hydrALAZINE  75 mg Oral Q8H [address] Mario Spotted, PA-C      Labetalol HCl  10 mg Intravenous Q6H PRN Mario Spotted, PA-C      NIFEdipine  90 mg Oral Daily Mario Spotted, PA-C      traMADol  50 mg Oral Q8H PRN Homero Miller MD          Today, Patient Was Seen By: Courtney Alejandro MD    ** Please Note: Dictation voice to text software may have been used in the creation of this document   **

## 2021-02-16 NOTE — ASSESSMENT & PLAN NOTE
· On BMP 6 6, ionized calcium 0 74 on admission  · Chronic issue and patient does report noncompliance with medication at home  · Two doses IV gluconate 1 g given in ED along with 1 dose IV magnesium  · Slightly improved  6 9 today  Continue calcium supplement  · Patient still complaining about spasm of his both shoulder/upper extremity  · Will give 1 more dose of IV calcium gluconate  Tramadol for pain control  P r n   Flexeril

## 2021-02-17 ENCOUNTER — APPOINTMENT (INPATIENT)
Dept: RADIOLOGY | Facility: HOSPITAL | Age: 46
DRG: 194 | End: 2021-02-17
Payer: COMMERCIAL

## 2021-02-17 ENCOUNTER — APPOINTMENT (INPATIENT)
Dept: DIALYSIS | Facility: HOSPITAL | Age: 46
DRG: 194 | End: 2021-02-17
Payer: COMMERCIAL

## 2021-02-17 LAB
ALBUMIN SERPL BCP-MCNC: 2.8 G/DL (ref 3.5–5)
ALP SERPL-CCNC: 276 U/L (ref 46–116)
ALT SERPL W P-5'-P-CCNC: 15 U/L (ref 12–78)
ANION GAP SERPL CALCULATED.3IONS-SCNC: 19 MMOL/L (ref 4–13)
ANION GAP SERPL CALCULATED.3IONS-SCNC: 23 MMOL/L (ref 4–13)
AST SERPL W P-5'-P-CCNC: 12 U/L (ref 5–45)
BASOPHILS # BLD AUTO: 0.02 THOUSANDS/ΜL (ref 0–0.1)
BASOPHILS NFR BLD AUTO: 0 % (ref 0–1)
BILIRUB SERPL-MCNC: 0.3 MG/DL (ref 0.2–1)
BUN SERPL-MCNC: 66 MG/DL (ref 5–25)
BUN SERPL-MCNC: 98 MG/DL (ref 5–25)
CALCIUM ALBUM COR SERPL-MCNC: 7.6 MG/DL (ref 8.3–10.1)
CALCIUM SERPL-MCNC: 6.6 MG/DL (ref 8.3–10.1)
CALCIUM SERPL-MCNC: 7.4 MG/DL (ref 8.3–10.1)
CHLORIDE SERPL-SCNC: 90 MMOL/L (ref 100–108)
CHLORIDE SERPL-SCNC: 93 MMOL/L (ref 100–108)
CO2 SERPL-SCNC: 23 MMOL/L (ref 21–32)
CO2 SERPL-SCNC: 26 MMOL/L (ref 21–32)
CREAT SERPL-MCNC: 10.99 MG/DL (ref 0.6–1.3)
CREAT SERPL-MCNC: 16.9 MG/DL (ref 0.6–1.3)
EOSINOPHIL # BLD AUTO: 0.18 THOUSAND/ΜL (ref 0–0.61)
EOSINOPHIL NFR BLD AUTO: 3 % (ref 0–6)
ERYTHROCYTE [DISTWIDTH] IN BLOOD BY AUTOMATED COUNT: 17.9 % (ref 11.6–15.1)
GFR SERPL CREATININE-BSD FRML MDRD: 3 ML/MIN/1.73SQ M
GFR SERPL CREATININE-BSD FRML MDRD: 6 ML/MIN/1.73SQ M
GLUCOSE SERPL-MCNC: 129 MG/DL (ref 65–140)
GLUCOSE SERPL-MCNC: 171 MG/DL (ref 65–140)
HCT VFR BLD AUTO: 29 % (ref 36.5–49.3)
HGB BLD-MCNC: 9.1 G/DL (ref 12–17)
IMM GRANULOCYTES # BLD AUTO: 0.06 THOUSAND/UL (ref 0–0.2)
IMM GRANULOCYTES NFR BLD AUTO: 1 % (ref 0–2)
LYMPHOCYTES # BLD AUTO: 1.24 THOUSANDS/ΜL (ref 0.6–4.47)
LYMPHOCYTES NFR BLD AUTO: 22 % (ref 14–44)
MAGNESIUM SERPL-MCNC: 1.8 MG/DL (ref 1.6–2.6)
MCH RBC QN AUTO: 25.7 PG (ref 26.8–34.3)
MCHC RBC AUTO-ENTMCNC: 31.4 G/DL (ref 31.4–37.4)
MCV RBC AUTO: 82 FL (ref 82–98)
MONOCYTES # BLD AUTO: 0.6 THOUSAND/ΜL (ref 0.17–1.22)
MONOCYTES NFR BLD AUTO: 10 % (ref 4–12)
NEUTROPHILS # BLD AUTO: 3.66 THOUSANDS/ΜL (ref 1.85–7.62)
NEUTS SEG NFR BLD AUTO: 64 % (ref 43–75)
NRBC BLD AUTO-RTO: 0 /100 WBCS
PLATELET # BLD AUTO: 269 THOUSANDS/UL (ref 149–390)
PMV BLD AUTO: 10.3 FL (ref 8.9–12.7)
POTASSIUM SERPL-SCNC: 3.9 MMOL/L (ref 3.5–5.3)
POTASSIUM SERPL-SCNC: 5 MMOL/L (ref 3.5–5.3)
PROT SERPL-MCNC: 7.9 G/DL (ref 6.4–8.2)
RBC # BLD AUTO: 3.54 MILLION/UL (ref 3.88–5.62)
SODIUM SERPL-SCNC: 136 MMOL/L (ref 136–145)
SODIUM SERPL-SCNC: 138 MMOL/L (ref 136–145)
WBC # BLD AUTO: 5.76 THOUSAND/UL (ref 4.31–10.16)

## 2021-02-17 PROCEDURE — 80053 COMPREHEN METABOLIC PANEL: CPT | Performed by: STUDENT IN AN ORGANIZED HEALTH CARE EDUCATION/TRAINING PROGRAM

## 2021-02-17 PROCEDURE — 80048 BASIC METABOLIC PNL TOTAL CA: CPT | Performed by: INTERNAL MEDICINE

## 2021-02-17 PROCEDURE — 85025 COMPLETE CBC W/AUTO DIFF WBC: CPT | Performed by: STUDENT IN AN ORGANIZED HEALTH CARE EDUCATION/TRAINING PROGRAM

## 2021-02-17 PROCEDURE — 99232 SBSQ HOSP IP/OBS MODERATE 35: CPT | Performed by: INTERNAL MEDICINE

## 2021-02-17 PROCEDURE — 83735 ASSAY OF MAGNESIUM: CPT | Performed by: STUDENT IN AN ORGANIZED HEALTH CARE EDUCATION/TRAINING PROGRAM

## 2021-02-17 PROCEDURE — 73030 X-RAY EXAM OF SHOULDER: CPT

## 2021-02-17 RX ORDER — HYDROMORPHONE HCL/PF 1 MG/ML
0.5 SYRINGE (ML) INJECTION ONCE
Status: COMPLETED | OUTPATIENT
Start: 2021-02-17 | End: 2021-02-17

## 2021-02-17 RX ORDER — CYCLOBENZAPRINE HCL 10 MG
10 TABLET ORAL 3 TIMES DAILY PRN
Status: DISCONTINUED | OUTPATIENT
Start: 2021-02-17 | End: 2021-02-18 | Stop reason: HOSPADM

## 2021-02-17 RX ORDER — OXYCODONE HYDROCHLORIDE 5 MG/1
5 TABLET ORAL EVERY 6 HOURS PRN
Status: DISCONTINUED | OUTPATIENT
Start: 2021-02-17 | End: 2021-02-18 | Stop reason: HOSPADM

## 2021-02-17 RX ADMIN — CARVEDILOL 25 MG: 12.5 TABLET, FILM COATED ORAL at 16:15

## 2021-02-17 RX ADMIN — HYDROMORPHONE HYDROCHLORIDE 0.5 MG: 1 INJECTION, SOLUTION INTRAMUSCULAR; INTRAVENOUS; SUBCUTANEOUS at 10:07

## 2021-02-17 RX ADMIN — CYCLOBENZAPRINE HYDROCHLORIDE 10 MG: 10 TABLET, FILM COATED ORAL at 17:22

## 2021-02-17 RX ADMIN — HYDRALAZINE HYDROCHLORIDE 75 MG: 25 TABLET, FILM COATED ORAL at 16:15

## 2021-02-17 RX ADMIN — HYDRALAZINE HYDROCHLORIDE 75 MG: 25 TABLET, FILM COATED ORAL at 06:08

## 2021-02-17 RX ADMIN — CALCIUM CARBONATE 1250 MG: 1250 SUSPENSION ORAL at 08:06

## 2021-02-17 RX ADMIN — CALCIUM CARBONATE 1250 MG: 1250 SUSPENSION ORAL at 12:33

## 2021-02-17 RX ADMIN — OXYCODONE HYDROCHLORIDE 5 MG: 5 TABLET ORAL at 21:06

## 2021-02-17 RX ADMIN — CARVEDILOL 25 MG: 12.5 TABLET, FILM COATED ORAL at 08:07

## 2021-02-17 RX ADMIN — CALCIUM CARBONATE 1250 MG: 1250 SUSPENSION ORAL at 16:16

## 2021-02-17 RX ADMIN — TRAMADOL HYDROCHLORIDE 50 MG: 50 TABLET, FILM COATED ORAL at 08:07

## 2021-02-17 RX ADMIN — CALCITRIOL CAPSULES 0.25 MCG 1 MCG: 0.25 CAPSULE ORAL at 08:07

## 2021-02-17 RX ADMIN — NIFEDIPINE 90 MG: 30 TABLET, FILM COATED, EXTENDED RELEASE ORAL at 06:08

## 2021-02-17 RX ADMIN — HYDRALAZINE HYDROCHLORIDE 75 MG: 25 TABLET, FILM COATED ORAL at 21:03

## 2021-02-17 NOTE — PROGRESS NOTES
Progress Note - Alysia Purchase 1975, 39 y o  male MRN: [id number]    Unit/Bed#: -01 Encounter: [id number]    Primary Care Provider: Agustín Rasmussen DO   Date and time admitted to hospital: 2/14/2021  2:30 PM        QT prolongation  Assessment & Plan  · QT initially 526  · Avoid QT prolongation urgent    Shoulder pain  Assessment & Plan  · Initial report of left shoulder pain which has since resolved status post trigger point injection  · Follow-up outpatient with PCP  · Patient also has cervical stenosis  · Consult Orthopedics  · Tramadol not working for him  With changed to oxycodone p r n  Durga Duran Also given 1 dose of IV Dilaudid this morning    Chest pain  Assessment & Plan  · Initially on arrival complaining of left shoulder pain radiation with chest pain as well  · Upon evaluation patient denies any chest pain reports that did resolve quickly upon arrival several hours ago  · SUMMER 1  · Serial troponin negative, ACS ruled out  · Chest pain resolved now    Anemia due to chronic kidney disease, on chronic dialysis [facility])  Assessment & Plan  Lab Results   Component Value Date    EGFR 6 02/17/2021    EGFR 3 02/17/2021    EGFR 4 02/16/2021    CREATININE 10 99 (H) 02/17/2021    CREATININE 16 90 (H) 02/17/2021    CREATININE 13 78 (H) 02/16/2021   · Hemoglobin currently 9 7  · Does appear slightly above baseline which is around 8-9  · No active bleeding  · Monitor CBC    Hypocalcemia  Assessment & Plan  · On BMP 6 6, ionized calcium 0 74 on admission  · Chronic issue and patient does report noncompliance with medication at home  · Two doses IV gluconate 1 g given in ED along with 1 dose IV magnesium  · Slightly improved  6 9 today  Continue calcium supplement  · Patient still complaining about spasm of his both shoulder/upper extremity  · Continue calcium supplement  Monitor calcium level    · Since patient is still complaining, will get shoulder x-ray and orthopedics evaluation    End-stage renal disease on hemodialysis  Assessment & Plan  Lab Results   Component Value Date    EGFR 6 02/17/2021    EGFR 3 02/17/2021    EGFR 4 02/16/2021    CREATININE 10 99 (H) 02/17/2021    CREATININE 16 90 (H) 02/17/2021    CREATININE 13 78 (H) 02/16/2021   · Creatinine currently 17 86,   · Baseline per review chart around 14-15  · Last dialysis session on Friday, due for dialysis today 2/15  · Appreciate nephrology consult for dialysis  · Monitor BMP  · Dialysis today  Acute congestive heart failure ([facility])  Assessment & Plan  Wt Readings from Last 3 Encounters:   02/14/21 107 kg (236 lb 15 9 oz)   01/26/21 99 8 kg (220 lb 0 3 oz)   01/11/21 99 8 kg (220 lb)   · No signs of fluid overload at this time  · Cardiac diet  · Continue home Coreg          * Hypertensive urgency  Assessment & Plan  · Patient presented to ED for left shoulder pain and radiation to chest  · Incidentally noted elevated blood pressure initially 222/124  · In setting of noncompliance as patient reports he has not been taking his home medications  · Despite multiple IV hypertension medications no real change in blood pressure from over 200s over 100s  · Seen by critical care for evaluation suggesting restarting home medications and rechecking blood pressure 2 hours later  · CT head negative  · Chest x-ray revealing AICD in place  · EKG x2 revealing normal sinus rhythm, prolonged QTC  · After receiving p o  Coreg 25 mg blood pressure did decrease a little bit to 188/98  · Upon approach remaining in 190s over 100  · Patient also given p o  Nifedipine 90 mg and p o  Hydralazine 75 mg in the ED  · Will continue to closely monitor blood pressure q 1 hour x6 hours then q 2 hours for now  · Will resume home meds including Coreg 25 mg p o  B i d , hydralazine 75 mg p o  Q 8 hours, nifedipine 90 mg p o  Q d    · Educated on importance of compliance with medication outpatient  · P r n  IV labetalol 10 mg q 6 hours also added with parameters  · No sign of end-organ damage at this time, patient without complaints, heart rate stable  · Case management consult for help with medication for discharge  · Blood pressure better controlled now  VTE Pharmacologic Prophylaxis:   Pharmacologic: Heparin  Mechanical VTE Prophylaxis in Place: Yes    Patient Centered Rounds: I have performed bedside rounds with nursing staff today  Discussions with Specialists or Other Care Team Provider:  Nephrology    Education and Discussions with Family / Patient:  Patient    Time Spent for Care: More than 50% of total time spent on counseling and coordination of care as described above  Current Length of Stay: 2 day(s)    Current Patient Status: Inpatient   Certification Statement: The patient will continue to require additional inpatient hospital stay due to See above    Discharge Plan:  24 hours    Code Status: Level 1 - Full Code      Subjective:   I have seen and examined the patient bedside this morning  Patient still complaining about both shoulder pain  Afebrile  No chest pain or shortness of breath  Objective:     Vitals:   Temp (24hrs), Av 1 °F (36 7 °C), Min:97 7 °F (36 5 °C), Max:98 4 °F (36 9 °C)    Temp:  [97 7 °F (36 5 °C)-98 4 °F (36 9 °C)] 98 4 °F (36 9 °C)  HR:  [70-86] 86  Resp:  [16-18] 16  BP: (131-170)/(69-90) 150/87  SpO2:  [96 %] 96 %  Body mass index is 34 01 kg/m²  Input and Output Summary (last 24 hours):        Intake/Output Summary (Last 24 hours) at 2021 1603  Last data filed at 2021 1549  Gross per 24 hour   Intake 1940 ml   Output 2300 ml   Net -360 ml       Physical Exam:     Physical Exam  General- Awake, alert and oriented x 3, looks comfortable  HEENT- Normocephalic, atraumatic  CVS- Normal S1/ S2, Regular rate and rhythm  Respiratory system- B/L clear breath sounds, no wheezing  Abdomen- Soft, Non distended, no tenderness, Bowel sound- present  CNS- No acute focal neurologic deficit noted    Additional Data:     Labs:    Results from last 7 days   Lab Units 02/17/21  1316   WBC Thousand/uL 5 76   HEMOGLOBIN g/dL 9 1*   HEMATOCRIT % 29 0*   PLATELETS Thousands/uL 269   NEUTROS PCT % 64   LYMPHS PCT % 22   MONOS PCT % 10   EOS PCT % 3     Results from last 7 days   Lab Units 02/17/21  1343 02/17/21  1316   SODIUM mmol/L 138 136   POTASSIUM mmol/L 3 9 5 0   CHLORIDE mmol/L 93* 90*   CO2 mmol/L 26 23   BUN mg/dL 66* 98*   CREATININE mg/dL 10 99* 16 90*   ANION GAP mmol/L 19* 23*   CALCIUM mg/dL 7 4* 6 6*   ALBUMIN g/dL  --  2 8*   TOTAL BILIRUBIN mg/dL  --  0 30   ALK PHOS U/L  --  276*   ALT U/L  --  15   AST U/L  --  12   GLUCOSE RANDOM mg/dL 129 171*         Results from last 7 days   Lab Units 02/15/21  0628 02/15/21  0513   POC GLUCOSE mg/dl 68 131                   * I Have Reviewed All Lab Data Listed Above  * Additional Pertinent Lab Tests Reviewed:  All Labs Within Last 24 Hours Reviewed    Imaging:    Imaging Reports Reviewed Today Include:   Imaging Personally Reviewed by Myself Includes:      Recent Cultures (last 7 days):           Last 24 Hours Medication List:   Current Facility-Administered Medications   Medication Dose Route Frequency Provider Last Rate    acetaminophen  650 mg Oral Q6H PRN Aaron Syed PA-C      aluminum-magnesium hydroxide-simethicone  30 mL Oral Q6H PRN Aaron Syed PA-C      calcitriol  1 mcg Oral Daily Aaron Syed PA-C      Calcium Carbonate Antacid  1,250 mg Oral TID With Meals Flavio Dawn MD      carvedilol  25 mg Oral BID With Meals Aaron Syed PA-C      cyclobenzaprine  10 mg Oral TID PRN Tigre Levin MD      heparin (porcine)  5,000 Units Subcutaneous Q8H [address] Aaron Syed PA-C      hydrALAZINE  75 mg Oral Q8H [address] Aaron Syed PA-C      Labetalol HCl  10 mg Intravenous Q6H PRN Aaron Syed PA-C      NIFEdipine  90 mg Oral Daily Aaron Syed PA-C      oxyCODONE  5 mg Oral Q6H PRN Tigre Levin MD          Today, Patient Was Seen By: Talat Menendez MD    ** Please Note: Dictation voice to text software may have been used in the creation of this document   **

## 2021-02-17 NOTE — PLAN OF CARE
Pt received in room, report from Paulo Mclain [facility], goal set for 2 0kg    Pt self cannulate with 15 g needles, no issues  Post-Dialysis RN Treatment Note    Blood Pressure:  Pre  145/83 80 mm/Hg  Post  150/87 86   mmHg   EDW TBD  kg    Weight:  Pre   105 6  kg   Post  150/87 86   kg   Mode of weight measurement: standing scale   Volume Removed 1800  ml    Treatment duration 165 minutes    NS given      Treatment shortened?  Pt requested to stop treatment   Medications given during Rx    Estimated Kt/V  NA  Access Type R AVF   Access Status:  Patent   Report called to primary nurse   YES  Errol Silva RN      Problem: METABOLIC, FLUID AND ELECTROLYTES - ADULT  Goal: Electrolytes maintained within normal limits  Description: INTERVENTIONS:  - Monitor labs and assess patient for signs and symptoms of electrolyte imbalances  - Administer electrolyte replacement as ordered  - Monitor response to electrolyte replacements, including repeat lab results as appropriate  - Instruct patient on fluid and nutrition as appropriate  Outcome: Progressing  Goal: Fluid balance maintained  Description: INTERVENTIONS:  - Monitor labs   - Monitor I/O and WT  - Instruct patient on fluid and nutrition as appropriate  - Assess for signs & symptoms of volume excess or deficit  Outcome: Progressing

## 2021-02-17 NOTE — ASSESSMENT & PLAN NOTE
Lab Results   Component Value Date    EGFR 6 02/17/2021    EGFR 3 02/17/2021    EGFR 4 02/16/2021    CREATININE 10 99 (H) 02/17/2021    CREATININE 16 90 (H) 02/17/2021    CREATININE 13 78 (H) 02/16/2021   · Creatinine currently 17 86,   · Baseline per review chart around 14-15  · Last dialysis session on Friday, due for dialysis today 2/15  · Appreciate nephrology consult for dialysis  · Monitor BMP  · Dialysis today

## 2021-02-17 NOTE — PLAN OF CARE
Problem: Potential for Falls  Goal: Patient will remain free of falls  Description: INTERVENTIONS:  - Assess patient frequently for physical needs  -  Identify cognitive and physical deficits and behaviors that affect risk of falls    -  [place] fall precautions as indicated by assessment   - Educate patient/family on patient safety including physical limitations  - Instruct patient to call for assistance with activity based on assessment  - Modify environment to reduce risk of injury  - Consider OT/PT consult to assist with strengthening/mobility  Outcome: Progressing     Problem: PAIN - ADULT  Goal: Verbalizes/displays adequate comfort level or baseline comfort level  Description: Interventions:  - Encourage patient to monitor pain and request assistance  - Assess pain using appropriate pain scale  - Administer analgesics based on type and severity of pain and evaluate response  - Implement non-pharmacological measures as appropriate and evaluate response  - Consider cultural and social influences on pain and pain management  - Notify physician/advanced practitioner if interventions unsuccessful or patient reports new pain  Outcome: Progressing     Problem: INFECTION - ADULT  Goal: Absence or prevention of progression during hospitalization  Description: INTERVENTIONS:  - Assess and monitor for signs and symptoms of infection  - Monitor lab/diagnostic results  - Monitor all insertion sites, i e  indwelling lines, tubes, and drains  - Monitor endotracheal if appropriate and nasal secretions for changes in amount and color  - [place] appropriate cooling/warming therapies per order  - Administer medications as ordered  - Instruct and encourage patient and family to use good hand hygiene technique  - Identify and instruct in appropriate isolation precautions for identified infection/condition  Outcome: Progressing  Goal: Absence of fever/infection during neutropenic period  Description: INTERVENTIONS:  - Monitor WBC    Outcome: Progressing     Problem: SAFETY ADULT  Goal: Patient will remain free of falls  Description: INTERVENTIONS:  - Assess patient frequently for physical needs  -  Identify cognitive and physical deficits and behaviors that affect risk of falls    -  [place] fall precautions as indicated by assessment   - Educate patient/family on patient safety including physical limitations  - Instruct patient to call for assistance with activity based on assessment  - Modify environment to reduce risk of injury  - Consider OT/PT consult to assist with strengthening/mobility  Outcome: Progressing  Goal: Maintain or return to baseline ADL function  Description: INTERVENTIONS:  -  Assess patient's ability to carry out ADLs; assess patient's baseline for ADL function and identify physical deficits which impact ability to perform ADLs (bathing, care of mouth/teeth, toileting, grooming, dressing, etc )  - Assess/evaluate cause of self-care deficits   - Assess range of motion  - Assess patient's mobility; develop plan if impaired  - Assess patient's need for assistive devices and provide as appropriate  - Encourage maximum independence but intervene and supervise when necessary  - Involve family in performance of ADLs  - Assess for home care needs following discharge   - Consider OT consult to assist with ADL evaluation and planning for discharge  - Provide patient education as appropriate  Outcome: Progressing  Goal: Maintain or return mobility status to optimal level  Description: INTERVENTIONS:  - Assess patient's baseline mobility status (ambulation, transfers, stairs, etc )    - Identify cognitive and physical deficits and behaviors that affect mobility  - Identify mobility aids required to assist with transfers and/or ambulation (gait belt, sit-to-stand, lift, walker, cane, etc )  - [place] fall precautions as indicated by assessment  - Record patient progress and toleration of activity level on Mobility SBAR; progress patient to next Phase/Stage  - Instruct patient to call for assistance with activity based on assessment  - Consider rehabilitation consult to assist with strengthening/weightbearing, etc   Outcome: Progressing     Problem: DISCHARGE PLANNING  Goal: Discharge to home or other facility with appropriate resources  Description: INTERVENTIONS:  - Identify barriers to discharge w/patient and caregiver  - Arrange for needed discharge resources and transportation as appropriate  - Identify discharge learning needs (meds, wound care, etc )  - Arrange for interpretive services to assist at discharge as needed  - Refer to Case Management Department for coordinating discharge planning if the patient needs post-hospital services based on physician/advanced practitioner order or complex needs related to functional status, cognitive ability, or social support system  Outcome: Progressing     Problem: Knowledge Deficit  Goal: Patient/family/caregiver demonstrates understanding of disease process, treatment plan, medications, and discharge instructions  Description: Complete learning assessment and assess knowledge base    Interventions:  - Provide teaching at level of understanding  - Provide teaching via preferred learning methods  Outcome: Progressing     Problem: CARDIOVASCULAR - ADULT  Goal: Maintains optimal cardiac output and hemodynamic stability  Description: INTERVENTIONS:  - Monitor I/O, vital signs and rhythm  - Monitor for S/S and trends of decreased cardiac output  - Administer and titrate ordered vasoactive medications to optimize hemodynamic stability  - Assess quality of pulses, skin color and temperature  - Assess for signs of decreased coronary artery perfusion  - Instruct patient to report change in severity of symptoms  Outcome: Progressing  Goal: Absence of cardiac dysrhythmias or at baseline rhythm  Description: INTERVENTIONS:  - Continuous cardiac monitoring, vital signs, obtain 12 lead EKG if ordered  - Administer antiarrhythmic and heart rate control medications as ordered  - Monitor electrolytes and administer replacement therapy as ordered  Outcome: Progressing     Problem: METABOLIC, FLUID AND ELECTROLYTES - ADULT  Goal: Electrolytes maintained within normal limits  Description: INTERVENTIONS:  - Monitor labs and assess patient for signs and symptoms of electrolyte imbalances  - Administer electrolyte replacement as ordered  - Monitor response to electrolyte replacements, including repeat lab results as appropriate  - Instruct patient on fluid and nutrition as appropriate  Outcome: Progressing  Goal: Fluid balance maintained  Description: INTERVENTIONS:  - Monitor labs   - Monitor I/O and WT  - Instruct patient on fluid and nutrition as appropriate  - Assess for signs & symptoms of volume excess or deficit  Outcome: Progressing  Goal: Glucose maintained within target range  Description: INTERVENTIONS:  - Monitor Blood Glucose as ordered  - Assess for signs and symptoms of hyperglycemia and hypoglycemia  - Administer ordered medications to maintain glucose within target range  - Assess nutritional intake and initiate nutrition service referral as needed  Outcome: Progressing     Problem: HEMATOLOGIC - ADULT  Goal: Maintains hematologic stability  Description: INTERVENTIONS  - Assess for signs and symptoms of bleeding or hemorrhage  - Monitor labs  - Administer supportive blood products/factors as ordered and appropriate  Outcome: Progressing

## 2021-02-17 NOTE — ASSESSMENT & PLAN NOTE
· Initial report of left shoulder pain which has since resolved status post trigger point injection  · Follow-up outpatient with PCP  · Patient also has cervical stenosis  · Consult Orthopedics  · Tramadol not working for him  With changed to oxycodone hel[facility] [place] [facility]   Also given 1 dose of IV Dilaudid this morning

## 2021-02-17 NOTE — PROGRESS NOTES
NEPHROLOGY PROGRESS NOTE    Patient: Eugene Petersen               Sex: male          DOA: 2/14/2021  2:30 PM   YOB: 1975        Age:  39 y o         LOS:  LOS: 2 days       HPI     Patient with ESRD admitted with muscle pain and hypertension    SUBJECTIVE     Patient is feeling better though still continue to have pain in both upper extremity around the shoulder    Also complaining of weakness    Claims he cannot lift his hand    No chest pain no palpitation or shortness of breath    CURRENT MEDICATIONS       Current Facility-Administered Medications:     acetaminophen (TYLENOL) tablet 650 mg, 650 mg, Oral, Q6H PRN, Bridger Gutierrez PA-C, 650 mg at 02/16/21 0306    aluminum-magnesium hydroxide-simethicone (MYLANTA) oral suspension 30 mL, 30 mL, Oral, Q6H PRN, Bridger Gutierrez PA-C    calcitriol (ROCALTROL) capsule 1 mcg, 1 mcg, Oral, Daily, Bridger Gutierrez PA-C, 1 mcg at 02/17/21 0807    Calcium Carbonate Antacid oral suspension 1,250 mg, 1,250 mg, Oral, TID With Meals, oJi Lynne MD, 1,250 mg at 02/17/21 0806    carvedilol (COREG) tablet 25 mg, 25 mg, Oral, BID With Meals, Bridger Gutierrez PA-C, 25 mg at 02/17/21 0807    cyclobenzaprine (FLEXERIL) tablet 10 mg, 10 mg, Oral, TID PRN, Frank Dawn MD    heparin (porcine) subcutaneous injection 5,000 Units, 5,000 Units, Subcutaneous, Q8H [facility] **AND** [CANCELED] Platelet count, , , Once, Bridger Gutierrez PA-C    hydrALAZINE (APRESOLINE) tablet 75 mg, 75 mg, Oral, Q8H [facility], Amy Cornell PA-C, 75 mg at 02/17/21 0608    HYDROmorphone (DILAUDID) injection 0 5 mg, 0 5 mg, Intravenous, Once, Frank Dawn MD    Labetalol HCl (NORMODYNE) injection 10 mg, 10 mg, Intravenous, Q6H PRN, Brdiger Gutierrez PA-C, 10 mg at 02/15/21 0157    NIFEdipine (PROCARDIA XL) 24 hr tablet 90 mg, 90 mg, Oral, Daily, Amy Cornell PA-C, 90 mg at 02/17/21 0608    traMADol (ULTRAM) tablet 50 mg, 50 mg, Oral, Q8H PRN, Frank Dawn MD, 50 mg at 02/17/21 1730    OBJECTIVE     Current Weight: Weight - Scale: 107 kg (236 lb 15 9 oz)  Vitals:    02/17/21 0739   BP: 139/90   Pulse:    Resp:    Temp: 98 3 °F (36 8 °C)   SpO2:        Intake/Output Summary (Last 24 hours) at 2/17/2021 1002  Last data filed at 2/17/2021 0615  Gross per 24 hour   Intake 720 ml   Output 500 ml   Net 220 ml       PHYSICAL EXAMINATION     Physical Exam  Constitutional:       General: He is not in acute distress  Appearance: He is well-developed  HENT:      Head: Normocephalic  Eyes:      General: No scleral icterus  Conjunctiva/sclera: Conjunctivae normal       Pupils: Pupils are equal, round, and reactive to light  Neck:      Musculoskeletal: Neck supple  Vascular: No JVD  Cardiovascular:      Rate and Rhythm: Normal rate and regular rhythm  Heart sounds: Normal heart sounds  Pulmonary:      Effort: Pulmonary effort is normal       Breath sounds: Normal breath sounds  No wheezing  Abdominal:      General: Abdomen is flat  Bowel sounds are normal       Palpations: Abdomen is soft  Tenderness: There is no abdominal tenderness  Musculoskeletal: Normal range of motion  Skin:     General: Skin is warm  Findings: No rash  Neurological:      Mental Status: He is alert and oriented to person, place, and time     Psychiatric:         Behavior: Behavior normal           LAB RESULTS     Results from last 7 days   Lab Units 02/16/21  0435 02/15/21  0933 02/15/21  0415 02/14/21  1454 02/10/21  1130   WBC Thousand/uL 4 58  --  5 77 4 58  --    HEMOGLOBIN g/dL 8 8*  --  9 4* 9 7*  --    HEMATOCRIT % 28 3*  --  31 0* 31 2*  --    PLATELETS Thousands/uL 262  --  234 244  --    POTASSIUM mmol/L 4 1 6 2* 6 3* 5 2  --    CHLORIDE mmol/L 92* 92* 92* 94*  --    CO2 mmol/L 25 17* 20* 22  --    BUN mg/dL 84* 123* 124* 113*  --    CREATININE mg/dL 13 78* 18 78* 18 65* 17 86*  --    EGFR ml/min/1 73sq m 4 3 3 3  --    CALCIUM mg/dL 6 9* 6 6* 6 5* 6 6* 6 7*   MAGNESIUM mg/dL 2 0  --  2 3  --   --        RADIOLOGY RESULTS      Results for orders placed during the hospital encounter of 02/14/21   XR chest 1 view portable    Narrative CHEST     INDICATION:   weakness  COMPARISON:  1/27/2021    EXAM PERFORMED/VIEWS:  XR CHEST PORTABLE      FINDINGS:  ICD transvenous pacemaker present as on previous examination  The cardiac silhouette is without change from the prior study  The lungs are clear  No pneumothorax or pleural effusion  Osseous structures appear within normal limits for patient age  Impression No acute pulmonary disease            Workstation performed: [id number]       Results for orders placed during the hospital encounter of 01/27/21   XR chest 2 views    Narrative CHEST     INDICATION:  Neck and back pain  COMPARISON:  10/25/2020, CT chest 7/3/2020    EXAM PERFORMED/VIEWS:  XR CHEST AP & LATERAL    FINDINGS:    Cardiomediastinal silhouette appears stable  Left chest wall AICD device  The lungs are clear  No pneumothorax or pleural effusion  Suggestive evidence of increased bony density associated with history of end-stage renal disease  Impression No acute cardiopulmonary disease  Workstation performed: [id number]       Results for orders placed during the hospital encounter of 07/03/20   CT chest without contrast    Narrative CT CHEST WITHOUT IV CONTRAST    INDICATION:   sob  COMPARISON:  None  TECHNIQUE: CT examination of the chest was performed without intravenous contrast   Axial, sagittal, and coronal 2D reformatted images were created from the source data and submitted for interpretation  Radiation dose length product (DLP) for this visit:  278 mGy-cm   This examination, like all CT scans performed in the [facility], was performed utilizing techniques to minimize radiation dose exposure, including the use of iterative   reconstruction and automated exposure control       FINDINGS:    LUNGS:  The trachea and central bronchial tree are patent  Diffuse airspace opacities are seen throughout the lungs (left greater than right)  PLEURA:  Trace left-sided pleural effusion is seen  HEART/GREAT VESSELS:  The heart is enlarged  Trace pericardial effusion is seen  MEDIASTINUM AND MAXIMILIAN:  Mediastinal lymph nodes measuring up to 1 3 cm are visualized  Limited evaluation the hilar region due to lack of intravenous contrast     CHEST WALL AND LOWER NECK:   Left-sided central venous catheter is seen  VISUALIZED STRUCTURES IN THE UPPER ABDOMEN:  Unremarkable  OSSEOUS STRUCTURES:  Increased sclerosis of the osseous structures is seen  Impression Diffuse airspace opacities throughout the lungs which may represent infection versus pulmonary edema  Recommend short-term follow-up CT scan of the chest to evaluate for resolution in 3 months  The study was marked in EPIC for significant notification  Workstation performed: [id number]       No results found for this or any previous visit  No results found for this or any previous visit  No results found for this or any previous visit  PLAN / RECOMMENDATIONS      ESRD:  Will get dialyzed today  Patient is on home dialysis and get dialysis item slowly    Hypocalcemia:  Will check a calcium today    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  One year liquid calcium will continue    Bilateral shoulder pain with weakness:  Etiology unclear CPK is within acceptable range ruling out rhabdomyolysis  Will advised orthopedic look at him  Discussed with slim    Hypertension:  Reasonably well controlled    Anemia:  Continue treat with Alex Cleveland MD  Nephrology  2/17/2021        Portions of the record may have been created with voice recognition software  Occasional wrong word or "sound a like" substitutions may have occurred due to the inherent limitations of voice recognition software   Read the chart carefully and recognize, using context, where substitutions have occurred

## 2021-02-17 NOTE — ASSESSMENT & PLAN NOTE
Lab Results   Component Value Date    EGFR 6 02/17/2021    EGFR 3 02/17/2021    EGFR 4 02/16/2021    CREATININE 10 99 (H) 02/17/2021    CREATININE 16 90 (H) 02/17/2021    CREATININE 13 78 (H) 02/16/2021   · Hemoglobin currently 9 7  · Does appear slightly above baseline which is around 8-9  · No active bleeding  · Monitor CBC

## 2021-02-17 NOTE — PLAN OF CARE
Problem: Potential for Falls  Goal: Patient will remain free of falls  Description: INTERVENTIONS:  - Assess patient frequently for physical needs  -  Identify cognitive and physical deficits and behaviors that affect risk of falls    -  [place] fall precautions as indicated by assessment   - Educate patient/family on patient safety including physical limitations  - Instruct patient to call for assistance with activity based on assessment  - Modify environment to reduce risk of injury  - Consider OT/PT consult to assist with strengthening/mobility  Outcome: Progressing     Problem: PAIN - ADULT  Goal: Verbalizes/displays adequate comfort level or baseline comfort level  Description: Interventions:  - Encourage patient to monitor pain and request assistance  - Assess pain using appropriate pain scale  - Administer analgesics based on type and severity of pain and evaluate response  - Implement non-pharmacological measures as appropriate and evaluate response  - Consider cultural and social influences on pain and pain management  - Notify physician/advanced practitioner if interventions unsuccessful or patient reports new pain  Outcome: Progressing     Problem: INFECTION - ADULT  Goal: Absence or prevention of progression during hospitalization  Description: INTERVENTIONS:  - Assess and monitor for signs and symptoms of infection  - Monitor lab/diagnostic results  - Monitor all insertion sites, i e  indwelling lines, tubes, and drains  - Monitor endotracheal if appropriate and nasal secretions for changes in amount and color  - [place] appropriate cooling/warming therapies per order  - Administer medications as ordered  - Instruct and encourage patient and family to use good hand hygiene technique  - Identify and instruct in appropriate isolation precautions for identified infection/condition  Outcome: Progressing  Goal: Absence of fever/infection during neutropenic period  Description: INTERVENTIONS:  - Monitor WBC    Outcome: Progressing     Problem: SAFETY ADULT  Goal: Patient will remain free of falls  Description: INTERVENTIONS:  - Assess patient frequently for physical needs  -  Identify cognitive and physical deficits and behaviors that affect risk of falls    -  [place] fall precautions as indicated by assessment   - Educate patient/family on patient safety including physical limitations  - Instruct patient to call for assistance with activity based on assessment  - Modify environment to reduce risk of injury  - Consider OT/PT consult to assist with strengthening/mobility  Outcome: Progressing  Goal: Maintain or return to baseline ADL function  Description: INTERVENTIONS:  -  Assess patient's ability to carry out ADLs; assess patient's baseline for ADL function and identify physical deficits which impact ability to perform ADLs (bathing, care of mouth/teeth, toileting, grooming, dressing, etc )  - Assess/evaluate cause of self-care deficits   - Assess range of motion  - Assess patient's mobility; develop plan if impaired  - Assess patient's need for assistive devices and provide as appropriate  - Encourage maximum independence but intervene and supervise when necessary  - Involve family in performance of ADLs  - Assess for home care needs following discharge   - Consider OT consult to assist with ADL evaluation and planning for discharge  - Provide patient education as appropriate  Outcome: Progressing  Goal: Maintain or return mobility status to optimal level  Description: INTERVENTIONS:  - Assess patient's baseline mobility status (ambulation, transfers, stairs, etc )    - Identify cognitive and physical deficits and behaviors that affect mobility  - Identify mobility aids required to assist with transfers and/or ambulation (gait belt, sit-to-stand, lift, walker, cane, etc )  - [place] fall precautions as indicated by assessment  - Record patient progress and toleration of activity level on Mobility SBAR; progress patient to next Phase/Stage  - Instruct patient to call for assistance with activity based on assessment  - Consider rehabilitation consult to assist with strengthening/weightbearing, etc   Outcome: Progressing     Problem: DISCHARGE PLANNING  Goal: Discharge to home or other facility with appropriate resources  Description: INTERVENTIONS:  - Identify barriers to discharge w/patient and caregiver  - Arrange for needed discharge resources and transportation as appropriate  - Identify discharge learning needs (meds, wound care, etc )  - Arrange for interpretive services to assist at discharge as needed  - Refer to Case Management Department for coordinating discharge planning if the patient needs post-hospital services based on physician/advanced practitioner order or complex needs related to functional status, cognitive ability, or social support system  Outcome: Progressing     Problem: Knowledge Deficit  Goal: Patient/family/caregiver demonstrates understanding of disease process, treatment plan, medications, and discharge instructions  Description: Complete learning assessment and assess knowledge base    Interventions:  - Provide teaching at level of understanding  - Provide teaching via preferred learning methods  Outcome: Progressing     Problem: CARDIOVASCULAR - ADULT  Goal: Maintains optimal cardiac output and hemodynamic stability  Description: INTERVENTIONS:  - Monitor I/O, vital signs and rhythm  - Monitor for S/S and trends of decreased cardiac output  - Administer and titrate ordered vasoactive medications to optimize hemodynamic stability  - Assess quality of pulses, skin color and temperature  - Assess for signs of decreased coronary artery perfusion  - Instruct patient to report change in severity of symptoms  Outcome: Progressing  Goal: Absence of cardiac dysrhythmias or at baseline rhythm  Description: INTERVENTIONS:  - Continuous cardiac monitoring, vital signs, obtain 12 lead EKG if ordered  - Administer antiarrhythmic and heart rate control medications as ordered  - Monitor electrolytes and administer replacement therapy as ordered  Outcome: Progressing     Problem: METABOLIC, FLUID AND ELECTROLYTES - ADULT  Goal: Electrolytes maintained within normal limits  Description: INTERVENTIONS:  - Monitor labs and assess patient for signs and symptoms of electrolyte imbalances  - Administer electrolyte replacement as ordered  - Monitor response to electrolyte replacements, including repeat lab results as appropriate  - Instruct patient on fluid and nutrition as appropriate  Outcome: Progressing  Goal: Fluid balance maintained  Description: INTERVENTIONS:  - Monitor labs   - Monitor I/O and WT  - Instruct patient on fluid and nutrition as appropriate  - Assess for signs & symptoms of volume excess or deficit  Outcome: Progressing  Goal: Glucose maintained within target range  Description: INTERVENTIONS:  - Monitor Blood Glucose as ordered  - Assess for signs and symptoms of hyperglycemia and hypoglycemia  - Administer ordered medications to maintain glucose within target range  - Assess nutritional intake and initiate nutrition service referral as needed  Outcome: Progressing     Problem: HEMATOLOGIC - ADULT  Goal: Maintains hematologic stability  Description: INTERVENTIONS  - Assess for signs and symptoms of bleeding or hemorrhage  - Monitor labs  - Administer supportive blood products/factors as ordered and appropriate  Outcome: Progressing

## 2021-02-17 NOTE — ASSESSMENT & PLAN NOTE
· On BMP 6 6, ionized calcium 0 74 on admission  · Chronic issue and patient does report noncompliance with medication at home  · Two doses IV gluconate 1 g given in ED along with 1 dose IV magnesium  · Slightly improved  6 9 today  Continue calcium supplement  · Patient still complaining about spasm of his both shoulder/upper extremity  · Continue calcium supplement  Monitor calcium level    · Since patient is still complaining, will get shoulder x-ray and orthopedics evaluation

## 2021-02-18 VITALS
BODY MASS INDEX: 33.93 KG/M2 | DIASTOLIC BLOOD PRESSURE: 91 MMHG | WEIGHT: 236.99 LBS | OXYGEN SATURATION: 96 % | SYSTOLIC BLOOD PRESSURE: 149 MMHG | HEART RATE: 86 BPM | RESPIRATION RATE: 20 BRPM | HEIGHT: 70 IN | TEMPERATURE: 98.5 F

## 2021-02-18 LAB
ALBUMIN SERPL BCP-MCNC: 3 G/DL (ref 3.5–5)
ALP SERPL-CCNC: 275 U/L (ref 46–116)
ALT SERPL W P-5'-P-CCNC: 21 U/L (ref 12–78)
ANION GAP SERPL CALCULATED.3IONS-SCNC: 19 MMOL/L (ref 4–13)
AST SERPL W P-5'-P-CCNC: 22 U/L (ref 5–45)
BASOPHILS # BLD AUTO: 0.02 THOUSANDS/ΜL (ref 0–0.1)
BASOPHILS NFR BLD AUTO: 0 % (ref 0–1)
BILIRUB SERPL-MCNC: 0.3 MG/DL (ref 0.2–1)
BUN SERPL-MCNC: 60 MG/DL (ref 5–25)
CALCIUM ALBUM COR SERPL-MCNC: 8.3 MG/DL (ref 8.3–10.1)
CALCIUM SERPL-MCNC: 7.5 MG/DL (ref 8.3–10.1)
CHLORIDE SERPL-SCNC: 93 MMOL/L (ref 100–108)
CO2 SERPL-SCNC: 25 MMOL/L (ref 21–32)
CREAT SERPL-MCNC: 12.18 MG/DL (ref 0.6–1.3)
EOSINOPHIL # BLD AUTO: 0.14 THOUSAND/ΜL (ref 0–0.61)
EOSINOPHIL NFR BLD AUTO: 3 % (ref 0–6)
ERYTHROCYTE [DISTWIDTH] IN BLOOD BY AUTOMATED COUNT: 17.8 % (ref 11.6–15.1)
GFR SERPL CREATININE-BSD FRML MDRD: 5 ML/MIN/1.73SQ M
GLUCOSE SERPL-MCNC: 131 MG/DL (ref 65–140)
HCT VFR BLD AUTO: 30 % (ref 36.5–49.3)
HGB BLD-MCNC: 9.2 G/DL (ref 12–17)
IMM GRANULOCYTES # BLD AUTO: 0.04 THOUSAND/UL (ref 0–0.2)
IMM GRANULOCYTES NFR BLD AUTO: 1 % (ref 0–2)
LYMPHOCYTES # BLD AUTO: 1.11 THOUSANDS/ΜL (ref 0.6–4.47)
LYMPHOCYTES NFR BLD AUTO: 21 % (ref 14–44)
MAGNESIUM SERPL-MCNC: 1.9 MG/DL (ref 1.6–2.6)
MCH RBC QN AUTO: 25.4 PG (ref 26.8–34.3)
MCHC RBC AUTO-ENTMCNC: 30.7 G/DL (ref 31.4–37.4)
MCV RBC AUTO: 83 FL (ref 82–98)
MONOCYTES # BLD AUTO: 0.58 THOUSAND/ΜL (ref 0.17–1.22)
MONOCYTES NFR BLD AUTO: 11 % (ref 4–12)
NEUTROPHILS # BLD AUTO: 3.31 THOUSANDS/ΜL (ref 1.85–7.62)
NEUTS SEG NFR BLD AUTO: 64 % (ref 43–75)
NRBC BLD AUTO-RTO: 0 /100 WBCS
PLATELET # BLD AUTO: 270 THOUSANDS/UL (ref 149–390)
PMV BLD AUTO: 9.7 FL (ref 8.9–12.7)
POTASSIUM SERPL-SCNC: 4.3 MMOL/L (ref 3.5–5.3)
PROT SERPL-MCNC: 8.2 G/DL (ref 6.4–8.2)
RBC # BLD AUTO: 3.62 MILLION/UL (ref 3.88–5.62)
SODIUM SERPL-SCNC: 137 MMOL/L (ref 136–145)
WBC # BLD AUTO: 5.2 THOUSAND/UL (ref 4.31–10.16)

## 2021-02-18 PROCEDURE — 80053 COMPREHEN METABOLIC PANEL: CPT | Performed by: STUDENT IN AN ORGANIZED HEALTH CARE EDUCATION/TRAINING PROGRAM

## 2021-02-18 PROCEDURE — 85025 COMPLETE CBC W/AUTO DIFF WBC: CPT | Performed by: STUDENT IN AN ORGANIZED HEALTH CARE EDUCATION/TRAINING PROGRAM

## 2021-02-18 PROCEDURE — 83735 ASSAY OF MAGNESIUM: CPT | Performed by: STUDENT IN AN ORGANIZED HEALTH CARE EDUCATION/TRAINING PROGRAM

## 2021-02-18 PROCEDURE — 99254 IP/OBS CNSLTJ NEW/EST MOD 60: CPT | Performed by: PHYSICIAN ASSISTANT

## 2021-02-18 PROCEDURE — 99239 HOSP IP/OBS DSCHRG MGMT >30: CPT | Performed by: INTERNAL MEDICINE

## 2021-02-18 RX ORDER — ACETAMINOPHEN 325 MG/1
650 TABLET ORAL EVERY 6 HOURS PRN
Refills: 0
Start: 2021-02-18 | End: 2021-02-23

## 2021-02-18 RX ORDER — TRAMADOL HYDROCHLORIDE 50 MG/1
50 TABLET ORAL 2 TIMES DAILY PRN
Qty: 15 TABLET | Refills: 0 | Status: SHIPPED | OUTPATIENT
Start: 2021-02-18 | End: 2021-02-28

## 2021-02-18 RX ORDER — CALCIUM CARBONATE 1250 MG/5ML
1250 SUSPENSION ORAL
Qty: 450 ML | Refills: 0 | Status: SHIPPED | OUTPATIENT
Start: 2021-02-18 | End: 2021-10-25

## 2021-02-18 RX ORDER — HYDROMORPHONE HCL/PF 1 MG/ML
0.5 SYRINGE (ML) INJECTION ONCE AS NEEDED
Status: COMPLETED | OUTPATIENT
Start: 2021-02-18 | End: 2021-02-18

## 2021-02-18 RX ADMIN — NIFEDIPINE 90 MG: 30 TABLET, FILM COATED, EXTENDED RELEASE ORAL at 05:26

## 2021-02-18 RX ADMIN — HYDROMORPHONE HYDROCHLORIDE 0.5 MG: 1 INJECTION, SOLUTION INTRAMUSCULAR; INTRAVENOUS; SUBCUTANEOUS at 00:19

## 2021-02-18 RX ADMIN — HYDRALAZINE HYDROCHLORIDE 75 MG: 25 TABLET, FILM COATED ORAL at 05:25

## 2021-02-18 RX ADMIN — CARVEDILOL 25 MG: 12.5 TABLET, FILM COATED ORAL at 08:51

## 2021-02-18 RX ADMIN — OXYCODONE HYDROCHLORIDE 5 MG: 5 TABLET ORAL at 05:26

## 2021-02-18 RX ADMIN — CALCITRIOL CAPSULES 0.25 MCG 1 MCG: 0.25 CAPSULE ORAL at 08:52

## 2021-02-18 RX ADMIN — CALCIUM CARBONATE 1250 MG: 1250 SUSPENSION ORAL at 08:52

## 2021-02-18 NOTE — CONSULTS
Orthopedics   Sari Garland 39 y o  male MRN: [id number]  Unit/Bed#: [facility] XRAY      Chief Complaint:   bilateral shoulder pain R>L    HPI:  39 y o male complaining of bilateral shoulder pain R>L  Patient has a past medical history for prolonged QT syndrome, anemia due to chronic kidney disease on dialysis, acute congestive heart failure, hypertensive urgency  Patient states that he is had bilateral shoulder pain right greater than left for over 1 month now  He denies any injuries or trauma to the area  He states that with any movement of his shoulder he would have pain  He would note a knot 1st thing in the morning in the posterior aspect of his shoulder  It did start on the left side and then moved to the right side  This morning he has heat on the right shoulder of which he notes significant improvement  He denies any chest pain, shortness of breath, nausea, vomiting, diarrhea, lightheadedness, dizziness  He denies any numbness or tingling  Review Of Systems:   · Skin: Normal  · Neuro: See HPI  · Musculoskeletal: See HPI  · 14 point review of systems negative except as stated above     Past Medical History:   Past Medical History:   Diagnosis Date    Chronic kidney disease     Hypertension     Pressure injury of skin     Renal disorder     dialysis        Past Surgical History:   Past Surgical History:   Procedure Laterality Date    CARDIAC DEFIBRILLATOR PLACEMENT      IR AV FISTULAGRAM/GRAFTOGRAM  1/18/2019    IR TUNNELED CENTRAL LINE PLACEMENT  10/29/2020    PA EXPLORE PARATHYROID GLANDS N/A 10/22/2020    Procedure:  Three gland PARATHYROIDECTOMY, four gland exploration, intraoperative PTH monitoring;  Surgeon: Radha Navas MD;  [facility];  Service: Surgical Oncology    SPLIT THICKNESS SKIN GRAFT Left 12/2/2020    Procedure: SKIN GRAFT SPLIT THICKNESS (STSG)  LEFT ARM;  Surgeon: Awa Simmons MD;  [facility];  Service: Plastics    WOUND DEBRIDEMENT Left 12/2/2020 Procedure: DEBRIDEMENT OF LEFT ARM WOUND;  Surgeon: Keny Walters MD;  [facility];  Service: Plastics       Family History:  Family history reviewed and non-contributory  History reviewed  No pertinent family history      Social History:  Social History     Socioeconomic History    Marital status: /Civil Union     Spouse name: None    Number of children: None    Years of education: None    Highest education level: None   Occupational History    None   Social Needs    Financial resource strain: None    Food insecurity     Worry: None     Inability: None    Transportation needs     Medical: None     Non-medical: None   Tobacco Use    Smoking status: Former Smoker     Quit date: 6/15/2010     Years since quitting: 10 6    Smokeless tobacco: Never Used   Substance and Sexual Activity    Alcohol use: Not Currently     Frequency: Never    Drug use: Never    Sexual activity: Yes   Lifestyle    Physical activity     Days per week: None     Minutes per session: None    Stress: None   Relationships    Social connections     Talks on phone: None     Gets together: None     Attends Nondenominational service: None     Active member of club or organization: None     Attends meetings of clubs or organizations: None     Relationship status: None    Intimate partner violence     Fear of current or ex partner: None     Emotionally abused: None     Physically abused: None     Forced sexual activity: None   Other Topics Concern    None   Social History Narrative    None       Allergies:   No Known Allergies        Labs:  0   Lab Value Date/Time    HCT 30 0 (L) 02/18/2021 0522    HCT 29 0 (L) 02/17/2021 1316    HCT 28 3 (L) 02/16/2021 0435    HGB 9 2 (L) 02/18/2021 0522    HGB 9 1 (L) 02/17/2021 1316    HGB 8 8 (L) 02/16/2021 0435    INR 1 21 (H) 10/29/2020 1137    WBC 5 20 02/18/2021 0522    WBC 5 76 02/17/2021 1316    WBC 4 58 02/16/2021 0435       Meds:    Current Facility-Administered Medications:    acetaminophen (TYLENOL) tablet 650 mg, 650 mg, Oral, Q6H PRN, Ricardo Ramos PA-C, 650 mg at 02/16/21 0306    aluminum-magnesium hydroxide-simethicone (MYLANTA) oral suspension 30 mL, 30 mL, Oral, Q6H PRN, Ricardo Ramos PA-C    calcitriol (ROCALTROL) capsule 1 mcg, 1 mcg, Oral, Daily, Ricardo Ramos PA-C, 1 mcg at 02/17/21 0807    Calcium Carbonate Antacid oral suspension 1,250 mg, 1,250 mg, Oral, TID With Meals, Thomos Leyden, MD, 1,250 mg at 02/17/21 1616    carvedilol (COREG) tablet 25 mg, 25 mg, Oral, BID With Meals, Ricardo Ramos PA-C, 25 mg at 02/17/21 1615    cyclobenzaprine (FLEXERIL) tablet 10 mg, 10 mg, Oral, TID PRN, Jesse Coleman MD, 10 mg at 02/17/21 1722    heparin (porcine) subcutaneous injection 5,000 Units, 5,000 Units, Subcutaneous, Q8H [facility] & assisted **AND** [CANCELED] Platelet count, , , Once, Ricardo Ramos PA-C    hydrALAZINE (APRESOLINE) tablet 75 mg, 75 mg, Oral, Q8H [facility] & assisted, Amy Cornell PA-C, 75 mg at 02/18/21 0525    Labetalol HCl (NORMODYNE) injection 10 mg, 10 mg, Intravenous, Q6H PRN, Ricardo Ramos PA-C, 10 mg at 02/15/21 0157    NIFEdipine (PROCARDIA XL) 24 hr tablet 90 mg, 90 mg, Oral, Daily, Amy Cornell PA-C, 90 mg at 02/18/21 0526    oxyCODONE (ROXICODONE) IR tablet 5 mg, 5 mg, Oral, Q6H PRN, Jesse Coleman MD, 5 mg at 02/18/21 0526    Blood Culture:   Lab Results   Component Value Date    BLOODCX No Growth After 5 Days  10/27/2020       Wound Culture:   No results found for: WOUNDCULT    Ins and Outs:  I/O last 24 hours: In: 2543 [P O :580; I V :500; Other:500]  Out: 2300 [Other:2300]          Physical Exam:   /85   Pulse 86   Temp 97 5 °F (36 4 °C)   Resp 20   Ht 5' 10" (1 778 m)   Wt 107 kg (236 lb 15 9 oz)   SpO2 96%   BMI 34 01 kg/m²   Gen: Alert and oriented to person, place, time  HEENT: EOMI, eyes clear, moist mucus membranes, hearing intact  Respiratory: Bilateral chest rise   No audible wheezing found  Cardiovascular: Regular Rate and Rhythm  Abdomen: soft nontender/nondistended  Musculoskeletal: bilateral upper extremity  · Skin pink dry and intact  ·  no tenderness to palpation over the glenohumeral joint  ·   Normal range of motion with no pain  · Sensation intact to axillary, musculocutaneous, radial, ulna, median nerves  · 5/5 motor strength to axillary, musculocutaneous, radial, ulna, median nerves  · Negative Jobes test, Marie sign,  Neer impingement sign, lift off test, Belly Press test, horn blowers sign    Radiology:   I personally reviewed the films  X-rays of left shoulder shows  No acute fractures or dislocations    _*_*_*_*_*_*_*_*_*_*_*_*_*_*_*_*_*_*_*_*_*_*_*_*_*_*_*_*_*_*_*_*_*_*_*_*_*_*_*_*_*    Assessment:  45 y o male with  bilateral shoulder pain with muscle discomfort, resolving with heat    Plan:   · Weight-bearing as tolerated bilateral upper extremity   · DVT prophylaxis as per primary team  · Pain medications as per primary team   · PT/OT for range of motion and strengthening  · Body mass index is 34 01 kg/m²  moderately obese  Recommend nutrition and physical activity  · Dispo: [facility] for discharge from ortho perspective   ·  it was discussed with the patient that he can follow up with Spine and Pain Management for further treatment of his bilateral shoulder pain  Most of his discomfort was in the upper traps  Currently he notes no discomfort with the use of a heat pad  He can continue to use the he pad, take Tylenol as needed for pain, he can use muscle relaxants if these help  No surgical intervention is needed for this patient  He can follow up as an outpatient with Orthopedics as needed  No further orthopedic intervention is needed at this time  If there is any questions, please reach out  Toribio Nuria Love PA-C

## 2021-02-18 NOTE — DISCHARGE INSTRUCTIONS
End Stage Kidney Disease   WHAT YOU NEED TO KNOW:   End-stage kidney disease (ESRD) is when your kidney function is so poor that you need dialysis treatments or a kidney transplant to survive  ESRD usually occurs after long-term kidney disease  DISCHARGE INSTRUCTIONS:   Return to the emergency department if:   · You have shortness of breath or chest pain  · You have a rash, or a new wound that is very painful  · You have severe muscle cramps or pain  · Your heart is beating faster than normal for you  Contact your healthcare provider if:   · You urinate less than is normal for you  · You gain or lose more weight than your healthcare provider told you is okay  · You are more tired or drowsy  · You have increased nausea or vomiting  · You have pain that does not decrease, even after you take medicine  · You have questions or concerns about your condition or care  Medicines:   · Medicines  are given to decrease blood pressure, pain, or itching  You may also need medicine to decrease nausea, or to treat or prevent anemia (low number of red blood cells)  · Take your medicine as directed  Contact your healthcare provider if you think your medicine is not helping or if you have side effects  Tell him or her if you are allergic to any medicine  Keep a list of the medicines, vitamins, and herbs you take  Include the amounts, and when and why you take them  Bring the list or the pill bottles to follow-up visits  Carry your medicine list with you in case of an emergency  Follow up with your healthcare provider as directed: You will need to return for more tests  You may also need to return for routine dialysis treatments  Write down your questions so you remember to ask them during your visits  Manage ESRD:   · Protect your dialysis access site  Do not let anyone take blood or blood pressure readings on the arm where you have your arteriovenous fistula or graft   Cover your peritoneal catheter with a bandage  Do not touch the catheter  · Limit fluids to 1 liter a day (about 34 ounces) , or as directed by your healthcare provider  This can help you manage swelling between dialysis appointments  · Weigh yourself at the same time every day  Use the same scale, and wear the same amount of clothing  Record your weight and bring it with you to follow-up appointments  · Do not use NSAIDs or aspirin  They can increase the risk of bleeding in your stomach  Self-care:   · Manage other health conditions,  such as high blood pressure, diabetes, and heart disease  These conditions can make your ESRD worse  · Eat foods low in sodium, phosphorus, and potassium as directed  You may also need to eat foods high in protein  You may need to see a dietitian if you need help planning meals  · Maintain a healthy weight  Ask your healthcare provider how much you should weigh  Ask him to help you create a weight loss plan if you are overweight  · Exercise as directed  Regular exercise can help you manage conditions that occur with ESRD, such as high blood pressure and diabetes  Exercise may give you more energy and decrease constipation  Ask about the best exercise plan for you  · Limit alcohol  Ask how much alcohol is safe for you to drink  A drink of alcohol is 12 ounces of beer, 5 ounces of wine, or 1½ ounces of liquor  · Do not smoke  Nicotine and other chemicals in cigarettes and cigars can cause lung and kidney damage  Ask your healthcare provider for information if you currently smoke and need help to quit  E-cigarettes or smokeless tobacco still contain nicotine  Talk to your healthcare provider before you use these products  · Ask your healthcare provider if you need vaccines  Pneumonia, influenza, and hepatitis can be more harmful or more likely to occur when you have ESRD  Vaccines reduce your risk of infection with these viruses      © Copyright [employer] [address] [place] [address] Information is for [employer] use only and may not be sold, redistributed or otherwise used for commercial purposes  All illustrations and images included in CareNotes® are the copyrighted property of A D A M , Inc  or Vandana Sadler  The above information is an  only  It is not intended as medical advice for individual conditions or treatments  Talk to your doctor, nurse or pharmacist before following any medical regimen to see if it is safe and effective for you  Hypocalcemia   WHAT YOU NEED TO KNOW:   Hypocalcemia is a low level of calcium in your blood  It occurs when your body loses too much calcium or does not absorb enough from the foods you eat  DISCHARGE INSTRUCTIONS:   Medicines:   · Medicines  will be given to bring your calcium and vitamin D levels back to normal  You may also need medicines to prevent bone loss  · Take your medicine as directed  Contact your healthcare provider if you think your medicine is not helping or if you have side effects  Tell him of her if you are allergic to any medicine  Keep a list of the medicines, vitamins, and herbs you take  Include the amounts, and when and why you take them  Bring the list or the pill bottles to follow-up visits  Carry your medicine list with you in case of an emergency  Follow up with your healthcare provider or endocrinologist every 3 to 6 months, or as directed: You will need to return to have your calcium levels checked  Bring a list of any questions you have so you remember to ask them during your visits  Eat foods rich in calcium:  Foods that contain calcium include milk, yogurt, cereals, and cheese  Leafy green vegetables, oranges, canned salmon, shrimp, and peanuts also contain calcium  Do not have caffeine or alcohol  These can slow your body's ability to absorb calcium  You may need to meet with a dietitian to help plan the best meals for you  Get safe amounts of sunlight:   You may need to expose your skin to more sunlight if your body lacks vitamin D  Ask your healthcare provider how to safely expose yourself to UV light if you need it  Do not smoke: If you smoke, it is never too late to quit  Smoking increases the amount of calcium that leaves your body through your urine  Ask your healthcare provider for information if you need help quitting  Contact your healthcare provider or endocrinologist if:   · You have dry skin and brittle nails  · Your symptoms do not go away, or they get worse  · You feel depressed, anxious, angry, or confused  · You have questions or concerns about your condition or care  Return to the emergency department if:   · You have a seizure  · You have a slow or uneven heartbeat and feel lightheaded  · You see or hear things that are not really there  © Copyright [address] Information is for End User's use only and may not be sold, redistributed or otherwise used for commercial purposes  All illustrations and images included in CareNotes® are the copyrighted property of A D A M , Inc  or [address] [place], [place] [address]   The above information is an  only  It is not intended as medical advice for individual conditions or treatments  Talk to your doctor, nurse or pharmacist before following any medical regimen to see if it is safe and effective for you

## 2021-02-18 NOTE — DISCHARGE INSTR - AVS FIRST PAGE
Follow-up with PCP in 1 week  Follow-up with your Nephrology doctor in 1-2 weeks  BMP as outpatient to check calcium level  If symptoms persist, consider rheumatology evaluation as outpatient  Come back to the emergency room if condition worsen or recur  Monitor blood pressure closely at home  Follow-up with your PCP/Nephrology for that

## 2021-02-19 NOTE — UTILIZATION REVIEW
Notification of Discharge  This is a Notification of Discharge from our facility [address]  Please be advised that this patient has been discharge from our facility  Below you will find the admission and discharge date and time including the patients disposition  PRESENTATION DATE: 2/14/2021  2:30 PM  OBS ADMISSION DATE: 02/15/2021  IP ADMISSION DATE: 2/15/21 1515   DISCHARGE DATE: 2/18/2021 10:10 AM  DISPOSITION: Home with [facility] with [address] [facility]   Admission Orders listed below:  Admission Orders (From admission, onward)     Ordered        02/15/21 1515  Inpatient Admission  Once         02/15/21 0039  Place in Observation  Once                   Please contact the UR Department if additional information is required to close this patient's authorization/case  [facility] Utilization Review Department  Main: [phone] x carefully listen to the prompts  All voicemails are confidential   [email or web address]  Send all requests for admission clinical reviews, approved or denied determinations and any other requests to dedicated fax number below belonging to the campus where the patient is receiving treatment   List of dedicated fax numbers:  [address] [place] [address] DENIALS (Administrative/Medical Necessity) [phone]   [address] (Maternity/NICU/Pediatrics) [phone]   Kevin Cuadra [phone]     [address]  [phone]   [facility] [phone]   [address] [phone]   [facility]  [phone]   [address] [facility] [address]  [address] [phone]

## 2021-02-20 NOTE — ASSESSMENT & PLAN NOTE
Lab Results   Component Value Date    EGFR 5 02/18/2021    EGFR 6 02/17/2021    EGFR 3 02/17/2021    CREATININE 12 18 (H) 02/18/2021    CREATININE 10 99 (H) 02/17/2021    CREATININE 16 90 (H) 02/17/2021   · Continue maintenance hemodialysis

## 2021-02-20 NOTE — ASSESSMENT & PLAN NOTE
· Calcium level was not low on admission  · Given IV calcium and oral calcium supplement  · Improved to 7 5 today  · His symptoms also improved  Cleared by Nephrology for discharge

## 2021-02-20 NOTE — DISCHARGE SUMMARY
Discharge- Cruzito Fass 1975, 39 y o  male MRN: [id number]    Unit/Bed#: -01 Encounter: [id number]    Primary Care Provider: Phill Parekh DO   Date and time admitted to hospital: 2/14/2021  2:30 PM        QT prolongation  Assessment & Plan  · QT initially 526  · Avoid QT prolongation urgent    Shoulder pain  Assessment & Plan  · Likely from hypocalcemia  · Evaluated orthopedics  Chest pain  Assessment & Plan  · Initially on arrival complaining of left shoulder pain radiation with chest pain as well  · Upon evaluation patient denies any chest pain reports that did resolve quickly upon arrival several hours ago  · SUMMER 1  · Serial troponin negative, ACS ruled out  · Chest pain resolved now  · Follow-up with cardiology as outpatient    Anemia due to chronic kidney disease, on chronic dialysis [facility])  Assessment & Plan  Lab Results   Component Value Date    EGFR 5 02/18/2021    EGFR 6 02/17/2021    EGFR 3 02/17/2021    CREATININE 12 18 (H) 02/18/2021    CREATININE 10 99 (H) 02/17/2021    CREATININE 16 90 (H) 02/17/2021   · Stable monitor    Hypocalcemia  Assessment & Plan  · Calcium level was not low on admission  · Given IV calcium and oral calcium supplement  · Improved to 7 5 today  · His symptoms also improved  Cleared by Nephrology for discharge      End-stage renal disease on hemodialysis  Assessment & Plan  Lab Results   Component Value Date    EGFR 5 02/18/2021    EGFR 6 02/17/2021    EGFR 3 02/17/2021    CREATININE 12 18 (H) 02/18/2021    CREATININE 10 99 (H) 02/17/2021    CREATININE 16 90 (H) 02/17/2021   · Continue maintenance hemodialysis    Acute congestive heart failure ([address] )  Assessment & Plan  Wt Readings from Last 3 Encounters:   02/14/21 107 kg (236 lb 15 9 oz)   01/26/21 99 8 kg (220 lb 0 3 oz)   01/11/21 99 8 kg (220 lb)   · No signs of fluid overload at this time  · Cardiac diet  · Continue home Coreg          * Hypertensive urgency  Assessment & Plan  · Blood pressure remained stable on current regimen  He was on Coreg, hydralazine and Procardia XL          Discharging Physician / Practitioner: Alix Desouza MD  PCP: Keke Manning DO  Admission Date:   Admission Orders (From admission, onward)     Ordered        02/15/21 1515  Inpatient Admission  Once         02/15/21 0039  Place in Observation  Once                   Discharge Date: 02/18/21    Resolved Problems  Date Reviewed: 2/15/2021    None          Consultations During Hospital Stay:  · Nephrology, Orthopedics    Procedures Performed:   · CT head, x-ray shoulder    Significant Findings / Test Results:   ·     Incidental Findings:   ·      Test Results Pending at Discharge (will require follow up):   ·      Outpatient Tests Requested:  ·     Complications:      Reason for Admission:  Generalized weakness    Hospital Course:     April More is a 39 y o  male patient who originally presented to the hospital on 2/14/2021 due to generalized weakness  Found to have hypocalcemia and hypertensive urgency  Restarted on blood pressure medications  Blood pressure stabilized  Evaluated by Nephrology and started on hemodialysis  For hypocalcemia he was given IV calcium and started on supplement  He was complaining about shoulder pain  X-ray did not show any acute changes, evaluated by orthopedics  Today he feels much better  Pain controlled with oxycodone  Will give few days supply  Discharged on stable condition  Please see above list of diagnoses and related plan for additional information       Condition at Discharge: good     Discharge Day Visit / Exam:     Subjective:    Vitals: Blood Pressure: 149/91 (02/18/21 0735)  Pulse: 86 (02/17/21 1537)  Temperature: 98 5 °F (36 9 °C) (02/18/21 0735)  Temp Source: Oral (02/17/21 1537)  Respirations: 20 (02/18/21 0006)  Height: 5' 10" (177 8 cm) (02/14/21 1435)  Weight - Scale: 107 kg (236 lb 15 9 oz) (02/14/21 1435)  SpO2: 96 % (02/16/21 2304)  Exam:   Physical Exam  General- Awake, alert and oriented x 3, looks comfortable  HEENT- Normocephalic, atraumatic  CVS- Normal S1/ S2, Regular rate and rhythm,  No edema  Respiratory system- B/L clear breath sounds, no wheezing  CNS- No acute focal neurologic deficit noted  Discussion with Family:     Discharge instructions/Information to patient and family:   See after visit summary for information provided to patient and family  Provisions for Follow-Up Care:  See after visit summary for information related to follow-up care and any pertinent home health orders  Disposition:     Home    For Discharges to [facility] SNF:   · Not Applicable to this Patient - Not Applicable to this Patient    Planned Readmission:      Discharge Statement:  I spent 32 minutes discharging the patient  This time was spent on the day of discharge  I had direct contact with the patient on the day of discharge  Greater than 50% of the total time was spent examining patient, answering all patient questions, arranging and discussing plan of care with patient as well as directly providing post-discharge instructions  Additional time then spent on discharge activities  Discharge Medications:  See after visit summary for reconciled discharge medications provided to patient and family        ** Please Note: This note has been constructed using a voice recognition system **

## 2021-02-20 NOTE — ASSESSMENT & PLAN NOTE
Lab Results   Component Value Date    EGFR 5 02/18/2021    EGFR 6 02/17/2021    EGFR 3 02/17/2021    CREATININE 12 18 (H) 02/18/2021    CREATININE 10 99 (H) 02/17/2021    CREATININE 16 90 (H) 02/17/2021   · Stable monitor

## 2021-02-20 NOTE — ASSESSMENT & PLAN NOTE
· Blood pressure remained stable on current regimen    He was on Coreg, hydralazine and Procardia XL

## 2021-02-20 NOTE — ASSESSMENT & PLAN NOTE
· Initially on arrival complaining of left shoulder pain radiation with chest pain as well  · Upon evaluation patient denies any chest pain reports that did resolve quickly upon arrival several hours ago  · SUMMER 1  · Serial troponin negative, ACS ruled out    · Chest pain resolved now  · Follow-up with cardiology as outpatient

## 2021-03-15 ENCOUNTER — TRANSCRIBE ORDERS (OUTPATIENT)
Dept: NEUROSURGERY | Facility: CLINIC | Age: 46
End: 2021-03-15

## 2021-03-15 DIAGNOSIS — M54.2 NECK PAIN: Primary | ICD-10-CM

## 2021-03-23 ENCOUNTER — TELEPHONE (OUTPATIENT)
Dept: NEUROSURGERY | Facility: CLINIC | Age: 46
End: 2021-03-23

## 2021-03-23 NOTE — TELEPHONE ENCOUNTER
Spoke with pt and who stated that he is having the disc with his images mailed to him  Did state that unfortunately we would need the disc with the images for the appt  Apt tomorrow canceled and pt stated that he will call the office whenever the disc arrives for the apt

## 2021-04-09 ENCOUNTER — APPOINTMENT (EMERGENCY)
Dept: CT IMAGING | Facility: HOSPITAL | Age: 46
End: 2021-04-09
Payer: COMMERCIAL

## 2021-04-09 ENCOUNTER — HOSPITAL ENCOUNTER (EMERGENCY)
Facility: HOSPITAL | Age: 46
Discharge: HOME | End: 2021-04-09
Attending: EMERGENCY MEDICINE | Admitting: EMERGENCY MEDICINE
Payer: COMMERCIAL

## 2021-04-09 VITALS
TEMPERATURE: 97.9 F | RESPIRATION RATE: 15 BRPM | DIASTOLIC BLOOD PRESSURE: 79 MMHG | OXYGEN SATURATION: 99 % | HEIGHT: 70 IN | BODY MASS INDEX: 30.61 KG/M2 | WEIGHT: 213.85 LBS | HEART RATE: 87 BPM | SYSTOLIC BLOOD PRESSURE: 162 MMHG

## 2021-04-09 DIAGNOSIS — H53.8 BLURRY VISION: Primary | ICD-10-CM

## 2021-04-09 PROCEDURE — 99283 EMERGENCY DEPT VISIT LOW MDM: CPT

## 2021-04-09 PROCEDURE — 99282 EMERGENCY DEPT VISIT SF MDM: CPT | Performed by: EMERGENCY MEDICINE

## 2021-04-09 PROCEDURE — 70450 CT HEAD/BRAIN W/O DYE: CPT

## 2021-04-09 NOTE — ED PROVIDER NOTES
History  Chief Complaint   Patient presents with    Evaluation of Abnormal Diagnostic Test     Pt went to get an eye exam and   referred him to get a CT head scan Pt denies pain     38 yo male on HD for ESRD who presents to the ED for evaluation of blurry vision x 1 year  States it began when his glasses broke  Went to eye doctor today who sent ED for evaluation of possible papilledema and related recommendation to perform head CT  Pt denies HA  Denies neuro sxs aside from one year of blurry vision since breaking his glasses  No other sxs or concerns at this time  Ambulates without difficulty  Prior to Admission Medications   Prescriptions Last Dose Informant Patient Reported? Taking?    Calcium Carbonate Antacid 1250 mg/5 mL   No No   Sig: Take 5 mL (1,250 mg total) by mouth 3 (three) times a day with meals   NIFEdipine (PROCARDIA XL) 90 mg 24 hr tablet  Self Yes No   Sig: Take 90 mg by mouth daily   calcitriol (ROCALTROL) 0 5 MCG capsule  Self No No   Sig: Take 2 capsules (1 mcg total) by mouth daily   carvedilol (COREG) 25 mg tablet  Self No No   Sig: Take 1 tablet (25 mg total) by mouth 2 (two) times a day with meals   diazepam (Valium) 5 mg tablet   No No   Sig: Take 1 tablet (5 mg total) by mouth every 8 (eight) hours as needed for muscle spasms   hydrALAZINE (APRESOLINE) 25 mg tablet  Self No No   Sig: Take 3 tablets (75 mg total) by mouth every 8 (eight) hours   lidocaine (LIDODERM) 5 %   No No   Sig: Apply 1 patch topically daily Remove & Discard patch within 12 hours or as directed by MD      Facility-Administered Medications: None       Past Medical History:   Diagnosis Date    Chronic kidney disease     Hypertension     Pressure injury of skin     Renal disorder     dialysis        Past Surgical History:   Procedure Laterality Date    CARDIAC DEFIBRILLATOR PLACEMENT      IR AV FISTULAGRAM/GRAFTOGRAM  1/18/2019    IR TUNNELED CENTRAL LINE PLACEMENT  10/29/2020    IA EXPLORE PARATHYROID GLANDS N/A 10/22/2020    Procedure: Three gland PARATHYROIDECTOMY, four gland exploration, intraoperative PTH monitoring;  Surgeon: Luis Jacob MD;  [facility]: [facility];  Service: Surgical Oncology    SPLIT THICKNESS SKIN GRAFT Left 12/2/2020    Procedure: SKIN GRAFT SPLIT THICKNESS (STSG)  LEFT ARM;  Surgeon: Neha Negrete MD;  [facility];  Service: Plastics    WOUND DEBRIDEMENT Left 12/2/2020    Procedure: DEBRIDEMENT OF LEFT ARM WOUND;  Surgeon: Neha Negrete MD;  [facility];  Service: Plastics       History reviewed  No pertinent family history  I have reviewed and agree with the history as documented  E-Cigarette/Vaping    E-Cigarette Use Never User      E-Cigarette/Vaping Substances    Nicotine No     THC No     CBD No     Flavoring No     Other No     Unknown No      Social History     Tobacco Use    Smoking status: Former Smoker     Quit date: 6/15/2010     Years since quitting: 10 8    Smokeless tobacco: Never Used   Substance Use Topics    Alcohol use: Not Currently     Frequency: Never    Drug use: Never       Review of Systems   Eyes: Positive for visual disturbance  All other systems reviewed and are negative  Physical Exam  Physical Exam  Vitals signs and nursing note reviewed  Constitutional:       General: He is not in acute distress  Appearance: Normal appearance  He is well-developed  He is not ill-appearing, toxic-appearing or diaphoretic  HENT:      Head: Normocephalic and atraumatic  Eyes:      General:         Right eye: No discharge  Left eye: No discharge  Conjunctiva/sclera: Conjunctivae normal       Pupils: Pupils are equal, round, and reactive to light  Neck:      Musculoskeletal: Normal range of motion and neck supple  No neck rigidity  Vascular: No carotid bruit or JVD  Pulmonary:      Effort: Pulmonary effort is normal  No respiratory distress  Breath sounds: No stridor     Musculoskeletal: Normal range of motion  General: No deformity  Skin:     General: Skin is warm and dry  Capillary Refill: Capillary refill takes less than 2 seconds  Coloration: Skin is not jaundiced or pale  Findings: No bruising, erythema, lesion or rash  Neurological:      General: No focal deficit present  Mental Status: He is alert and oriented to person, place, and time  Cranial Nerves: No cranial nerve deficit  Sensory: No sensory deficit  Motor: No weakness or abnormal muscle tone  Coordination: Coordination normal       Gait: Gait normal       Deep Tendon Reflexes: Reflexes normal          Vital Signs  ED Triage Vitals [04/09/21 1049]   Temperature Pulse Respirations Blood Pressure SpO2   97 9 °F (36 6 °C) 87 15 162/79 99 %      Temp Source Heart Rate Source Patient Position - Orthostatic VS BP Location FiO2 (%)   Oral Monitor -- Right arm --      Pain Score       --           Vitals:    04/09/21 1049   BP: 162/79   Pulse: 87         Visual Acuity      ED Medications  Medications - No data to display    Diagnostic Studies  Results Reviewed     None                 CT head without contrast   Final Result by Kunal Dill MD (04/09 1146)      No acute intracranial abnormality  Workstation performed: [id number]                    Procedures  Procedures         ED Course                             SBIRT 20yo+      Most Recent Value   SBIRT (22 yo +)   In order to provide better care to our patients, we are screening all of our patients for alcohol and drug use  Would it be okay to ask you these screening questions? Yes Filed at: 04/09/2021 1122   Initial Alcohol Screen: US AUDIT-C    1  How often do you have a drink containing alcohol?  0 Filed at: 04/09/2021 1122   2  How many drinks containing alcohol do you have on a typical day you are drinking? 0 Filed at: 04/09/2021 1122   3a  Male UNDER 65:  How often do you have five or more drinks on one occasion?  0 Filed at: 04/09/2021 1122   3b  FEMALE Any Age, or MALE 65+: How often do you have 4 or more drinks on one occassion? 0 Filed at: 04/09/2021 1122   Audit-C Score  0 Filed at: 04/09/2021 1122   GABY: How many times in the past year have you    Used an illegal drug or used a prescription medication for non-medical reasons? Never Filed at: 04/09/2021 1122                    MDM    Disposition  Final diagnoses:   Blurry vision     Time reflects when diagnosis was documented in both MDM as applicable and the Disposition within this note     Time User Action Codes Description Comment    4/9/2021 11:52 AM Annette Richardson Add [H53 8] Blurry vision       ED Disposition     ED Disposition Condition Date/Time Comment    Discharge Stable Fri Apr 9, 2021 11:52 AM Fer Ramirez discharge to home/self care              Follow-up Information     Follow up With Specialties Details Why Contact Info Additional Information    [address] [place] [address] Emergency Department Emergency Medicine  If symptoms worsen [address] Emergency Department, [address] [place] [address], [facility]          Discharge Medication List as of 4/9/2021 11:53 AM      CONTINUE these medications which have NOT CHANGED    Details   calcitriol (ROCALTROL) 0 5 MCG capsule Take 2 capsules (1 mcg total) by mouth daily, Starting Fri 10/30/2020, Normal      Calcium Carbonate Antacid 1250 mg/5 mL Take 5 mL (1,250 mg total) by mouth 3 (three) times a day with meals, Starting Thu 2/18/2021, Until Sat 3/20/2021, Normal      carvedilol (COREG) 25 mg tablet Take 1 tablet (25 mg total) by mouth 2 (two) times a day with meals, Starting Mon 7/6/2020, Until Wed 11/25/2020, Normal      diazepam (Valium) 5 mg tablet Take 1 tablet (5 mg total) by mouth every 8 (eight) hours as needed for muscle spasms, Starting Tue 1/26/2021, Print      hydrALAZINE (APRESOLINE) 25 mg tablet Take 3 tablets (75 mg total) by mouth every 8 (eight) hours, Starting Mon 7/6/2020, Until Wed 11/25/2020, Normal      lidocaine (LIDODERM) 5 % Apply 1 patch topically daily Remove & Discard patch within 12 hours or as directed by MD, Starting Wed 1/27/2021, Normal      NIFEdipine (PROCARDIA XL) 90 mg 24 hr tablet Take 90 mg by mouth daily, Starting Tue 5/5/2020, Until Wed 5/5/2021, Historical Med           No discharge procedures on file      PDMP Review       Value Time User    PDMP Reviewed  Yes 2/16/2021  8:34 AM Odalis Gaming MD          ED Provider  Electronically Signed by           Denise Luna MD  04/09/21 7710

## 2021-04-13 ENCOUNTER — APPOINTMENT (EMERGENCY)
Dept: RADIOLOGY | Facility: HOSPITAL | Age: 46
End: 2021-04-13
Payer: COMMERCIAL

## 2021-04-13 ENCOUNTER — APPOINTMENT (OUTPATIENT)
Dept: DIALYSIS | Facility: HOSPITAL | Age: 46
End: 2021-04-13
Payer: COMMERCIAL

## 2021-04-13 ENCOUNTER — HOSPITAL ENCOUNTER (OUTPATIENT)
Facility: HOSPITAL | Age: 46
Setting detail: OBSERVATION
Discharge: HOME | End: 2021-04-13
Attending: EMERGENCY MEDICINE | Admitting: FAMILY MEDICINE
Payer: COMMERCIAL

## 2021-04-13 VITALS
DIASTOLIC BLOOD PRESSURE: 93 MMHG | OXYGEN SATURATION: 97 % | RESPIRATION RATE: 17 BRPM | BODY MASS INDEX: 30.61 KG/M2 | HEIGHT: 70 IN | WEIGHT: 213.85 LBS | SYSTOLIC BLOOD PRESSURE: 174 MMHG | TEMPERATURE: 97.6 F | HEART RATE: 88 BPM

## 2021-04-13 DIAGNOSIS — N18.6 ESRD (END STAGE RENAL DISEASE) ON DIALYSIS (HCC): ICD-10-CM

## 2021-04-13 DIAGNOSIS — Z99.2 ESRD (END STAGE RENAL DISEASE) ON DIALYSIS (HCC): ICD-10-CM

## 2021-04-13 DIAGNOSIS — I50.33 ACUTE ON CHRONIC DIASTOLIC CONGESTIVE HEART FAILURE (HCC): ICD-10-CM

## 2021-04-13 DIAGNOSIS — I50.9 CHF (CONGESTIVE HEART FAILURE) (HCC): Primary | ICD-10-CM

## 2021-04-13 PROBLEM — R65.10 SIRS DUE TO NON-INFECTIOUS PROCESS WITHOUT ACUTE ORGAN DYSFUNCTION (HCC): Status: ACTIVE | Noted: 2021-04-13

## 2021-04-13 LAB
ALBUMIN SERPL BCP-MCNC: 3.1 G/DL (ref 3.5–5)
ALP SERPL-CCNC: 316 U/L (ref 46–116)
ALT SERPL W P-5'-P-CCNC: 19 U/L (ref 12–78)
ANION GAP SERPL CALCULATED.3IONS-SCNC: 16 MMOL/L (ref 4–13)
AST SERPL W P-5'-P-CCNC: 34 U/L (ref 5–45)
ATRIAL RATE: 112 BPM
BASOPHILS # BLD AUTO: 0.06 THOUSANDS/ΜL (ref 0–0.1)
BASOPHILS NFR BLD AUTO: 1 % (ref 0–1)
BILIRUB SERPL-MCNC: 0.41 MG/DL (ref 0.2–1)
BUN SERPL-MCNC: 50 MG/DL (ref 5–25)
CALCIUM ALBUM COR SERPL-MCNC: 8.2 MG/DL (ref 8.3–10.1)
CALCIUM SERPL-MCNC: 7.5 MG/DL (ref 8.3–10.1)
CHLORIDE SERPL-SCNC: 100 MMOL/L (ref 100–108)
CO2 SERPL-SCNC: 24 MMOL/L (ref 21–32)
CREAT SERPL-MCNC: 12.51 MG/DL (ref 0.6–1.3)
EOSINOPHIL # BLD AUTO: 0.29 THOUSAND/ΜL (ref 0–0.61)
EOSINOPHIL NFR BLD AUTO: 4 % (ref 0–6)
ERYTHROCYTE [DISTWIDTH] IN BLOOD BY AUTOMATED COUNT: 17.8 % (ref 11.6–15.1)
FLUAV RNA RESP QL NAA+PROBE: NEGATIVE
FLUBV RNA RESP QL NAA+PROBE: NEGATIVE
GFR SERPL CREATININE-BSD FRML MDRD: 5 ML/MIN/1.73SQ M
GLUCOSE SERPL-MCNC: 143 MG/DL (ref 65–140)
HCT VFR BLD AUTO: 28 % (ref 36.5–49.3)
HGB BLD-MCNC: 7.9 G/DL (ref 12–17)
IMM GRANULOCYTES # BLD AUTO: 0.04 THOUSAND/UL (ref 0–0.2)
IMM GRANULOCYTES NFR BLD AUTO: 1 % (ref 0–2)
LYMPHOCYTES # BLD AUTO: 1.61 THOUSANDS/ΜL (ref 0.6–4.47)
LYMPHOCYTES NFR BLD AUTO: 21 % (ref 14–44)
MCH RBC QN AUTO: 26.5 PG (ref 26.8–34.3)
MCHC RBC AUTO-ENTMCNC: 28.2 G/DL (ref 31.4–37.4)
MCV RBC AUTO: 94 FL (ref 82–98)
MONOCYTES # BLD AUTO: 0.61 THOUSAND/ΜL (ref 0.17–1.22)
MONOCYTES NFR BLD AUTO: 8 % (ref 4–12)
NEUTROPHILS # BLD AUTO: 5.05 THOUSANDS/ΜL (ref 1.85–7.62)
NEUTS SEG NFR BLD AUTO: 65 % (ref 43–75)
NRBC BLD AUTO-RTO: 0 /100 WBCS
NT-PROBNP SERPL-MCNC: 7710 PG/ML
P AXIS: 70 DEGREES
PLATELET # BLD AUTO: 316 THOUSANDS/UL (ref 149–390)
PMV BLD AUTO: 10.9 FL (ref 8.9–12.7)
POTASSIUM SERPL-SCNC: 4.8 MMOL/L (ref 3.5–5.3)
PR INTERVAL: 156 MS
PROT SERPL-MCNC: 8.2 G/DL (ref 6.4–8.2)
QRS AXIS: 52 DEGREES
QRSD INTERVAL: 88 MS
QT INTERVAL: 366 MS
QTC INTERVAL: 499 MS
RBC # BLD AUTO: 2.98 MILLION/UL (ref 3.88–5.62)
RSV RNA RESP QL NAA+PROBE: NEGATIVE
SARS-COV-2 RNA RESP QL NAA+PROBE: NEGATIVE
SODIUM SERPL-SCNC: 140 MMOL/L (ref 136–145)
T WAVE AXIS: 64 DEGREES
TROPONIN I SERPL-MCNC: <0.02 NG/ML
TROPONIN I SERPL-MCNC: <0.02 NG/ML
VENTRICULAR RATE: 112 BPM
WBC # BLD AUTO: 7.66 THOUSAND/UL (ref 4.31–10.16)

## 2021-04-13 PROCEDURE — 83880 ASSAY OF NATRIURETIC PEPTIDE: CPT | Performed by: PHYSICIAN ASSISTANT

## 2021-04-13 PROCEDURE — 99285 EMERGENCY DEPT VISIT HI MDM: CPT | Performed by: PHYSICIAN ASSISTANT

## 2021-04-13 PROCEDURE — 84484 ASSAY OF TROPONIN QUANT: CPT | Performed by: PHYSICIAN ASSISTANT

## 2021-04-13 PROCEDURE — 0241U HB NFCT DS VIR RESP RNA 4 TRGT: CPT | Performed by: PHYSICIAN ASSISTANT

## 2021-04-13 PROCEDURE — 71045 X-RAY EXAM CHEST 1 VIEW: CPT

## 2021-04-13 PROCEDURE — 36415 COLL VENOUS BLD VENIPUNCTURE: CPT | Performed by: PHYSICIAN ASSISTANT

## 2021-04-13 PROCEDURE — 90935 HEMODIALYSIS ONE EVALUATION: CPT | Performed by: INTERNAL MEDICINE

## 2021-04-13 PROCEDURE — 85025 COMPLETE CBC W/AUTO DIFF WBC: CPT | Performed by: PHYSICIAN ASSISTANT

## 2021-04-13 PROCEDURE — 84484 ASSAY OF TROPONIN QUANT: CPT | Performed by: INTERNAL MEDICINE

## 2021-04-13 PROCEDURE — 93010 ELECTROCARDIOGRAM REPORT: CPT | Performed by: INTERNAL MEDICINE

## 2021-04-13 PROCEDURE — 99220 PR INITIAL OBSERVATION CARE/DAY 70 MINUTES: CPT | Performed by: NURSE PRACTITIONER

## 2021-04-13 PROCEDURE — 90935 HEMODIALYSIS ONE EVALUATION: CPT

## 2021-04-13 PROCEDURE — 93005 ELECTROCARDIOGRAM TRACING: CPT

## 2021-04-13 PROCEDURE — 99285 EMERGENCY DEPT VISIT HI MDM: CPT

## 2021-04-13 PROCEDURE — 80053 COMPREHEN METABOLIC PANEL: CPT | Performed by: PHYSICIAN ASSISTANT

## 2021-04-13 RX ORDER — ACETAMINOPHEN 325 MG/1
650 TABLET ORAL EVERY 6 HOURS PRN
Status: DISCONTINUED | OUTPATIENT
Start: 2021-04-13 | End: 2021-04-13 | Stop reason: HOSPADM

## 2021-04-13 RX ORDER — HEPARIN SODIUM 5000 [USP'U]/ML
5000 INJECTION, SOLUTION INTRAVENOUS; SUBCUTANEOUS EVERY 8 HOURS SCHEDULED
Status: DISCONTINUED | OUTPATIENT
Start: 2021-04-13 | End: 2021-04-13 | Stop reason: HOSPADM

## 2021-04-13 RX ORDER — CALCITRIOL 0.25 UG/1
1 CAPSULE, LIQUID FILLED ORAL DAILY
Status: DISCONTINUED | OUTPATIENT
Start: 2021-04-13 | End: 2021-04-13 | Stop reason: HOSPADM

## 2021-04-13 RX ORDER — DIAZEPAM 5 MG/1
5 TABLET ORAL EVERY 8 HOURS PRN
Status: DISCONTINUED | OUTPATIENT
Start: 2021-04-13 | End: 2021-04-13 | Stop reason: HOSPADM

## 2021-04-13 RX ADMIN — HEPARIN SODIUM 5000 UNITS: 5000 INJECTION INTRAVENOUS; SUBCUTANEOUS at 13:50

## 2021-04-13 RX ADMIN — CALCITRIOL CAPSULES 0.25 MCG 1 MCG: 0.25 CAPSULE ORAL at 12:46

## 2021-04-13 RX ADMIN — NIFEDIPINE 90 MG: 60 TABLET, FILM COATED, EXTENDED RELEASE ORAL at 12:46

## 2021-04-13 NOTE — PLAN OF CARE
Post-Dialysis RN Treatment Note    Blood Pressure:  Pre: 142/85 mm/Hg  Post: 172/103 mmHg   EDW: 97 0kg (per patient)   Weight:  Pre: 97 0 kg   Post:  100 0 kg   Mode of weight measurement: Weight discrepancy from ED, patient weighed on standing scale post treatment  Volume Removed:  2400 ml    Treatment duration: 210 minutes    NS given: No   Treatment shortened?  Yes, patient request, system clotted   Medications given during Rx None Reported   Estimated Kt/V  Not Applicable   Access type: AV fistula   Access Status: Yes, describe: Ordered BFR maintained     Report called to primary nurse   Yes, ED staff    Problem: METABOLIC, FLUID AND ELECTROLYTES - ADULT  Goal: Electrolytes maintained within normal limits  Description: INTERVENTIONS:  - Monitor labs and assess patient for signs and symptoms of electrolyte imbalances  - Administer electrolyte replacement as ordered  - Monitor response to electrolyte replacements, including repeat lab results as appropriate  - Instruct patient on fluid and nutrition as appropriate  Outcome: Progressing  Goal: Fluid balance maintained  Description: INTERVENTIONS:  - Monitor labs   - Monitor I/O and WT  - Instruct patient on fluid and nutrition as appropriate  - Assess for signs & symptoms of volume excess or deficit  Outcome: Progressing     Problem: HEMATOLOGIC - ADULT  Goal: Maintains hematologic stability  Description: INTERVENTIONS  - Assess for signs and symptoms of bleeding or hemorrhage  - Monitor labs  - Administer supportive blood products/factors as ordered and appropriate  Outcome: Progressing

## 2021-04-13 NOTE — ASSESSMENT & PLAN NOTE
Lab Results   Component Value Date    EGFR 5 04/13/2021    EGFR 5 02/18/2021    EGFR 6 02/17/2021    CREATININE 12 51 (H) 04/13/2021    CREATININE 12 18 (H) 02/18/2021    CREATININE 10 99 (H) 02/17/2021   Known history of anemia in setting of chronic kidney disease  Current hemoglobin 7 9  Transfuse if less than 7  Continue to monitor

## 2021-04-13 NOTE — DISCHARGE INSTRUCTIONS
Chronic Kidney Disease   WHAT YOU NEED TO KNOW:   Chronic kidney disease (CKD) is the gradual and permanent loss of kidney function  It is also called chronic kidney failure, or chronic renal insufficiency  Normally, the kidneys remove fluid, chemicals, and waste from your blood  These wastes are turned into urine by your kidneys  CKD may worsen over time and lead to kidney failure  Your CKD team will help you and your family plan for your care at home  The team will help you create goals and find ways to meet your goals  Your care plan may change over time as your needs change  DISCHARGE INSTRUCTIONS:   Call your local emergency number (911 in the [address]) if:   · You have a seizure  · You have shortness of breath  Call your doctor or nephrologist if:   · You are confused and very drowsy  · You suddenly gain or lose more weight than your healthcare provider has told you is okay  · You have itchy skin or a rash  · You urinate more or less than you normally do  · You have blood in your urine  · You have nausea and are vomiting  · You have fatigue or muscle weakness  · You have hiccups that will not stop  · You have questions or concerns about your condition or care  Medicines:   · Medicines  may be given to decrease blood pressure and get rid of extra fluid  You may also receive medicine to manage health conditions that may occur with CKD, such as anemia, diabetes, and heart disease  · Take your medicine as directed  Contact your healthcare provider if you think your medicine is not helping or if you have side effects  Tell him or her if you are allergic to any medicine  Keep a list of the medicines, vitamins, and herbs you take  Include the amounts, and when and why you take them  Bring the list or the pill bottles to follow-up visits  Carry your medicine list with you in case of an emergency  What you can do to manage CKD:   Management may include making some lifestyle changes  Tell your healthcare provider if you have any concerns about being able to make the changes  He or she can help you find solutions, including working with specialists  Ask for help creating a plan to break large goals into smaller steps  Your plan may include any of the following:  · Manage other health conditions  Your healthcare provider will work with you to make a care plan that meets your needs  You will be checked regularly for heart disease or other conditions that can make CKD worse, such as diabetes  Your blood pressure will be closely monitored  You will also get a target blood pressure and help making a plan to reach your target  This may include taking your blood pressure at home  · Maintain a healthy weight  Extra weight can strain your kidneys  Ask what a healthy weight is for you  Your provider can help you create a weight loss plan if you are overweight  · Create an exercise plan  Regular exercise can help you manage CKD, high blood pressure, and diabetes  Exercise also helps control weight  Your provider can help you create exercise goals and a plan to reach those goals  For example, your goal may be to exercise for 30 minutes in a day  Your plan can include breaking exercise into 10 minute sessions, 3 times during the day  · Create a healthy eating plan  Your provider may tell you to eat food low in sodium (salt), potassium, phosphorus, or protein  A dietitian can help you plan meals if needed  Ask how much liquid to drink each day and which liquids are best for you  · Limit alcohol as directed  Alcohol can cause fluid retention and can affect your kidneys  Ask how much alcohol is safe for you  A drink of alcohol is 12 ounces of beer, 5 ounces of wine, or 1½ ounces of liquor  · Do not smoke  Nicotine and other chemicals in cigarettes and cigars can cause kidney damage  Ask your provider for information if you currently smoke and need help to quit  E-cigarettes or smokeless tobacco still contain nicotine  Talk to your provider before you use these products  · Ask about over-the-counter medicines  Medicines such as NSAIDs and laxatives may harm your kidneys  Some cough and cold medicines can raise your blood pressure  Always ask if a medicine is safe before you take it  · Ask about vaccines you may need  Infections such as pneumonia, influenza, and hepatitis can be more harmful or more likely to occur in a person who has CKD  Vaccines lower your risk for infection  Follow up with your doctor as directed: You will need to return for tests to monitor your kidney and nerve function, and your parathyroid hormone level  Your medicines may be changed, based on certain test results  You may also be referred to a nephrologist (kidney specialist)  Write down your questions so you remember to ask them during your visits  © Copyright [address] Information is for End User's use only and may not be sold, redistributed or otherwise used for commercial purposes  All illustrations and images included in CareNotes® are the copyrighted property of A D A M [employer]  or [address] [place], [place] [address]   The above information is an  only  It is not intended as medical advice for individual conditions or treatments  Talk to your doctor, nurse or pharmacist before following any medical regimen to see if it is safe and effective for you  Anemia   WHAT YOU NEED TO KNOW:   Anemia is a low number of red blood cells or a low amount of hemoglobin in your red blood cells  Hemoglobin is a protein that helps carry oxygen throughout your body  Red blood cells use iron to create hemoglobin  Anemia may develop if your body does not have enough iron  It may also develop if your body does not make enough red blood cells or they die faster than your body can make them     DISCHARGE INSTRUCTIONS:   Call 911 or have someone call 911 for any of the following:   · You lose consciousness  · You have severe chest pain  Seek care immediately if:   · You have dark or bloody bowel movements  Contact your healthcare provider if:   · Your symptoms are worse, even after treatment  · You have questions or concerns about your condition or care  Medicines:   · Iron or folic acid supplements  help increase your red blood cell and hemoglobin levels  · Vitamin B12 injections  may help boost your red blood cell level and decrease your symptoms  Ask your healthcare provider how to inject B12  · Take your medicine as directed  Contact your healthcare provider if you think your medicine is not helping or if you have side effects  Tell him of her if you are allergic to any medicine  Keep a list of the medicines, vitamins, and herbs you take  Include the amounts, and when and why you take them  Bring the list or the pill bottles to follow-up visits  Carry your medicine list with you in case of an emergency  Prevent anemia:  Eat healthy foods rich in iron and vitamin C  Nuts, meat, dark leafy green vegetables, and beans are high in iron and protein  Vitamin C helps your body absorb iron  Foods rich in vitamin C include oranges and other citrus fruits  Ask your healthcare provider for a list of other foods that are high in iron or vitamin C  Ask if you need to be on a special diet  Follow up with your healthcare provider as directed:  Write down your questions so you remember to ask them during your visits  © Copyright [address] Information is for End User's use only and may not be sold, redistributed or otherwise used for commercial purposes  All illustrations and images included in CareNotes® are the copyrighted property of A D A M , Inc  or [facility]   The above information is an  only  It is not intended as medical advice for individual conditions or treatments   Talk to your doctor, nurse or pharmacist before following any medical regimen to see if it is safe and effective for you  Chronic Hypertension   WHAT YOU NEED TO KNOW:   Hypertension is high blood pressure  Your blood pressure is the force of your blood moving against the walls of your arteries  Hypertension causes your blood pressure to get so high that your heart has to work much harder than normal  This can damage your heart  Even if you have hypertension for years, lifestyle changes, medicines, or both can help bring your blood pressure to normal   DISCHARGE INSTRUCTIONS:   Call 911 for any of the following:   · You have chest pain  · You have any of the following signs of a heart attack:      ? Squeezing, pressure, or pain in your chest    ? You may  also have any of the following:     § Discomfort or pain in your back, neck, jaw, stomach, or arm    § Shortness of breath    § Nausea or vomiting    § Lightheadedness or a sudden cold sweat    · You become confused or have difficulty speaking  · You suddenly feel lightheaded or have trouble breathing  Return to the emergency department if:   · You have a severe headache or vision loss  · You have weakness in an arm or leg  Contact your healthcare provider if:   · You feel faint, dizzy, confused, or drowsy  · You have been taking your blood pressure medicine but your pressure is higher than your provider says it should be  · You have questions or concerns about your condition or care  Medicines: You may need any of the following:  · Antihypertensives  may be used to help lower your blood pressure  Several kinds of medicines are available  Your healthcare provider may change the medicine or medicines you currently take  This may be needed if your blood pressure is often high when you check it at home or you are having other problems with blood pressure control  · Diuretics  help decrease extra fluid that collects in your body  This will help lower your BP   You may urinate more often while you take this medicine  · Cholesterol medicine  helps lower your cholesterol level  A low cholesterol level helps prevent heart disease and makes it easier to control your blood pressure  · Take your medicine as directed  Contact your healthcare provider if you think your medicine is not helping or if you have side effects  Tell him or her if you are allergic to any medicine  Keep a list of the medicines, vitamins, and herbs you take  Include the amounts, and when and why you take them  Bring the list or the pill bottles to follow-up visits  Carry your medicine list with you in case of an emergency  Follow up with your healthcare provider as directed: You will need to return to have your blood pressure checked and to have other lab tests done  Write down your questions so you remember to ask them during your visits  Stages of hypertension:       · Normal blood pressure is 119/79 or lower   Your healthcare provider may only check your blood pressure each year if it stays at a normal level  · Elevated blood pressure is 120/79 to 129/79   This is sometimes called prehypertension  Your healthcare provider may suggest lifestyle changes to help lower your blood pressure to a normal level  He or she may then check it again in 3 to 6 months  · Stage 1 hypertension is 130/80  to 139/89   Your provider may recommend lifestyle changes, medication, and checks every 3 to 6 months until your blood pressure is controlled  · Stage 2 hypertension is 140/90 or higher   Your provider will recommend lifestyle changes and have you take 2 kinds of hypertension medicines  You will also need to have your blood pressure checked monthly until it is controlled  Manage chronic hypertension:   · Check your blood pressure at home  Avoid smoking, caffeine, and exercise at least 30 minutes before checking your blood pressure  Sit and rest for 5 minutes before you take your blood pressure   Extend your arm and support it on a flat surface  Your arm should be at the same level as your heart  Follow the directions that came with your blood pressure monitor  Check your blood pressure 2 times, 1 minute apart, before you take your medicine in the morning  Also check your blood pressure before your evening meal  Keep a record of your readings and bring it to your follow-up visits  Ask your healthcare provider what your blood pressure should be  · Manage any other health conditions you have  Health conditions such as diabetes can increase your risk for hypertension  Follow your healthcare provider's instructions and take all your medicines as directed  Talk to your healthcare provider about any new health conditions you have recently developed  · Ask about all medicines  Certain medicines can increase your blood pressure  Examples include oral birth control pills, decongestants, herbal supplements, and NSAIDs, such as ibuprofen  Your healthcare provider can tell you which medicines are safe for you to take  This includes prescription and over-the-counter medicines  Lifestyle changes you can make to lower your blood pressure: Your provider may want you to make more lifestyle changes if you are having trouble controlling your blood pressure  This may feel difficult over time, especially if you think you are making good changes but your pressure is still high  It might help to focus on one new change at a time  For example, try to add 1 more day of exercise, or exercise for an extra 10 minutes on 2 days  Small changes can make a big difference  Your healthcare provider can also refer you to specialists such as a dietitian who can help you make small changes  · Limit sodium (salt) as directed  Too much sodium can affect your fluid balance  Check labels to find low-sodium or no-salt-added foods  Some low-sodium foods use potassium salts for flavor  Too much potassium can also cause health problems   Your healthcare provider will tell you how much sodium and potassium are safe for you to have in a day  He or she may recommend that you limit sodium to 2,300 mg a day  · Follow the meal plan recommended by your healthcare provider  A dietitian or your provider can give you more information on low-sodium plans or the DASH (Dietary Approaches to Stop Hypertension) eating plan  The DASH plan is low in sodium, unhealthy fats, and total fat  It is high in potassium, calcium, and fiber  · Exercise to maintain a healthy weight  Exercise at least 30 minutes per day, on most days of the week  This will help decrease your blood pressure  Ask your healthcare provider about the best exercise plan for you  · Decrease stress  This may help lower your blood pressure  Learn ways to relax, such as deep breathing or listening to music  · Limit alcohol as directed  Alcohol can increase your blood pressure  A drink of alcohol is 12 ounces of beer, 5 ounces of wine, or 1½ ounces of liquor  · Do not smoke  Nicotine and other chemicals in cigarettes and cigars can increase your blood pressure and also cause lung damage  Ask your healthcare provider for information if you currently smoke and need help to quit  E-cigarettes or smokeless tobacco still contain nicotine  Talk to your healthcare provider before you use these products  © Copyright [address] [place] [address] Drive Information is for End User's use only and may not be sold, redistributed or otherwise used for commercial purposes  All illustrations and images included in CareNotes® are the copyrighted property of [employer]  or [facility]inal [employer]   The above information is an  only  It is not intended as medical advice for individual conditions or treatments  Talk to your doctor, nurse or pharmacist before following any medical regimen to see if it is safe and effective for you

## 2021-04-13 NOTE — ASSESSMENT & PLAN NOTE
Lab Results   Component Value Date    EGFR 5 04/13/2021    EGFR 5 02/18/2021    EGFR 6 02/17/2021    CREATININE 12 51 (H) 04/13/2021    CREATININE 12 18 (H) 02/18/2021    CREATININE 10 99 (H) 02/17/2021   Usually has dialysis Tuesday Thursday Saturday  Patient denies any missed treatments  Consult Nephrology for management

## 2021-04-13 NOTE — DISCHARGE INSTR - AVS FIRST PAGE
Thank you for choosing [id number] [facility] for year care, please take all prescriptions as instructed, please make appropriate follow-up visits

## 2021-04-13 NOTE — H&P
[address] [facility]  H&P- Vinny Zaragoza 1975, 39 y o  male MRN: [id number]  Unit/Bed#: ED 19 Encounter: [id number]  Primary Care Provider: Bella Zavaleta DO   Date and time admitted to hospital: 4/13/2021  4:01 AM    * Acute on chronic diastolic congestive heart failure (HCC)  Assessment & Plan  Wt Readings from Last 3 Encounters:   04/09/21 97 kg (213 lb 13 5 oz)   02/14/21 107 kg (236 lb 15 9 oz)   01/26/21 99 8 kg (220 lb 0 3 oz)     Present on admission known history with last ejection fraction being 66% grade 1 diastolic dysfunction likely will improve with dialysis  Consult Nephrology  I/O, daily weight, low-sodium diet      ESRD (end stage renal disease) on dialysis [facility])  Assessment & Plan  Lab Results   Component Value Date    EGFR 5 04/13/2021    EGFR 5 02/18/2021    EGFR 6 02/17/2021    CREATININE 12 51 (H) 04/13/2021    CREATININE 12 18 (H) 02/18/2021    CREATININE 10 99 (H) 02/17/2021   Usually has dialysis Tuesday Thursday Saturday  Patient denies any missed treatments  Consult Nephrology for management    SIRS due to non-infectious process without acute organ dysfunction [facility])  Assessment & Plan  · No source of infection likely in setting of needing dialysis     · Plan as mentioned    Anemia due to chronic kidney disease, on chronic dialysis [facility])  Assessment & Plan  Lab Results   Component Value Date    EGFR 5 04/13/2021    EGFR 5 02/18/2021    EGFR 6 02/17/2021    CREATININE 12 51 (H) 04/13/2021    CREATININE 12 18 (H) 02/18/2021    CREATININE 10 99 (H) 02/17/2021   Known history of anemia in setting of chronic kidney disease  Current hemoglobin 7 9  Transfuse if less than 7  Continue to monitor      Hyperparathyroidism ([address] )  Assessment & Plan  · Known history continue calcitriol    Essential hypertension  Assessment & Plan  · Blood pressure reasonable continue home regimen  · Routine vital monitoring    VTE Prophylaxis: Heparin  / sequential compression device   Code Status: full code   POLST: POLST is not applicable to this patient  Discussion with family: none at bedside     Anticipated Length of Stay:  Patient will be admitted on an Observation basis with an anticipated length of stay of  < 2 midnights  Justification for Hospital Stay: Acute on chronic CHF in setting of need for dialysis     Total Time for Visit, including Counseling / Coordination of Care: 40 minutes  Greater than 50% of this total time spent on direct patient counseling and coordination of care  Chief Complaint:   I have shortness of breath     History of Present Illness:    Rupinder Allison is a 39 y o  male who presents with known of history of ESRD on dialysis, CHF, HTN presented with sudden onset of SOB that woke patient from his sleep  Patient denies any missed dialysis treatment or illness  Patient further denies fever, chills, CP, abdominal pain, diarrhea, patient further denies making urine  Patient did not want to engage in further conversation at this time and stated his symptoms have improved  Review of Systems:    Review of Systems   Constitutional: Positive for activity change  Negative for appetite change, chills, diaphoresis, fatigue and fever  HENT: Negative  Respiratory: Positive for shortness of breath  Cardiovascular: Negative  Gastrointestinal: Negative  Genitourinary: Negative  Musculoskeletal: Negative  Neurological: Negative  Psychiatric/Behavioral: Negative  Past Medical and Surgical History:     Past Medical History:   Diagnosis Date    Chronic kidney disease     Hypertension     Pressure injury of skin     Renal disorder     dialysis        Past Surgical History:   Procedure Laterality Date    CARDIAC DEFIBRILLATOR PLACEMENT      IR AV FISTULAGRAM/GRAFTOGRAM  1/18/2019    IR TUNNELED CENTRAL LINE PLACEMENT  10/29/2020    OR EXPLORE PARATHYROID GLANDS N/A 10/22/2020    Procedure:  Three gland PARATHYROIDECTOMY, four gland exploration, intraoperative PTH monitoring;  Surgeon: Allegra Ferreira MD;  [facility];  Service: Surgical Oncology    SPLIT THICKNESS SKIN GRAFT Left 12/2/2020    Procedure: SKIN GRAFT SPLIT THICKNESS (STSG)  LEFT ARM;  Surgeon: Phyllis Amaya MD;  [facility];  Service: Plastics    WOUND DEBRIDEMENT Left 12/2/2020    Procedure: DEBRIDEMENT OF LEFT ARM WOUND;  Surgeon: Phyllis Amaya MD;  [facility];  Service: Plastics       Meds/Allergies:    Prior to Admission medications    Medication Sig Start Date End Date Taking? Authorizing Provider   calcitriol (ROCALTROL) 0 5 MCG capsule Take 2 capsules (1 mcg total) by mouth daily 10/30/20   Suhail Watson PA-C   Calcium Carbonate Antacid 1250 mg/5 mL Take 5 mL (1,250 mg total) by mouth 3 (three) times a day with meals 2/18/21 3/20/21  Rahul Dooley MD   carvedilol (COREG) 25 mg tablet Take 1 tablet (25 mg total) by mouth 2 (two) times a day with meals 7/6/20 11/25/20  Alee Tyler MD   diazepam (Valium) 5 mg tablet Take 1 tablet (5 mg total) by mouth every 8 (eight) hours as needed for muscle spasms 1/26/21   Mary Lou Pearce PA-C   hydrALAZINE (APRESOLINE) 25 mg tablet Take 3 tablets (75 mg total) by mouth every 8 (eight) hours 7/6/20 11/25/20  Alee Tyler MD   lidocaine (LIDODERM) 5 % Apply 1 patch topically daily Remove & Discard patch within 12 hours or as directed by MD 1/27/21   Madison Lennox Mikolosky, DO   NIFEdipine (PROCARDIA XL) 90 mg 24 hr tablet Take 90 mg by mouth daily 5/5/20 5/5/21  Historical Provider, MD     I have reviewed home medications with patient personally      Allergies: No Known Allergies    Social History:     Marital Status: /Civil Union   Occupation:    Patient Pre-hospital Living Situation:    Patient Pre-hospital Level of Mobility:    Patient Pre-hospital Diet Restrictions:     Substance Use History:   Social History     Substance and Sexual Activity   Alcohol Use Not Currently    Frequency: Never Social History     Tobacco Use   Smoking Status Former Smoker    Quit date: 6/15/2010    Years since quitting: 10 8   Smokeless Tobacco Never Used     Social History     Substance and Sexual Activity   Drug Use Never       Family History:    non-contributory    Physical Exam:     Vitals:   Blood Pressure: 163/87 (04/13/21 0530)  Pulse: 100 (04/13/21 0530)  Temperature: 97 9 °F (36 6 °C) (04/13/21 0405)  Temp Source: Oral (04/13/21 0405)  Respirations: 22 (04/13/21 0530)  SpO2: 100 % (04/13/21 0530)    Physical Exam  Constitutional:       Appearance: He is obese  HENT:      Head: Normocephalic and atraumatic  Nose: Nose normal       Mouth/Throat:      Mouth: Mucous membranes are moist    Eyes:      Pupils: Pupils are equal, round, and reactive to light  Cardiovascular:      Rate and Rhythm: Normal rate and regular rhythm  Pulmonary:      Effort: No tachypnea, accessory muscle usage or respiratory distress  Breath sounds: Decreased air movement present  Examination of the right-lower field reveals decreased breath sounds  Examination of the left-lower field reveals decreased breath sounds  Decreased breath sounds present  Abdominal:      General: Bowel sounds are normal    Musculoskeletal: Normal range of motion  Skin:     General: Skin is warm and dry  Neurological:      General: No focal deficit present  Mental Status: He is alert and oriented to person, place, and time  Psychiatric:         Mood and Affect: Mood normal          Behavior: Behavior normal              Additional Data:     Lab Results: I have personally reviewed pertinent reports        Results from last 7 days   Lab Units 04/13/21  0424   WBC Thousand/uL 7 66   HEMOGLOBIN g/dL 7 9*   HEMATOCRIT % 28 0*   PLATELETS Thousands/uL 316   NEUTROS PCT % 65   LYMPHS PCT % 21   MONOS PCT % 8   EOS PCT % 4     Results from last 7 days   Lab Units 04/13/21  0424   SODIUM mmol/L 140   POTASSIUM mmol/L 4 8   CHLORIDE mmol/L 100 CO2 mmol/L 24   BUN mg/dL 50*   CREATININE mg/dL 12 51*   ANION GAP mmol/L 16*   CALCIUM mg/dL 7 5*   ALBUMIN g/dL 3 1*   TOTAL BILIRUBIN mg/dL 0 41   ALK PHOS U/L 316*   ALT U/L 19   AST U/L 34   GLUCOSE RANDOM mg/dL 143*                       Imaging: I have personally reviewed pertinent reports  XR chest 1 view portable    (Results Pending)       EKG, Pathology, and Other Studies Reviewed on Admission:   · EKG:      Allscripts / Epic Records Reviewed: Yes     ** Please Note: This note has been constructed using a voice recognition system   **

## 2021-04-13 NOTE — ASSESSMENT & PLAN NOTE
Wt Readings from Last 3 Encounters:   04/09/21 97 kg (213 lb 13 5 oz)   02/14/21 107 kg (236 lb 15 9 oz)   01/26/21 99 8 kg (220 lb 0 3 oz)     Present on admission known history with last ejection fraction being 36% grade 1 diastolic dysfunction likely will improve with dialysis  Consult Nephrology  I/O, daily weight, low-sodium diet

## 2021-04-13 NOTE — ED PROVIDER NOTES
History  Chief Complaint   Patient presents with    Shortness of Breath     Pt reports waking up at 0230 with sob  Pt is supposed to have dialysis today  Patient is a 77-year-old male with history of chronic kidney disease, end-stage renal disease on dialysis, diabetes, congestive heart failure that presents to the emergency department with complaints of shortness of breath  Patient states he went to bed feeling well  States he woke up around 230 with shortness of breath  He states he got out of bed went to the restroom became increasingly more short of breath  Patient states he then presents emergency department when he was not feeling any better  Patient is on dialysis and is due for dialysis today  He denies any chest pain, nausea, vomiting, fever, chills  Prior to Admission Medications   Prescriptions Last Dose Informant Patient Reported? Taking?    Calcium Carbonate Antacid 1250 mg/5 mL   No No   Sig: Take 5 mL (1,250 mg total) by mouth 3 (three) times a day with meals   NIFEdipine (PROCARDIA XL) 90 mg 24 hr tablet  Self Yes No   Sig: Take 90 mg by mouth daily   calcitriol (ROCALTROL) 0 5 MCG capsule  Self No No   Sig: Take 2 capsules (1 mcg total) by mouth daily   carvedilol (COREG) 25 mg tablet  Self No No   Sig: Take 1 tablet (25 mg total) by mouth 2 (two) times a day with meals   diazepam (Valium) 5 mg tablet   No No   Sig: Take 1 tablet (5 mg total) by mouth every 8 (eight) hours as needed for muscle spasms   hydrALAZINE (APRESOLINE) 25 mg tablet  Self No No   Sig: Take 3 tablets (75 mg total) by mouth every 8 (eight) hours   lidocaine (LIDODERM) 5 %   No No   Sig: Apply 1 patch topically daily Remove & Discard patch within 12 hours or as directed by MD      Facility-Administered Medications: None       Past Medical History:   Diagnosis Date    Chronic kidney disease     Hypertension     Pressure injury of skin     Renal disorder     dialysis        Past Surgical History:   Procedure Laterality Date    CARDIAC DEFIBRILLATOR PLACEMENT      IR AV FISTULAGRAM/GRAFTOGRAM  1/18/2019    IR TUNNELED CENTRAL LINE PLACEMENT  10/29/2020    VT EXPLORE PARATHYROID GLANDS N/A 10/22/2020    Procedure: Three gland PARATHYROIDECTOMY, four gland exploration, intraoperative PTH monitoring;  Surgeon: Michaela Sánchez MD;  [facility];  Service: Surgical Oncology    SPLIT THICKNESS SKIN GRAFT Left 12/2/2020    Procedure: SKIN GRAFT SPLIT THICKNESS (STSG)  LEFT ARM;  Surgeon: John Noyola MD;  [facility];  Service: Plastics    WOUND DEBRIDEMENT Left 12/2/2020    Procedure: DEBRIDEMENT OF LEFT ARM WOUND;  Surgeon: John Noyola MD;  [facility];  Service: Plastics       History reviewed  No pertinent family history  I have reviewed and agree with the history as documented  E-Cigarette/Vaping    E-Cigarette Use Never User      E-Cigarette/Vaping Substances    Nicotine No     THC No     CBD No     Flavoring No     Other No     Unknown No      Social History     Tobacco Use    Smoking status: Former Smoker     Quit date: 6/15/2010     Years since quitting: 10 8    Smokeless tobacco: Never Used   Substance Use Topics    Alcohol use: Not Currently     Frequency: Never    Drug use: Never       Review of Systems   Constitutional: Negative for fever  Respiratory: Positive for shortness of breath  Cardiovascular: Negative for chest pain  Gastrointestinal: Negative for abdominal pain, nausea and vomiting  Neurological: Negative for dizziness, weakness and light-headedness  All other systems reviewed and are negative  Physical Exam  Physical Exam  Vitals signs reviewed  Constitutional:       Appearance: He is well-developed  HENT:      Head: Normocephalic and atraumatic  Eyes:      Extraocular Movements: Extraocular movements intact  Pupils: Pupils are equal, round, and reactive to light  Neck:      Musculoskeletal: Normal range of motion  Cardiovascular:      Rate and Rhythm: Normal rate and regular rhythm  Pulmonary:      Effort: Pulmonary effort is normal       Breath sounds: Decreased breath sounds present  Musculoskeletal:      Right lower leg: Edema present  Left lower leg: Edema present  Skin:     General: Skin is warm  Capillary Refill: Capillary refill takes less than 2 seconds  Neurological:      General: No focal deficit present  Mental Status: He is alert  Psychiatric:         Mood and Affect: Mood normal          Behavior: Behavior normal          Vital Signs  ED Triage Vitals   Temperature Pulse Respirations Blood Pressure SpO2   04/13/21 0405 04/13/21 0405 04/13/21 0405 04/13/21 0411 04/13/21 0405   97 9 °F (36 6 °C) (!) 122 (!) 24 148/95 90 %      Temp Source Heart Rate Source Patient Position - Orthostatic VS BP Location FiO2 (%)   04/13/21 0405 04/13/21 0405 04/13/21 0415 04/13/21 0415 --   Oral Monitor Lying Left arm       Pain Score       --                  Vitals:    04/13/21 0415 04/13/21 0445 04/13/21 0500 04/13/21 0530   BP: 148/95 146/92 158/87 163/87   Pulse: (!) 109 101 101 100   Patient Position - Orthostatic VS: Lying Lying Lying Lying         Visual Acuity  Visual Acuity      Most Recent Value   L Pupil Size (mm)  3   R Pupil Size (mm)  3   L Pupil Shape  Round   R Pupil Shape  Round          ED Medications  Medications - No data to display    Diagnostic Studies  Results Reviewed     Procedure Component Value Units Date/Time    COVID19, Influenza A/B, RSV PCR, [facility] [[id number]]  (Normal) Collected: 04/13/21 0503    Lab Status: Final result Specimen: Nares from Nasopharyngeal Swab Updated: 04/13/21 0546     SARS-CoV-2 Negative     INFLUENZA A PCR Negative     INFLUENZA B PCR Negative     RSV PCR Negative    Narrative: This test has been authorized by FDA under an EUA (Emergency Use Assay) for use by authorized laboratories    Clinical caution and judgement should be used with the interpretation of these results with consideration of the clinical impression and other laboratory testing  Testing reported as "Positive" or "Negative" has been proven to be accurate according to standard laboratory validation requirements  All testing is performed with control materials showing appropriate reactivity at standard intervals      NT-BNP PRO [[id number]]  (Abnormal) Collected: 04/13/21 0424    Lab Status: Final result Specimen: Blood from Arm, Left Updated: 04/13/21 0453     NT-proBNP 7,710 pg/mL     Comprehensive metabolic panel [[id number]]  (Abnormal) Collected: 04/13/21 0424    Lab Status: Final result Specimen: Blood from Arm, Left Updated: 04/13/21 0451     Sodium 140 mmol/L      Potassium 4 8 mmol/L      Chloride 100 mmol/L      CO2 24 mmol/L      ANION GAP 16 mmol/L      BUN 50 mg/dL      Creatinine 12 51 mg/dL      Glucose 143 mg/dL      Calcium 7 5 mg/dL      Corrected Calcium 8 2 mg/dL      AST 34 U/L      ALT 19 U/L      Alkaline Phosphatase 316 U/L      Total Protein 8 2 g/dL      Albumin 3 1 g/dL      Total Bilirubin 0 41 mg/dL      eGFR 5 ml/min/1 73sq m     Narrative:      National Kidney Disease Foundation guidelines for Chronic Kidney Disease (CKD):     Stage 1 with normal or high GFR (GFR > 90 mL/min/1 73 square meters)    Stage 2 Mild CKD (GFR = 60-89 mL/min/1 73 square meters)    Stage 3A Moderate CKD (GFR = 45-59 mL/min/1 73 square meters)    Stage 3B Moderate CKD (GFR = 30-44 mL/min/1 73 square meters)    Stage 4 Severe CKD (GFR = 15-29 mL/min/1 73 square meters)    Stage 5 End Stage CKD (GFR <15 mL/min/1 73 square meters)  Note: GFR calculation is accurate only with a steady state creatinine    Troponin I [[id number]]  (Normal) Collected: 04/13/21 0424    Lab Status: Final result Specimen: Blood from Arm, Left Updated: 04/13/21 0448     Troponin I <0 02 ng/mL     CBC and differential [[id number]]  (Abnormal) Collected: 04/13/21 0424    Lab Status: Final result Specimen: Blood from Arm, Left Updated: 04/13/21 0430     WBC 7 66 Thousand/uL      RBC 2 98 Million/uL      Hemoglobin 7 9 g/dL      Hematocrit 28 0 %      MCV 94 fL      MCH 26 5 pg      MCHC 28 2 g/dL      RDW 17 8 %      MPV 10 9 fL      Platelets 538 Thousands/uL      nRBC 0 /100 WBCs      Neutrophils Relative 65 %      Immat GRANS % 1 %      Lymphocytes Relative 21 %      Monocytes Relative 8 %      Eosinophils Relative 4 %      Basophils Relative 1 %      Neutrophils Absolute 5 05 Thousands/µL      Immature Grans Absolute 0 04 Thousand/uL      Lymphocytes Absolute 1 61 Thousands/µL      Monocytes Absolute 0 61 Thousand/µL      Eosinophils Absolute 0 29 Thousand/µL      Basophils Absolute 0 06 Thousands/µL                  XR chest 1 view portable    (Results Pending)              Procedures  ECG 12 Lead Documentation Only    Date/Time: 4/13/2021 6:07 AM  Performed by: Dixon Murphy PA-C  Authorized by: Dixon Murphy PA-C     Indications / Diagnosis:  Shortness of breath  ECG reviewed by me, the ED Provider: yes    Patient location:  ED  Previous ECG:     Previous ECG:  Compared to current    Similarity:  No change  Interpretation:     Interpretation: normal    Rate:     ECG rate:  112    ECG rate assessment: tachycardic    Rhythm:     Rhythm: sinus tachycardia               ED Course                             SBIRT 20yo+      Most Recent Value   SBIRT (23 yo +)   In order to provide better care to our patients, we are screening all of our patients for alcohol and drug use  Would it be okay to ask you these screening questions? Yes Filed at: 04/13/2021 0438   Initial Alcohol Screen: US AUDIT-C    1  How often do you have a drink containing alcohol?  0 Filed at: 04/13/2021 0438   2  How many drinks containing alcohol do you have on a typical day you are drinking? 0 Filed at: 04/13/2021 0438   3a  Male UNDER 65: How often do you have five or more drinks on one occasion? 0 Filed at: 04/13/2021 0438   3b  FEMALE Any Age, or MALE 65+: How often do you have 4 or more drinks on one occassion? 0 Filed at: 04/13/2021 0438   Audit-C Score  0 Filed at: 04/13/2021 4270   GABY: How many times in the past year have you    Used an illegal drug or used a prescription medication for non-medical reasons? Never Filed at: 04/13/2021 0438                    MDM  Number of Diagnoses or Management Options  CHF (congestive heart failure) ([facility] [address] ):   ESRD (end stage renal disease) on dialysis [facility]):   Diagnosis management comments: Patient is a 51-year-old male with history of chronic kidney disease, end-stage renal disease on dialysis, diabetes, congestive heart failure that presents to the emergency department with complaints of shortness of breath  Patient states he went to bed feeling well  States he woke up around 230 with shortness of breath  He states he got out of bed went to the restroom became increasingly more short of breath  Patient states he then presents emergency department when he was not feeling any better  Patient is on dialysis and is due for dialysis today  He denies any chest pain, nausea, vomiting, fever, chills  On examination, patient has diminished breath sounds  He was hypoxic upon arrival and was placed on 2 L of oxygen  O2 sat improved significantly and he became more comfortable  Chest x-ray was reviewed and does show evidence of patchy infiltrates  Patient was diagnosed with COVID approximately 6 weeks ago  Lab evaluation was reviewed and does show evidence of a known chronic kidney disease  Patient will be admitted to the hospital for observation and dialysis  Patient is requiring oxygen at this time         Amount and/or Complexity of Data Reviewed  Clinical lab tests: ordered and reviewed  Tests in the radiology section of CPT®: ordered and reviewed  Review and summarize past medical records: yes  Discuss the patient with other providers: yes  Independent visualization of images, tracings, or specimens: yes    Risk of Complications, Morbidity, and/or Mortality  Presenting problems: moderate  Diagnostic procedures: moderate  Management options: moderate    Patient Progress  Patient progress: stable      Disposition  Final diagnoses:   CHF (congestive heart failure) ([place] [id number] )   ESRD (end stage renal disease) on dialysis [facility])     Time reflects when diagnosis was documented in both MDM as applicable and the Disposition within this note     Time User Action Codes Description Comment    4/13/2021  5:50 AM Dala Dragon Add [I50 9] CHF (congestive heart failure) ([place] [address] )     4/13/2021  5:50 AM Dala Dragon Add [N18 6,  Z99 2] ESRD (end stage renal disease) on dialysis [facility])       ED Disposition     ED Disposition Condition Date/Time Comment    Admit Stable Tue Apr 13, 2021 0550 Case was discussed with MARLENE and the patient's admission status was agreed to be Admission Status: observation status to the service of Dr Claudeen Danes  Follow-up Information    None         Patient's Medications   Discharge Prescriptions    No medications on file     No discharge procedures on file      PDMP Review       Value Time User    PDMP Reviewed  Yes 2/16/2021  8:34 AM Eusebio Bragg MD          ED Provider  Electronically Signed by           Norman Felipe PA-C  04/13/21 9715

## 2021-04-13 NOTE — CONSULTS
NEPHROLOGY CONSULTATION NOTE    Patient: Emmalene Krabbe               Sex: male          DOA: 4/13/2021  4:01 AM   YOB: 1975        Age:  39 y o         LOS:  LOS: 0 days     REFERRING PHYSICIAN: Dr Damian Caraballo / Anila Villaltausing:  ESRD on HD    [address] / SERVICE: 4/13/2021    ADMISSION DIAGNOSIS: Acute on chronic diastolic congestive heart failure (HCC)     CHIEF COMPLAINT     Shortness of breath    HPI     42-year-old male with past medical history of ESRD on hemodialysis, recent episode epidural abscess, hypertension, insulin-dependent diabetes mellitus, secondary hyperparathyroidism of renal origin status post parathyroidectomy who presents to the ED with complaints of sudden onset shortness of breath that woke him up from sleep  Patient undergoes hemodialysis at the [facility] on Tuesdays, Thursdays and Saturday  Denies missing any dialysis session  Denies any chest pain at present time  PAST MEDICAL HISTORY     Past Medical History:   Diagnosis Date    Chronic kidney disease     Hypertension     Pressure injury of skin     Renal disorder     dialysis        PAST SURGICAL HISTORY     Past Surgical History:   Procedure Laterality Date    CARDIAC DEFIBRILLATOR PLACEMENT      IR AV FISTULAGRAM/GRAFTOGRAM  1/18/2019    IR TUNNELED CENTRAL LINE PLACEMENT  10/29/2020    [place] EXPLORE PARATHYROID GLANDS N/A 10/22/2020    Procedure:  Three gland PARATHYROIDECTOMY, four gland exploration, intraoperative PTH monitoring;  Surgeon: Aba Miramontes MD;  [facility];  Service: Surgical Oncology    SPLIT THICKNESS SKIN GRAFT Left 12/2/2020    Procedure: SKIN GRAFT SPLIT THICKNESS (STSG)  LEFT ARM;  Surgeon: Tasha Alonzo MD;  [facility];  Service: Plastics    WOUND DEBRIDEMENT Left 12/2/2020    Procedure: DEBRIDEMENT OF LEFT ARM WOUND;  Surgeon: Tasha Alonzo MD;  [facility];  Service: Plastics       ALLERGIES     No Known Allergies    SOCIAL HISTORY     Social History     Substance and Sexual Activity   Alcohol Use Not Currently    Frequency: Never     Social History     Substance and Sexual Activity   Drug Use Never     Social History     Tobacco Use   Smoking Status Former Smoker    Quit date: 6/15/2010    Years since quitting: 10 8   Smokeless Tobacco Never Used       FAMILY HISTORY     History reviewed  No pertinent family history      CURRENT MEDICATIONS       Current Facility-Administered Medications:     acetaminophen (TYLENOL) tablet 650 mg, 650 mg, Oral, Q6H PRN, CLARK Martinez    calcitriol (ROCALTROL) capsule 1 mcg, 1 mcg, Oral, Daily, CLARK Martinez    diazepam (VALIUM) tablet 5 mg, 5 mg, Oral, Q8H PRN, CLARK Martinez    heparin (porcine) subcutaneous injection 5,000 Units, 5,000 Units, Subcutaneous, Q8H [address] **AND** [CANCELED] Platelet count, , , Once, CLARK Martinez    NIFEdipine ER (ADALAT CC) 24 hr tablet 90 mg, 90 mg, Oral, Daily, CLARK Martinez    Current Outpatient Medications:     calcitriol (ROCALTROL) 0 5 MCG capsule, Take 2 capsules (1 mcg total) by mouth daily, Disp: 60 capsule, Rfl: 0    Calcium Carbonate Antacid 1250 mg/5 mL, Take 5 mL (1,250 mg total) by mouth 3 (three) times a day with meals, Disp: 450 mL, Rfl: 0    carvedilol (COREG) 25 mg tablet, Take 1 tablet (25 mg total) by mouth 2 (two) times a day with meals, Disp: 60 tablet, Rfl: 0    diazepam (Valium) 5 mg tablet, Take 1 tablet (5 mg total) by mouth every 8 (eight) hours as needed for muscle spasms, Disp: 12 tablet, Rfl: 0    hydrALAZINE (APRESOLINE) 25 mg tablet, Take 3 tablets (75 mg total) by mouth every 8 (eight) hours, Disp: 270 tablet, Rfl: 0    lidocaine (LIDODERM) 5 %, Apply 1 patch topically daily Remove & Discard patch within 12 hours or as directed by MD, Disp: 30 patch, Rfl: 0    NIFEdipine (PROCARDIA XL) 90 mg 24 hr tablet, Take 90 mg by mouth daily, Disp: , Rfl:     REVIEW OF SYSTEMS Review of Systems   Constitutional: Negative  HENT: Negative  Eyes: Negative  Respiratory: Positive for shortness of breath  Cardiovascular: Negative  Gastrointestinal: Negative  Endocrine: Negative  Genitourinary: Negative  Musculoskeletal: Negative  Skin: Negative  Allergic/Immunologic: Negative  Neurological: Negative  Hematological: Negative  All other systems reviewed and are negative  OBJECTIVE     Current Weight: Weight - Scale: 97 kg (213 lb 13 5 oz)  Vitals:    04/13/21 1000   BP: (!) 176/96   Pulse: 93   Resp: 19   Temp:    SpO2:      Body mass index is 30 68 kg/m²  No intake or output data in the 24 hours ending 04/13/21 1029    PHYSICAL EXAMINATION     Physical Exam  HENT:      Head: Normocephalic and atraumatic  Eyes:      Pupils: Pupils are equal, round, and reactive to light  Neck:      Musculoskeletal: Neck supple  Vascular: No JVD  Cardiovascular:      Rate and Rhythm: Normal rate and regular rhythm  Heart sounds: Normal heart sounds  No murmur  No friction rub  Pulmonary:      Effort: Pulmonary effort is normal       Breath sounds: Normal breath sounds  Abdominal:      General: Bowel sounds are normal  There is no distension  Palpations: Abdomen is soft  Tenderness: There is no abdominal tenderness  There is no rebound  Musculoskeletal:         General: No tenderness  Skin:     General: Skin is dry  Findings: No rash  Neurological:      Mental Status: He is alert and oriented to person, place, and time             LAB RESULTS        Results from last 7 days   Lab Units 04/13/21  0424   WBC Thousand/uL 7 66   HEMOGLOBIN g/dL 7 9*   HEMATOCRIT % 28 0*   PLATELETS Thousands/uL 316   POTASSIUM mmol/L 4 8   CHLORIDE mmol/L 100   CO2 mmol/L 24   BUN mg/dL 50*   CREATININE mg/dL 12 51*   EGFR ml/min/1 73sq m 5   CALCIUM mg/dL 7 5*           RADIOLOGY RESULTS     Results for orders placed during the hospital encounter of 02/14/21   XR chest 1 view portable    Narrative CHEST     INDICATION:   weakness  COMPARISON:  1/27/2021    EXAM PERFORMED/VIEWS:  XR CHEST PORTABLE      FINDINGS:  ICD transvenous pacemaker present as on previous examination  The cardiac silhouette is without change from the prior study  The lungs are clear  No pneumothorax or pleural effusion  Osseous structures appear within normal limits for patient age  Impression No acute pulmonary disease            Workstation performed: [id number]             PLAN / RECOMMENDATIONS      71-year-old male with past medical history of ESRD on hemodialysis, hypertension, insulin-dependent diabetes mellitus, secondary hyperparathyroidism of renal origin status post parathyroidectomy, epidural abscess status post completion of antibiotics who presents for facility with acute onset of shortness of breath  1  ESRD on hemodialysis:  Undergoes hemodialysis on Tuesdays, Thursdays and Saturday   -last session of dialysis was on Saturday   -patient was seen and examined during hemodialysis at 9:58 a m  Karrie Nissen -planned UF of 3 5 L targeted  2  Volume overload:  Imaging revealed pulmonary vascular congestion   -ultrafiltration target of 3 5 L ordered  3  Anemia due to ESRD:  Hemoglobin of 7 9 and low  Will order Epogen with dialysis  4  Hypertension due to ESRD:  Blood pressure elevated 122 mmHg systolic  Patient did not get his home antihypertensive yet  5  Hypocalcemia:  Secondary to recent parathyroidectomy  Remains on calcitriol   -recommend resuming calcium carbonate  Thank you for the consultation to participate in patient's care  I have personally discussed my plan with the referring physician       Terri Chiang MD    4/13/2021

## 2021-04-15 NOTE — QUICK NOTE
Patient came with shortness of breath  Likely volume overload  Evaluated by nephrologist   Had hemodialysis done earlier today  After dialysis he feels fine  Saturating well on room air  Serial troponin negative  ACS ruled out  Patient does not want to stay in hospital overnight  He wants to go home  Recommended to come to emergency room if any worsening of symptoms, if any chest pain or shortness of breath

## 2021-05-13 ENCOUNTER — TRANSCRIBE ORDERS (OUTPATIENT)
Dept: ADMINISTRATIVE | Facility: HOSPITAL | Age: 46
End: 2021-05-13

## 2021-05-13 DIAGNOSIS — M46.42 DISCITIS, UNSPECIFIED, CERVICAL REGION: Primary | ICD-10-CM

## 2021-05-21 ENCOUNTER — TELEPHONE (OUTPATIENT)
Dept: NEUROSURGERY | Facility: CLINIC | Age: 46
End: 2021-05-21

## 2021-06-21 ENCOUNTER — TELEPHONE (OUTPATIENT)
Dept: NEPHROLOGY | Facility: CLINIC | Age: 46
End: 2021-06-21

## 2021-06-21 NOTE — TELEPHONE ENCOUNTER
Fawn Watson from Dr Jorge Ledezma (infectious disease) office called  She stated that Dr Juana Tompkins would like to talk to Dr Veronica Stout regarding the results of a recent cat scan  She can be reached at [phone]

## 2021-06-21 NOTE — TELEPHONE ENCOUNTER
Discussed with   About CT scan with contrast   Advised him to coordinate with dialysis unit about scheduling CT scan with contrast so he can get dialysis afterwards

## 2021-07-26 ENCOUNTER — PREP FOR PROCEDURE (OUTPATIENT)
Dept: INTERVENTIONAL RADIOLOGY/VASCULAR | Facility: CLINIC | Age: 46
End: 2021-07-26

## 2021-07-26 DIAGNOSIS — Z99.2 END-STAGE RENAL DISEASE ON HEMODIALYSIS (HCC): Primary | ICD-10-CM

## 2021-07-26 DIAGNOSIS — N18.6 END-STAGE RENAL DISEASE ON HEMODIALYSIS (HCC): Primary | ICD-10-CM

## 2021-08-11 ENCOUNTER — TELEPHONE (OUTPATIENT)
Dept: SURGERY | Facility: HOSPITAL | Age: 46
End: 2021-08-11

## 2021-09-14 ENCOUNTER — TELEPHONE (OUTPATIENT)
Dept: NEPHROLOGY | Facility: CLINIC | Age: 46
End: 2021-09-14

## 2021-09-14 NOTE — TELEPHONE ENCOUNTER
I called and left a message on machine for patient to return our call about scheduling a CT Spine Cervical W/WO Contrast per Dr Kaylah Ledesma,

## 2021-09-16 ENCOUNTER — PREP FOR PROCEDURE (OUTPATIENT)
Dept: INTERVENTIONAL RADIOLOGY/VASCULAR | Facility: CLINIC | Age: 46
End: 2021-09-16

## 2021-09-16 DIAGNOSIS — N18.6 ESRD (END STAGE RENAL DISEASE) (HCC): Primary | ICD-10-CM

## 2021-10-22 ENCOUNTER — HOSPITAL ENCOUNTER (OUTPATIENT)
Dept: NON INVASIVE DIAGNOSTICS | Facility: HOSPITAL | Age: 46
Discharge: HOME | End: 2021-10-22
Attending: RADIOLOGY | Admitting: RADIOLOGY
Payer: COMMERCIAL

## 2021-10-22 VITALS
WEIGHT: 244.93 LBS | SYSTOLIC BLOOD PRESSURE: 144 MMHG | HEIGHT: 70 IN | DIASTOLIC BLOOD PRESSURE: 66 MMHG | TEMPERATURE: 97.5 F | HEART RATE: 81 BPM | RESPIRATION RATE: 16 BRPM | BODY MASS INDEX: 35.07 KG/M2 | OXYGEN SATURATION: 100 %

## 2021-10-22 DIAGNOSIS — N18.6 ESRD (END STAGE RENAL DISEASE) (HCC): ICD-10-CM

## 2021-10-22 LAB
ALBUMIN SERPL BCP-MCNC: 4 G/DL (ref 3.5–5)
ALP SERPL-CCNC: 199 U/L (ref 46–116)
ALT SERPL W P-5'-P-CCNC: 17 U/L (ref 12–78)
ANION GAP SERPL CALCULATED.3IONS-SCNC: 17 MMOL/L (ref 4–13)
AST SERPL W P-5'-P-CCNC: 17 U/L (ref 5–45)
BASOPHILS # BLD AUTO: 0.04 THOUSANDS/ΜL (ref 0–0.1)
BASOPHILS NFR BLD AUTO: 1 % (ref 0–1)
BILIRUB SERPL-MCNC: 0.41 MG/DL (ref 0.2–1)
BUN SERPL-MCNC: 57 MG/DL (ref 5–25)
CALCIUM SERPL-MCNC: 7.7 MG/DL (ref 8.3–10.1)
CHLORIDE SERPL-SCNC: 98 MMOL/L (ref 100–108)
CO2 SERPL-SCNC: 27 MMOL/L (ref 21–32)
CREAT SERPL-MCNC: 12.56 MG/DL (ref 0.6–1.3)
EOSINOPHIL # BLD AUTO: 0.3 THOUSAND/ΜL (ref 0–0.61)
EOSINOPHIL NFR BLD AUTO: 5 % (ref 0–6)
ERYTHROCYTE [DISTWIDTH] IN BLOOD BY AUTOMATED COUNT: 16.5 % (ref 11.6–15.1)
GFR SERPL CREATININE-BSD FRML MDRD: 5 ML/MIN/1.73SQ M
GLUCOSE P FAST SERPL-MCNC: 114 MG/DL (ref 65–99)
GLUCOSE SERPL-MCNC: 114 MG/DL (ref 65–140)
HCT VFR BLD AUTO: 37.3 % (ref 36.5–49.3)
HGB BLD-MCNC: 11.3 G/DL (ref 12–17)
IMM GRANULOCYTES # BLD AUTO: 0.03 THOUSAND/UL (ref 0–0.2)
IMM GRANULOCYTES NFR BLD AUTO: 0 % (ref 0–2)
LYMPHOCYTES # BLD AUTO: 1.84 THOUSANDS/ΜL (ref 0.6–4.47)
LYMPHOCYTES NFR BLD AUTO: 28 % (ref 14–44)
MCH RBC QN AUTO: 27 PG (ref 26.8–34.3)
MCHC RBC AUTO-ENTMCNC: 30.3 G/DL (ref 31.4–37.4)
MCV RBC AUTO: 89 FL (ref 82–98)
MONOCYTES # BLD AUTO: 0.52 THOUSAND/ΜL (ref 0.17–1.22)
MONOCYTES NFR BLD AUTO: 8 % (ref 4–12)
NEUTROPHILS # BLD AUTO: 3.95 THOUSANDS/ΜL (ref 1.85–7.62)
NEUTS SEG NFR BLD AUTO: 58 % (ref 43–75)
NRBC BLD AUTO-RTO: 0 /100 WBCS
PLATELET # BLD AUTO: 179 THOUSANDS/UL (ref 149–390)
PMV BLD AUTO: 9.9 FL (ref 8.9–12.7)
POTASSIUM SERPL-SCNC: 4.9 MMOL/L (ref 3.5–5.3)
PROT SERPL-MCNC: 8.7 G/DL (ref 6.4–8.2)
RBC # BLD AUTO: 4.18 MILLION/UL (ref 3.88–5.62)
SODIUM SERPL-SCNC: 142 MMOL/L (ref 136–145)
WBC # BLD AUTO: 6.68 THOUSAND/UL (ref 4.31–10.16)

## 2021-10-22 PROCEDURE — C1769 GUIDE WIRE: HCPCS

## 2021-10-22 PROCEDURE — 36901 INTRO CATH DIALYSIS CIRCUIT: CPT

## 2021-10-22 PROCEDURE — 80053 COMPREHEN METABOLIC PANEL: CPT | Performed by: RADIOLOGY

## 2021-10-22 PROCEDURE — C1894 INTRO/SHEATH, NON-LASER: HCPCS

## 2021-10-22 PROCEDURE — 36901 INTRO CATH DIALYSIS CIRCUIT: CPT | Performed by: RADIOLOGY

## 2021-10-22 PROCEDURE — 76937 US GUIDE VASCULAR ACCESS: CPT

## 2021-10-22 PROCEDURE — 85025 COMPLETE CBC W/AUTO DIFF WBC: CPT | Performed by: RADIOLOGY

## 2021-10-22 RX ORDER — LIDOCAINE WITH 8.4% SOD BICARB 0.9%(10ML)
SYRINGE (ML) INJECTION CODE/TRAUMA/SEDATION MEDICATION
Status: COMPLETED | OUTPATIENT
Start: 2021-10-22 | End: 2021-10-22

## 2021-10-22 RX ADMIN — Medication 3 ML: at 08:58

## 2021-10-22 RX ADMIN — IOHEXOL 50 ML: 350 INJECTION, SOLUTION INTRAVENOUS at 09:57

## 2021-10-25 ENCOUNTER — OFFICE VISIT (OUTPATIENT)
Dept: VASCULAR SURGERY | Facility: CLINIC | Age: 46
End: 2021-10-25
Payer: COMMERCIAL

## 2021-10-25 VITALS
HEART RATE: 82 BPM | BODY MASS INDEX: 34.36 KG/M2 | SYSTOLIC BLOOD PRESSURE: 138 MMHG | TEMPERATURE: 98.1 F | DIASTOLIC BLOOD PRESSURE: 88 MMHG | WEIGHT: 240 LBS | RESPIRATION RATE: 18 BRPM | HEIGHT: 70 IN

## 2021-10-25 DIAGNOSIS — Z99.2 END-STAGE RENAL DISEASE ON HEMODIALYSIS (HCC): Primary | ICD-10-CM

## 2021-10-25 DIAGNOSIS — N18.6 END-STAGE RENAL DISEASE ON HEMODIALYSIS (HCC): Primary | ICD-10-CM

## 2021-10-25 PROCEDURE — 99244 OFF/OP CNSLTJ NEW/EST MOD 40: CPT | Performed by: SURGERY

## 2021-10-25 RX ORDER — SILDENAFIL 100 MG/1
TABLET, FILM COATED ORAL
COMMUNITY
Start: 2021-10-01

## 2021-10-25 RX ORDER — SEVELAMER HYDROCHLORIDE 800 MG/1
TABLET, FILM COATED ORAL
COMMUNITY
Start: 2021-08-13

## 2021-10-25 RX ORDER — CHOLECALCIFEROL (VITAMIN D3) 1250 MCG
1 CAPSULE ORAL WEEKLY
COMMUNITY
Start: 2021-08-02

## 2021-11-08 ENCOUNTER — HOSPITAL ENCOUNTER (OUTPATIENT)
Dept: VASCULAR ULTRASOUND | Facility: HOSPITAL | Age: 46
Discharge: HOME | End: 2021-11-08
Attending: SURGERY
Payer: COMMERCIAL

## 2021-11-08 DIAGNOSIS — Z99.2 END-STAGE RENAL DISEASE ON HEMODIALYSIS (HCC): ICD-10-CM

## 2021-11-08 DIAGNOSIS — I77.89 ARTERIAL STEAL SYNDROME (HCC): ICD-10-CM

## 2021-11-08 DIAGNOSIS — N18.6 END-STAGE RENAL DISEASE ON HEMODIALYSIS (HCC): ICD-10-CM

## 2021-11-08 PROCEDURE — 93990 DOPPLER FLOW TESTING: CPT

## 2021-11-09 PROCEDURE — 93931 UPPER EXTREMITY STUDY: CPT | Performed by: SURGERY

## 2021-11-10 ENCOUNTER — TELEPHONE (OUTPATIENT)
Dept: VASCULAR SURGERY | Facility: CLINIC | Age: 46
End: 2021-11-10

## 2021-11-24 DIAGNOSIS — G62.9 NEUROPATHY: ICD-10-CM

## 2021-11-26 RX ORDER — GABAPENTIN 100 MG/1
CAPSULE ORAL
Qty: 30 CAPSULE | Refills: 0 | Status: SHIPPED | OUTPATIENT
Start: 2021-11-26

## 2021-11-30 ENCOUNTER — TELEPHONE (OUTPATIENT)
Dept: NEPHROLOGY | Facility: CLINIC | Age: 46
End: 2021-11-30

## 2021-12-01 ENCOUNTER — HOSPITAL ENCOUNTER (OUTPATIENT)
Dept: CT IMAGING | Facility: HOSPITAL | Age: 46
Discharge: HOME | End: 2021-12-01
Attending: INTERNAL MEDICINE
Payer: COMMERCIAL

## 2021-12-01 DIAGNOSIS — M86.8X9 OTHER OSTEOMYELITIS, UNSPECIFIED SITE (HCC): ICD-10-CM

## 2021-12-01 PROCEDURE — 72126 CT NECK SPINE W/DYE: CPT

## 2021-12-01 PROCEDURE — G1004 CDSM NDSC: HCPCS

## 2021-12-01 RX ADMIN — IOHEXOL 85 ML: 350 INJECTION, SOLUTION INTRAVENOUS at 12:28

## 2021-12-28 ENCOUNTER — LAB REQUISITION (OUTPATIENT)
Dept: LAB | Facility: HOSPITAL | Age: 46
End: 2021-12-28
Payer: COMMERCIAL

## 2021-12-28 DIAGNOSIS — Z11.52 ENCOUNTER FOR SCREENING FOR COVID-19: ICD-10-CM

## 2021-12-28 LAB — SARS-COV-2 RNA RESP QL NAA+PROBE: NEGATIVE

## 2021-12-28 PROCEDURE — 87635 SARS-COV-2 COVID-19 AMP PRB: CPT | Performed by: INTERNAL MEDICINE

## 2022-01-01 ENCOUNTER — HOSPITAL ENCOUNTER (EMERGENCY)
Facility: HOSPITAL | Age: 47
Discharge: HOME | End: 2022-01-01
Attending: EMERGENCY MEDICINE
Payer: COMMERCIAL

## 2022-01-01 ENCOUNTER — APPOINTMENT (EMERGENCY)
Dept: RADIOLOGY | Facility: HOSPITAL | Age: 47
End: 2022-01-01
Payer: COMMERCIAL

## 2022-01-01 ENCOUNTER — APPOINTMENT (EMERGENCY)
Dept: DIALYSIS | Facility: HOSPITAL | Age: 47
End: 2022-01-01
Payer: COMMERCIAL

## 2022-01-01 VITALS
HEART RATE: 80 BPM | RESPIRATION RATE: 16 BRPM | DIASTOLIC BLOOD PRESSURE: 114 MMHG | SYSTOLIC BLOOD PRESSURE: 181 MMHG | TEMPERATURE: 97.4 F | OXYGEN SATURATION: 94 %

## 2022-01-01 DIAGNOSIS — E87.70 VOLUME OVERLOAD: Primary | ICD-10-CM

## 2022-01-01 DIAGNOSIS — R06.00 DYSPNEA: ICD-10-CM

## 2022-01-01 LAB
ANION GAP SERPL CALCULATED.3IONS-SCNC: 14 MMOL/L (ref 4–13)
ATRIAL RATE: 87 BPM
BASOPHILS # BLD AUTO: 0.05 THOUSANDS/ΜL (ref 0–0.1)
BASOPHILS NFR BLD AUTO: 1 % (ref 0–1)
BUN SERPL-MCNC: 73 MG/DL (ref 5–25)
CALCIUM SERPL-MCNC: 7.4 MG/DL (ref 8.3–10.1)
CARDIAC TROPONIN I PNL SERPL HS: 11 NG/L
CHLORIDE SERPL-SCNC: 98 MMOL/L (ref 100–108)
CO2 SERPL-SCNC: 27 MMOL/L (ref 21–32)
CREAT SERPL-MCNC: 12.67 MG/DL (ref 0.6–1.3)
EOSINOPHIL # BLD AUTO: 0.28 THOUSAND/ΜL (ref 0–0.61)
EOSINOPHIL NFR BLD AUTO: 4 % (ref 0–6)
ERYTHROCYTE [DISTWIDTH] IN BLOOD BY AUTOMATED COUNT: 15.1 % (ref 11.6–15.1)
FLUAV RNA RESP QL NAA+PROBE: NEGATIVE
FLUBV RNA RESP QL NAA+PROBE: NEGATIVE
GFR SERPL CREATININE-BSD FRML MDRD: 4 ML/MIN/1.73SQ M
GLUCOSE SERPL-MCNC: 93 MG/DL (ref 65–140)
HCT VFR BLD AUTO: 35.4 % (ref 36.5–49.3)
HGB BLD-MCNC: 11 G/DL (ref 12–17)
IMM GRANULOCYTES # BLD AUTO: 0.02 THOUSAND/UL (ref 0–0.2)
IMM GRANULOCYTES NFR BLD AUTO: 0 % (ref 0–2)
INR PPP: 1.15 (ref 0.84–1.19)
LYMPHOCYTES # BLD AUTO: 1.78 THOUSANDS/ΜL (ref 0.6–4.47)
LYMPHOCYTES NFR BLD AUTO: 27 % (ref 14–44)
MCH RBC QN AUTO: 27.7 PG (ref 26.8–34.3)
MCHC RBC AUTO-ENTMCNC: 31.1 G/DL (ref 31.4–37.4)
MCV RBC AUTO: 89 FL (ref 82–98)
MONOCYTES # BLD AUTO: 0.4 THOUSAND/ΜL (ref 0.17–1.22)
MONOCYTES NFR BLD AUTO: 6 % (ref 4–12)
NEUTROPHILS # BLD AUTO: 4.03 THOUSANDS/ΜL (ref 1.85–7.62)
NEUTS SEG NFR BLD AUTO: 62 % (ref 43–75)
NRBC BLD AUTO-RTO: 0 /100 WBCS
P AXIS: 63 DEGREES
PLATELET # BLD AUTO: 201 THOUSANDS/UL (ref 149–390)
PMV BLD AUTO: 10.1 FL (ref 8.9–12.7)
POTASSIUM SERPL-SCNC: 5.8 MMOL/L (ref 3.5–5.3)
PR INTERVAL: 174 MS
PROTHROMBIN TIME: 14.3 SECONDS (ref 11.6–14.5)
QRS AXIS: 10 DEGREES
QRSD INTERVAL: 86 MS
QT INTERVAL: 424 MS
QTC INTERVAL: 510 MS
RBC # BLD AUTO: 3.97 MILLION/UL (ref 3.88–5.62)
RSV RNA RESP QL NAA+PROBE: NEGATIVE
SARS-COV-2 RNA RESP QL NAA+PROBE: NEGATIVE
SODIUM SERPL-SCNC: 139 MMOL/L (ref 136–145)
T WAVE AXIS: 60 DEGREES
VENTRICULAR RATE: 87 BPM
WBC # BLD AUTO: 6.56 THOUSAND/UL (ref 4.31–10.16)

## 2022-01-01 PROCEDURE — 71045 X-RAY EXAM CHEST 1 VIEW: CPT

## 2022-01-01 PROCEDURE — 80048 BASIC METABOLIC PNL TOTAL CA: CPT | Performed by: EMERGENCY MEDICINE

## 2022-01-01 PROCEDURE — 85025 COMPLETE CBC W/AUTO DIFF WBC: CPT | Performed by: EMERGENCY MEDICINE

## 2022-01-01 PROCEDURE — 0241U HB NFCT DS VIR RESP RNA 4 TRGT: CPT | Performed by: EMERGENCY MEDICINE

## 2022-01-01 PROCEDURE — 93005 ELECTROCARDIOGRAM TRACING: CPT

## 2022-01-01 PROCEDURE — 99285 EMERGENCY DEPT VISIT HI MDM: CPT

## 2022-01-01 PROCEDURE — 99285 EMERGENCY DEPT VISIT HI MDM: CPT | Performed by: INTERNAL MEDICINE

## 2022-01-01 PROCEDURE — 36415 COLL VENOUS BLD VENIPUNCTURE: CPT | Performed by: EMERGENCY MEDICINE

## 2022-01-01 PROCEDURE — 93010 ELECTROCARDIOGRAM REPORT: CPT | Performed by: INTERNAL MEDICINE

## 2022-01-01 PROCEDURE — 99285 EMERGENCY DEPT VISIT HI MDM: CPT | Performed by: EMERGENCY MEDICINE

## 2022-01-01 PROCEDURE — 84484 ASSAY OF TROPONIN QUANT: CPT | Performed by: EMERGENCY MEDICINE

## 2022-01-01 PROCEDURE — 90935 HEMODIALYSIS ONE EVALUATION: CPT

## 2022-01-01 PROCEDURE — 85610 PROTHROMBIN TIME: CPT | Performed by: EMERGENCY MEDICINE

## 2022-01-01 NOTE — CONSULTS
NEPHROLOGY CONSULTATION NOTE    Patient: Kerwin Davis               Sex: male          DOA: 1/1/2022 11:06 AM   YOB: 1975        Age:  55 y o         LOS:  LOS: 0 days     REFERRING PHYSICIAN: Dr Neftaly Morales / CONSULTATION:  Volume overload/ESRD    DATE OF CONSULTATION / SERVICE: 1/1/2022    ADMISSION DIAGNOSIS: <principal problem not specified>     CHIEF COMPLAINT     Shortness of breath    HPI     43-year-old male with past medical history of ESRD on hemodialysis, hypertension, epidural abscess, insulin-dependent diabetes mellitus, secondary hyperparathyroidism of renal origin status post parathyroidectomy who presents to ED with complaints of progressive shortness of breath that initiated on last Wednesday  Patient did undergo hemodialysis on Thursday without much improvement  Patient is here with worsening shortness of breath and chest x-ray revealing volume overload  Labs obtained revealed serum potassium of 5 8 and elevated as well  Patient denies any fever, chills, nausea, vomiting or diarrhea  He was tested negative for COVID-19  PAST MEDICAL HISTORY     Past Medical History:   Diagnosis Date    Chronic kidney disease     Hypertension     Pressure injury of skin     Renal disorder     dialysis        PAST SURGICAL HISTORY     Past Surgical History:   Procedure Laterality Date    CARDIAC DEFIBRILLATOR PLACEMENT      IR AV FISTULAGRAM/GRAFTOGRAM  1/18/2019    IR AV FISTULAGRAM/GRAFTOGRAM  10/22/2021    IR TUNNELED CENTRAL LINE PLACEMENT  10/29/2020    [place] EXPLORE PARATHYROID GLANDS N/A 10/22/2020    Procedure:  Three gland PARATHYROIDECTOMY, four gland exploration, intraoperative PTH monitoring;  Surgeon: Vibha Trinidad MD;  [facility];  Service: Surgical Oncology    SPLIT THICKNESS SKIN GRAFT Left 12/2/2020    Procedure: SKIN GRAFT SPLIT THICKNESS (STSG)  LEFT ARM;  Surgeon: Buzz Ramos MD;  [facility];  Service: Plastics  WOUND DEBRIDEMENT Left 2020    Procedure: DEBRIDEMENT OF LEFT ARM WOUND;  Surgeon: Hemal Swan MD;  [facility];  Service: Plastics       ALLERGIES     No Known Allergies    SOCIAL HISTORY     Social History     Substance and Sexual Activity   Alcohol Use Not Currently     Social History     Substance and Sexual Activity   Drug Use Never     Social History     Tobacco Use   Smoking Status Former Smoker    Quit date: 6/15/2010    Years since quittin 5   Smokeless Tobacco Never Used       FAMILY HISTORY     History reviewed  No pertinent family history  CURRENT MEDICATIONS     No current facility-administered medications for this encounter      Current Outpatient Medications:     calcitriol (ROCALTROL) 0 5 MCG capsule, Take 2 capsules (1 mcg total) by mouth daily (Patient not taking: Reported on 10/25/2021), Disp: 60 capsule, Rfl: 0    Calcium Carbonate Antacid 1250 mg/5 mL, Take 5 mL (1,250 mg total) by mouth 3 (three) times a day with meals, Disp: 450 mL, Rfl: 0    carvedilol (COREG) 25 mg tablet, Take 1 tablet (25 mg total) by mouth 2 (two) times a day with meals, Disp: 60 tablet, Rfl: 0    Cholecalciferol (Vitamin D3) 1 25 MG (86998 UT) CAPS, Take 1 capsule by mouth once a week, Disp: , Rfl:     diazepam (Valium) 5 mg tablet, Take 1 tablet (5 mg total) by mouth every 8 (eight) hours as needed for muscle spasms (Patient not taking: Reported on 10/25/2021), Disp: 12 tablet, Rfl: 0    gabapentin (NEURONTIN) 100 mg capsule, Take 1 capsule by mouth at bedtime, Disp: 30 capsule, Rfl: 0    hydrALAZINE (APRESOLINE) 25 mg tablet, Take 3 tablets (75 mg total) by mouth every 8 (eight) hours, Disp: 270 tablet, Rfl: 0    lidocaine (LIDODERM) 5 %, Apply 1 patch topically daily Remove & Discard patch within 12 hours or as directed by MD (Patient not taking: Reported on 10/25/2021), Disp: 30 patch, Rfl: 0    NIFEdipine (PROCARDIA XL) 90 mg 24 hr tablet, Take 90 mg by mouth daily, Disp: , Rfl:     sevelamer (RENAGEL) 800 mg tablet, TAKE 3 TABLETS BY MOUTH THREE TIMES DAILY WITH MEALS, Disp: , Rfl:     sildenafil (VIAGRA) 100 mg tablet, , Disp: , Rfl:     REVIEW OF SYSTEMS     Review of Systems   Constitutional: Negative  HENT: Negative  Eyes: Negative  Respiratory: Positive for shortness of breath  Cardiovascular: Negative  Gastrointestinal: Negative  Endocrine: Negative  Genitourinary: Negative  Musculoskeletal: Negative  Skin: Negative  Allergic/Immunologic: Negative  Neurological: Negative  Hematological: Negative  All other systems reviewed and are negative  OBJECTIVE     Current Weight:    Vitals:    01/01/22 1252   BP: (!) 174/94   Pulse: 83   Resp: 16   Temp:    SpO2: 94%     There is no height or weight on file to calculate BMI  No intake or output data in the 24 hours ending 01/01/22 1303    PHYSICAL EXAMINATION     Physical Exam  HENT:      Head: Normocephalic and atraumatic  Eyes:      Pupils: Pupils are equal, round, and reactive to light  Neck:      Vascular: No JVD  Cardiovascular:      Rate and Rhythm: Normal rate and regular rhythm  Heart sounds: Normal heart sounds  No murmur heard  No friction rub  Pulmonary:      Effort: Pulmonary effort is normal       Breath sounds: Rales present  Abdominal:      General: Bowel sounds are normal  There is no distension  Palpations: Abdomen is soft  Tenderness: There is no abdominal tenderness  There is no rebound  Musculoskeletal:         General: No tenderness  Cervical back: Neck supple  Skin:     General: Skin is dry  Findings: No rash  Neurological:      Mental Status: He is alert and oriented to person, place, and time             LAB RESULTS        Results from last 7 days   Lab Units 01/01/22  1126   WBC Thousand/uL 6 56   HEMOGLOBIN g/dL 11 0*   HEMATOCRIT % 35 4*   PLATELETS Thousands/uL 201   POTASSIUM mmol/L 5 8*   CHLORIDE mmol/L 98*   CO2 mmol/L 27   BUN mg/dL 73*   CREATININE mg/dL 12 67*   EGFR ml/min/1 73sq m 4   CALCIUM mg/dL 7 4*         RADIOLOGY RESULTS       Narrative  CHEST    INDICATION:   sob  Multiple negative Covid PCR tests with the most recent performed on April 13, 2021  COMPARISON:  Chest x-ray from February 14, 2021  EXAM PERFORMED/VIEWS:  XR CHEST PORTABLE      FINDINGS:    Mild cardiomegaly  Stable single lead AICD device with the cardiac generator in the left upper chest and the intact defibrillator lead tip in the right ventricle  Increased interstitial markings and prominence of the hilar shadows consistent with mild CHF  No pleural effusion, consolidation or pneumothorax  Osseous structures are unremarkable  Impression  Cardiomegaly and mild pulmonary edema  PLAN / RECOMMENDATIONS      80-year-old male with past medical history of ESRD on hemodialysis, hypertension who presents to ED with shortness of breath  1  ESRD on hemodialysis:  Undergoes hemodialysis on Tuesdays, Thursdays and Saturday at the [facility]   -last session of dialysis was on Thursday   -plan session of dialysis today for 3 hours with target UF of 3 5 L   -patient will undergo hemodialysis at his outpatient unit in a m  2  Hyperkalemia:  Serum potassium of 5 8 noted  -will dialyze patient with 2 K bath  3  Hypertension due to ESRD:  Blood pressure is elevated 174/94  Expect volume removal to help with hypertension  4  Anemia due to ESRD:  Hemoglobin 11 0 and on target  Discharge home after HD  Thank you for the consultation to participate in patient's care  I have personally discussed my plan with the referring physician       Esther Pena MD    1/1/2022

## 2022-01-01 NOTE — ED PROVIDER NOTES
History  Chief Complaint   Patient presents with    Breathing Difficulty     Patient states "I feel like I cant catch my breath that started wedenesday " Patient reports symptoms worsen after dialysis  HPI patient is a 41-year-old male, reports that since Wednesday he has had sense like he could not catch his breath  Patient reports he needs to sit up and feels like he needs to move around he just can seem to be able to feel comfortable with his breathing  Patient apparently had a COVID test when he had dialysis on Thursday  He reports normal dialysis  Patient denies any loss of consciousness  He reports the anterior chest soreness  Denies any fever or chills  Denies any vomiting  Past medical history of renal failure on dialysis hypertension  Family history noncontributory  Social history, here with his wife, nonsmoker    Prior to Admission Medications   Prescriptions Last Dose Informant Patient Reported? Taking?    Calcium Carbonate Antacid 1250 mg/5 mL  Self No No   Sig: Take 5 mL (1,250 mg total) by mouth 3 (three) times a day with meals   Cholecalciferol (Vitamin D3) 1 25 MG (94226 UT) CAPS  Self Yes No   Sig: Take 1 capsule by mouth once a week   NIFEdipine (PROCARDIA XL) 90 mg 24 hr tablet  Self Yes No   Sig: Take 90 mg by mouth daily   calcitriol (ROCALTROL) 0 5 MCG capsule  Self No No   Sig: Take 2 capsules (1 mcg total) by mouth daily   Patient not taking: Reported on 10/25/2021   carvedilol (COREG) 25 mg tablet  Self No No   Sig: Take 1 tablet (25 mg total) by mouth 2 (two) times a day with meals   diazepam (Valium) 5 mg tablet  Self No No   Sig: Take 1 tablet (5 mg total) by mouth every 8 (eight) hours as needed for muscle spasms   Patient not taking: Reported on 10/25/2021   gabapentin (NEURONTIN) 100 mg capsule   No No   Sig: Take 1 capsule by mouth at bedtime   hydrALAZINE (APRESOLINE) 25 mg tablet  Self No No   Sig: Take 3 tablets (75 mg total) by mouth every 8 (eight) hours   lidocaine (LIDODERM) 5 %  Self No No   Sig: Apply 1 patch topically daily Remove & Discard patch within 12 hours or as directed by MD   Patient not taking: Reported on 10/25/2021   sevelamer (RENAGEL) 800 mg tablet  Self Yes No   Sig: TAKE 3 TABLETS BY MOUTH THREE TIMES DAILY WITH MEALS   sildenafil (VIAGRA) 100 mg tablet  Self Yes No      Facility-Administered Medications: None       Past Medical History:   Diagnosis Date    Chronic kidney disease     Hypertension     Pressure injury of skin     Renal disorder     dialysis        Past Surgical History:   Procedure Laterality Date    CARDIAC DEFIBRILLATOR PLACEMENT      IR AV FISTULAGRAM/GRAFTOGRAM  2019    IR AV FISTULAGRAM/GRAFTOGRAM  10/22/2021    IR TUNNELED CENTRAL LINE PLACEMENT  10/29/2020    LA EXPLORE PARATHYROID GLANDS N/A 10/22/2020    Procedure: Three gland PARATHYROIDECTOMY, four gland exploration, intraoperative PTH monitoring;  Surgeon: Chris Arteaga MD;  Location: [facility];  Service: Surgical Oncology    SPLIT THICKNESS SKIN GRAFT Left 2020    Procedure: SKIN GRAFT SPLIT THICKNESS (STSG)  LEFT ARM;  Surgeon: Sandra Do MD;  Location: [facility];  Service: Plastics    WOUND DEBRIDEMENT Left 2020    Procedure: DEBRIDEMENT OF LEFT ARM WOUND;  Surgeon: Sandra Do MD;  Location: [facility];  Service: Plastics       History reviewed  No pertinent family history  I have reviewed and agree with the history as documented      E-Cigarette/Vaping    E-Cigarette Use Never User      E-Cigarette/Vaping Substances    Nicotine No     THC No     CBD No     Flavoring No     Other No     Unknown No      Social History     Tobacco Use    Smoking status: Former Smoker     Quit date: 6/15/2010     Years since quittin 5    Smokeless tobacco: Never Used   Vaping Use    Vaping Use: Never used   Substance Use Topics    Alcohol use: Not Currently    Drug use: Never       Review of Systems   Constitutional: Negative for diaphoresis, fatigue and fever  HENT: Negative for congestion, ear pain, nosebleeds and sore throat  Eyes: Negative for photophobia, pain, discharge and visual disturbance  Respiratory: Positive for shortness of breath  Negative for cough, choking, chest tightness and wheezing  Cardiovascular: Negative for chest pain and palpitations  Gastrointestinal: Negative for abdominal distention, abdominal pain, diarrhea and vomiting  Genitourinary: Negative for dysuria, flank pain and frequency  Musculoskeletal: Negative for back pain, gait problem and joint swelling  Skin: Negative for color change and rash  Neurological: Negative for dizziness, syncope and headaches  Psychiatric/Behavioral: Negative for behavioral problems and confusion  The patient is not nervous/anxious  All other systems reviewed and are negative  Physical Exam  Physical Exam  Vitals and nursing note reviewed  Constitutional:       Appearance: He is well-developed  HENT:      Head: Normocephalic  Right Ear: External ear normal       Left Ear: External ear normal       Nose: Nose normal    Eyes:      General: Lids are normal       Pupils: Pupils are equal, round, and reactive to light  Cardiovascular:      Rate and Rhythm: Normal rate and regular rhythm  Pulses: Normal pulses  Heart sounds: Normal heart sounds  Pulmonary:      Effort: Pulmonary effort is normal  No respiratory distress  Breath sounds: Normal breath sounds  Abdominal:      General: Abdomen is flat  Bowel sounds are normal       Tenderness: There is no abdominal tenderness  Musculoskeletal:         General: No deformity  Normal range of motion  Cervical back: Normal range of motion and neck supple  Skin:     General: Skin is warm and dry  Neurological:      Mental Status: He is alert and oriented to person, place, and time           Vital Signs  ED Triage Vitals [01/01/22 0931]   Temperature Pulse Respirations Blood Pressure SpO2   97 8 °F (36 6 °C) 86 22 157/87 96 %      Temp Source Heart Rate Source Patient Position - Orthostatic VS BP Location FiO2 (%)   Temporal Monitor Sitting Left arm --      Pain Score       --           Vitals:    01/01/22 1430 01/01/22 1500 01/01/22 1530 01/01/22 1630   BP: (!) 197/107 (!) 196/101 (!) 192/97 (!) 181/114   Pulse: 86 84 80 80   Patient Position - Orthostatic VS:             Visual Acuity      ED Medications  Medications - No data to display    Diagnostic Studies  Results Reviewed     Procedure Component Value Units Date/Time    COVID/FLU/RSV - 2 hour TAT [[id number]]  (Normal) Collected: 01/01/22 1130    Lab Status: Final result Specimen: Nares from Nasopharyngeal Swab Updated: 01/01/22 1234     SARS-CoV-2 Negative     INFLUENZA A PCR Negative     INFLUENZA B PCR Negative     RSV PCR Negative    Narrative:      FOR PEDIATRIC PATIENTS - copy/paste COVID Guidelines URL to browser: [email or web address]  What's Hot     This test has been authorized by FDA under an EUA (Emergency Use Assay) for use by authorized laboratories  Clinical caution and judgement should be used with the interpretation of these results with consideration of the clinical impression and other laboratory testing  Testing reported as "Positive" or "Negative" has been proven to be accurate according to standard laboratory validation requirements  All testing is performed with control materials showing appropriate reactivity at standard intervals      HS Troponin I 4hr [[id number]]     Lab Status: No result Specimen: Blood     HS Troponin 0hr (reflex protocol) [[id number]]  (Normal) Collected: 01/01/22 1126    Lab Status: Final result Specimen: Blood from Arm, Left Updated: 01/01/22 1156     hs TnI 0hr 11 ng/L     HS Troponin I 2hr [[id number]]     Lab Status: No result Specimen: Blood     Basic metabolic panel [[id number]]  (Abnormal) Collected: 01/01/22 1126    Lab Status: Final result Specimen: Blood from Arm, Left Updated: 01/01/22 1143     Sodium 139 mmol/L      Potassium 5 8 mmol/L      Chloride 98 mmol/L      CO2 27 mmol/L      ANION GAP 14 mmol/L      BUN 73 mg/dL      Creatinine 12 67 mg/dL      Glucose 93 mg/dL      Calcium 7 4 mg/dL      eGFR 4 ml/min/1 73sq m     Narrative:      National Kidney Disease Foundation guidelines for Chronic Kidney Disease (CKD):     Stage 1 with normal or high GFR (GFR > 90 mL/min/1 73 square meters)    Stage 2 Mild CKD (GFR = 60-89 mL/min/1 73 square meters)    Stage 3A Moderate CKD (GFR = 45-59 mL/min/1 73 square meters)    Stage 3B Moderate CKD (GFR = 30-44 mL/min/1 73 square meters)    Stage 4 Severe CKD (GFR = 15-29 mL/min/1 73 square meters)    Stage 5 End Stage CKD (GFR <15 mL/min/1 73 square meters)  Note: GFR calculation is accurate only with a steady state creatinine    Protime-INR [[id number]]  (Normal) Collected: 01/01/22 1126    Lab Status: Final result Specimen: Blood from Arm, Left Updated: 01/01/22 1142     Protime 14 3 seconds      INR 1 15    CBC and differential [[id number]]  (Abnormal) Collected: 01/01/22 1126    Lab Status: Final result Specimen: Blood from Arm, Left Updated: 01/01/22 1132     WBC 6 56 Thousand/uL      RBC 3 97 Million/uL      Hemoglobin 11 0 g/dL      Hematocrit 35 4 %      MCV 89 fL      MCH 27 7 pg      MCHC 31 1 g/dL      RDW 15 1 %      MPV 10 1 fL      Platelets 500 Thousands/uL      nRBC 0 /100 WBCs      Neutrophils Relative 62 %      Immat GRANS % 0 %      Lymphocytes Relative 27 %      Monocytes Relative 6 %      Eosinophils Relative 4 %      Basophils Relative 1 %      Neutrophils Absolute 4 03 Thousands/µL      Immature Grans Absolute 0 02 Thousand/uL      Lymphocytes Absolute 1 78 Thousands/µL      Monocytes Absolute 0 40 Thousand/µL      Eosinophils Absolute 0 28 Thousand/µL      Basophils Absolute 0 05 Thousands/µL                  XR chest 1 view portable   Final Result by Melani Koenig MD (01/01 1403)      Findings consistent with pulmonary congestion as above  Findings concur with the referring clinician's preliminary interpretation already in the patient's electronic health record  Workstation performed: [id number]                    Procedures  ECG 12 Lead Documentation Only    Date/Time: 1/1/2022 11:52 AM  Performed by: Nadia Jenkins MD  Authorized by: Nadia Jenkins MD     Indications / Diagnosis:  Shortness of breath  ECG reviewed by me, the ED Provider: yes    Patient location:  ED  Previous ECG:     Previous ECG:  Unavailable  Interpretation:     Interpretation: normal    Rate:     ECG rate:  Eighty-seven    ECG rate assessment: normal    Rhythm:     Rhythm: sinus rhythm    Comments:      Normal sinus rhythm, prolonged QT no acute ST-T wave changes             ED Course        pulse oximetry 94- 96% on room air, adequate oxygenation    Chest x-ray showed volume overload, fluid in the minor fissure, no pneumothorax, interpreted by me I was initial   other diagnostic tests showed negative COVID testing  Cardiac troponin was 11 not consistent with ischemic heart disease even despite the fact the patient has poor renal clearance did his renal failure  Electrolytes showed a potassium of 5 8 consistent with hyperkalemia, BUN is 73 creatinine at 12 6 consistent with patient's renal failure  I spoke with Nephrology covering, they report the patient can have dialysis here for a short period time to try and relieve some of his volume overload  They reviewed the chest x-ray and agree the patient has volume overload  SBIRT 22yo+      Most Recent Value   SBIRT (24 yo +)    In order to provide better care to our patients, we are screening all of our patients for alcohol and drug use  Would it be okay to ask you these screening questions? Yes Filed at: 01/01/2022 1132   Initial Alcohol Screen: US AUDIT-C     1   How often do you have a drink containing alcohol? 0 Filed at: 01/01/2022 1132   2  How many drinks containing alcohol do you have on a typical day you are drinking? 0 Filed at: 01/01/2022 1132   3a  Male UNDER 65: How often do you have five or more drinks on one occasion? 0 Filed at: 01/01/2022 1132   3b  FEMALE Any Age, or MALE 65+: How often do you have 4 or more drinks on one occassion? 0 Filed at: 01/01/2022 1132   Audit-C Score 0 Filed at: 01/01/2022 1132   GABY: How many times in the past year have you    Used an illegal drug or used a prescription medication for non-medical reasons? Never Filed at: 01/01/2022 1132           patient returned after a 2 hour dialysis session with marked improvement of his shortness of breath  Patient was able to go home          MDM medical decision making 69-year-old male, history of renal failure on dialysis, presents emergency department since last night difficulty breathing reports feeling like he can not catch his breath  Patient had negative COVID testing  Chest x-ray was consistent with volume overload  I spoke with Nephrology they agree the chest x-ray was consistent with volume overload  Patient had dialysis here for 2 hours and now is markedly improved  Discussed outpatient treatment follow-up  discussed indications to return  Disposition  Final diagnoses:   Volume overload   Dyspnea     Time reflects when diagnosis was documented in both MDM as applicable and the Disposition within this note     Time User Action Codes Description Comment    1/1/2022 12:57 PM Raghu Patrick Add [E87 70] Volume overload     1/1/2022  4:45 PM Raghu Patrick Add [R06 00] Dyspnea       ED Disposition     ED Disposition Condition Date/Time Comment    Discharge Stable Sat Jan 1, 2022  4:45 PM Kaylyn Ewing discharge to home/self care              Follow-up Information    None         Discharge Medication List as of 1/1/2022  4:45 PM      CONTINUE these medications which have NOT CHANGED    Details   calcitriol (ROCALTROL) 0  5 MCG capsule Take 2 capsules (1 mcg total) by mouth daily, Starting Fri 10/30/2020, Normal      Calcium Carbonate Antacid 1250 mg/5 mL Take 5 mL (1,250 mg total) by mouth 3 (three) times a day with meals, Starting Thu 2/18/2021, Until Mon 10/25/2021, Normal      carvedilol (COREG) 25 mg tablet Take 1 tablet (25 mg total) by mouth 2 (two) times a day with meals, Starting Mon 7/6/2020, Until Mon 10/25/2021, Normal      Cholecalciferol (Vitamin D3) 1 25 MG (49081 UT) CAPS Take 1 capsule by mouth once a week, Starting Mon 8/2/2021, Historical Med      diazepam (Valium) 5 mg tablet Take 1 tablet (5 mg total) by mouth every 8 (eight) hours as needed for muscle spasms, Starting Tue 1/26/2021, Print      gabapentin (NEURONTIN) 100 mg capsule Take 1 capsule by mouth at bedtime, Normal      hydrALAZINE (APRESOLINE) 25 mg tablet Take 3 tablets (75 mg total) by mouth every 8 (eight) hours, Starting Mon 7/6/2020, Until Mon 10/25/2021, Normal      lidocaine (LIDODERM) 5 % Apply 1 patch topically daily Remove & Discard patch within 12 hours or as directed by MD, Starting Wed 1/27/2021, Normal      NIFEdipine (PROCARDIA XL) 90 mg 24 hr tablet Take 90 mg by mouth daily, Starting Tue 5/5/2020, Until Mon 10/25/2021, Historical Med      sevelamer (RENAGEL) 800 mg tablet TAKE 3 TABLETS BY MOUTH THREE TIMES DAILY WITH MEALS, Historical Med      sildenafil (VIAGRA) 100 mg tablet Starting Fri 10/1/2021, Historical Med             No discharge procedures on file      PDMP Review       Value Time User    PDMP Reviewed  Yes 2/16/2021  8:34 AM Karena Costa MD          ED Provider  Electronically Signed by           Celina Head MD  01/01/22 022

## 2022-01-01 NOTE — PLAN OF CARE
Post-Dialysis RN Treatment Note    Blood Pressure:  Pre 197/107mm/Hg  Post 181/114 mmHg   EDW  109 kg    Weight:  Pre 115 9 kg   Post 113 2 kg   Mode of weight measurement: Standing Scale   Volume Removed  3250 ml    Treatment duration 120 minutes    NS given  No    Treatment shortened?  Yes, describe: due to scheduling   Medications given during Rx Not Applicable   Estimated Kt/V  Not Applicable   Access type: AV fistula   Access Issues: No    Report called to primary nurse   Yes/  Ted Hilliard RN      4000 ml as tolerated, 3 hrs, 2K  Problem: METABOLIC, FLUID AND ELECTROLYTES - ADULT  Goal: Electrolytes maintained within normal limits  Description: INTERVENTIONS:  - Monitor labs and assess patient for signs and symptoms of electrolyte imbalances  - Administer electrolyte replacement as ordered  - Monitor response to electrolyte replacements, including repeat lab results as appropriate  - Instruct patient on fluid and nutrition as appropriate  Outcome: Progressing  Goal: Fluid balance maintained  Description: INTERVENTIONS:  - Monitor labs   - Monitor I/O and WT  - Instruct patient on fluid and nutrition as appropriate  - Assess for signs & symptoms of volume excess or deficit  Outcome: Progressing

## 2022-01-01 NOTE — ED NOTES
Patient is discharged to home at this time  VSS  IV removed by patient  Patient awaiting ride now  AVS given and patient expressed understanding       Xochilt Hays RN  01/01/22 0051

## 2022-04-25 ENCOUNTER — HOSPITAL ENCOUNTER (OUTPATIENT)
Dept: CT IMAGING | Facility: CLINIC | Age: 47
Discharge: HOME | End: 2022-04-25
Payer: COMMERCIAL

## 2022-04-25 DIAGNOSIS — H47.11 PAPILLEDEMA ASSOCIATED WITH INCREASED INTRACRANIAL PRESSURE: ICD-10-CM

## 2022-04-25 PROCEDURE — G1004 CDSM NDSC: HCPCS

## 2022-04-25 PROCEDURE — 70470 CT HEAD/BRAIN W/O & W/DYE: CPT

## 2022-04-25 RX ADMIN — IOHEXOL 85 ML: 350 INJECTION, SOLUTION INTRAVENOUS at 10:57

## 2023-01-19 DIAGNOSIS — N18.6 HYPERTENSIVE CKD, ESRD ON DIALYSIS (HCC): Primary | ICD-10-CM

## 2023-01-19 DIAGNOSIS — I50.9 CHF (CONGESTIVE HEART FAILURE) (HCC): ICD-10-CM

## 2023-01-19 DIAGNOSIS — I12.0 HYPERTENSIVE CKD, ESRD ON DIALYSIS (HCC): Primary | ICD-10-CM

## 2023-01-19 DIAGNOSIS — Z99.2 HYPERTENSIVE CKD, ESRD ON DIALYSIS (HCC): Primary | ICD-10-CM

## 2023-01-19 RX ORDER — CARVEDILOL 25 MG/1
25 TABLET ORAL 2 TIMES DAILY WITH MEALS
Qty: 180 TABLET | Refills: 2 | Status: SHIPPED | OUTPATIENT
Start: 2023-01-19 | End: 2023-04-19

## 2023-01-19 RX ORDER — NIFEDIPINE 90 MG/1
90 TABLET, EXTENDED RELEASE ORAL DAILY
Qty: 90 TABLET | Refills: 2 | Status: SHIPPED | OUTPATIENT
Start: 2023-01-19 | End: 2024-07-10

## 2024-02-21 PROBLEM — J18.9 COMMUNITY ACQUIRED PNEUMONIA: Status: RESOLVED | Noted: 2020-07-04 | Resolved: 2024-02-21

## 2024-08-01 ENCOUNTER — APPOINTMENT (EMERGENCY)
Dept: CT IMAGING | Facility: HOSPITAL | Age: 49
End: 2024-08-01
Payer: COMMERCIAL

## 2024-08-01 ENCOUNTER — HOSPITAL ENCOUNTER (EMERGENCY)
Facility: HOSPITAL | Age: 49
Discharge: HOME | End: 2024-08-02
Attending: EMERGENCY MEDICINE
Payer: COMMERCIAL

## 2024-08-01 ENCOUNTER — APPOINTMENT (EMERGENCY)
Dept: VASCULAR ULTRASOUND | Facility: HOSPITAL | Age: 49
End: 2024-08-01
Payer: COMMERCIAL

## 2024-08-01 ENCOUNTER — APPOINTMENT (EMERGENCY)
Dept: RADIOLOGY | Facility: HOSPITAL | Age: 49
End: 2024-08-01
Payer: COMMERCIAL

## 2024-08-01 VITALS
HEIGHT: 70 IN | TEMPERATURE: 98 F | RESPIRATION RATE: 18 BRPM | DIASTOLIC BLOOD PRESSURE: 78 MMHG | BODY MASS INDEX: 32.21 KG/M2 | HEART RATE: 103 BPM | SYSTOLIC BLOOD PRESSURE: 135 MMHG | WEIGHT: 225 LBS | OXYGEN SATURATION: 98 %

## 2024-08-01 DIAGNOSIS — M79.5 RETAINED BULLET: Primary | ICD-10-CM

## 2024-08-01 LAB
ALBUMIN SERPL BCG-MCNC: 4.4 G/DL (ref 3.5–5)
ALP SERPL-CCNC: 138 U/L (ref 34–104)
ALT SERPL W P-5'-P-CCNC: 20 U/L (ref 7–52)
ANION GAP SERPL CALCULATED.3IONS-SCNC: 7 MMOL/L (ref 4–13)
AST SERPL W P-5'-P-CCNC: 13 U/L (ref 13–39)
BASOPHILS # BLD AUTO: 0.02 THOUSANDS/ÂΜL (ref 0–0.1)
BASOPHILS NFR BLD AUTO: 0 % (ref 0–1)
BILIRUB SERPL-MCNC: 0.34 MG/DL (ref 0.2–1)
BUN SERPL-MCNC: 18 MG/DL (ref 5–25)
CALCIUM SERPL-MCNC: 10.1 MG/DL (ref 8.4–10.2)
CHLORIDE SERPL-SCNC: 108 MMOL/L (ref 96–108)
CO2 SERPL-SCNC: 21 MMOL/L (ref 21–32)
CREAT SERPL-MCNC: 1.42 MG/DL (ref 0.6–1.3)
EOSINOPHIL # BLD AUTO: 0.12 THOUSAND/ÂΜL (ref 0–0.61)
EOSINOPHIL NFR BLD AUTO: 2 % (ref 0–6)
ERYTHROCYTE [DISTWIDTH] IN BLOOD BY AUTOMATED COUNT: 14.6 % (ref 11.6–15.1)
GFR SERPL CREATININE-BSD FRML MDRD: 57 ML/MIN/1.73SQ M
GLUCOSE SERPL-MCNC: 143 MG/DL (ref 65–140)
HCT VFR BLD AUTO: 37.2 % (ref 36.5–49.3)
HGB BLD-MCNC: 11.8 G/DL (ref 12–17)
IMM GRANULOCYTES # BLD AUTO: 0.02 THOUSAND/UL (ref 0–0.2)
IMM GRANULOCYTES NFR BLD AUTO: 0 % (ref 0–2)
LYMPHOCYTES # BLD AUTO: 0.64 THOUSANDS/ÂΜL (ref 0.6–4.47)
LYMPHOCYTES NFR BLD AUTO: 12 % (ref 14–44)
MCH RBC QN AUTO: 25.7 PG (ref 26.8–34.3)
MCHC RBC AUTO-ENTMCNC: 31.7 G/DL (ref 31.4–37.4)
MCV RBC AUTO: 81 FL (ref 82–98)
MONOCYTES # BLD AUTO: 0.51 THOUSAND/ÂΜL (ref 0.17–1.22)
MONOCYTES NFR BLD AUTO: 9 % (ref 4–12)
NEUTROPHILS # BLD AUTO: 4.16 THOUSANDS/ÂΜL (ref 1.85–7.62)
NEUTS SEG NFR BLD AUTO: 77 % (ref 43–75)
NRBC BLD AUTO-RTO: 0 /100 WBCS
PLATELET # BLD AUTO: 180 THOUSANDS/UL (ref 149–390)
PMV BLD AUTO: 10 FL (ref 8.9–12.7)
POTASSIUM SERPL-SCNC: 4 MMOL/L (ref 3.5–5.3)
PROT SERPL-MCNC: 7.3 G/DL (ref 6.4–8.4)
RBC # BLD AUTO: 4.59 MILLION/UL (ref 3.88–5.62)
SODIUM SERPL-SCNC: 136 MMOL/L (ref 135–147)
WBC # BLD AUTO: 5.47 THOUSAND/UL (ref 4.31–10.16)

## 2024-08-01 PROCEDURE — 96372 THER/PROPH/DIAG INJ SC/IM: CPT

## 2024-08-01 PROCEDURE — 93971 EXTREMITY STUDY: CPT

## 2024-08-01 PROCEDURE — 36415 COLL VENOUS BLD VENIPUNCTURE: CPT | Performed by: EMERGENCY MEDICINE

## 2024-08-01 PROCEDURE — 73564 X-RAY EXAM KNEE 4 OR MORE: CPT

## 2024-08-01 PROCEDURE — 73701 CT LOWER EXTREMITY W/DYE: CPT

## 2024-08-01 PROCEDURE — 99285 EMERGENCY DEPT VISIT HI MDM: CPT | Performed by: EMERGENCY MEDICINE

## 2024-08-01 PROCEDURE — 99284 EMERGENCY DEPT VISIT MOD MDM: CPT

## 2024-08-01 PROCEDURE — 85025 COMPLETE CBC W/AUTO DIFF WBC: CPT | Performed by: EMERGENCY MEDICINE

## 2024-08-01 PROCEDURE — 80053 COMPREHEN METABOLIC PANEL: CPT | Performed by: EMERGENCY MEDICINE

## 2024-08-01 RX ORDER — KETOROLAC TROMETHAMINE 30 MG/ML
15 INJECTION, SOLUTION INTRAMUSCULAR; INTRAVENOUS ONCE
Status: DISCONTINUED | OUTPATIENT
Start: 2024-08-01 | End: 2024-08-01

## 2024-08-01 RX ORDER — IBUPROFEN 800 MG/1
800 TABLET ORAL 3 TIMES DAILY
Qty: 21 TABLET | Refills: 0 | Status: SHIPPED | OUTPATIENT
Start: 2024-08-01

## 2024-08-01 RX ADMIN — KETOROLAC TROMETHAMINE 15 MG: 30 INJECTION, SOLUTION INTRAMUSCULAR at 20:43

## 2024-08-01 RX ADMIN — IOHEXOL 100 ML: 350 INJECTION, SOLUTION INTRAVENOUS at 23:44

## 2024-08-02 PROCEDURE — 93971 EXTREMITY STUDY: CPT | Performed by: SURGERY
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
## 2024-08-02 NOTE — ED NOTES
Report received from previous shift. Pt is resting at this time.     Fawn Gaines, JOSE ALBERTO  08/01/24 5503

## 2025-04-17 ENCOUNTER — TELEPHONE (OUTPATIENT)
Age: 50
End: 2025-04-17

## (undated) DEVICE — 3000CC GUARDIAN II: Brand: GUARDIAN

## (undated) DEVICE — CHLORAPREP HI-LITE 10.5ML ORANGE

## (undated) DEVICE — GLOVE SRG BIOGEL 6.5

## (undated) DEVICE — 3M™ STERI-STRIP™ COMPOUND BENZOIN TINCTURE 40 BAGS/CARTON 4 CARTONS/CASE C1544: Brand: 3M™ STERI-STRIP™

## (undated) DEVICE — 3M™ STERI-STRIP™ REINFORCED ADHESIVE SKIN CLOSURES, R1547, 1/2 IN X 4 IN (12 MM X 100 MM), 6 STRIPS/ENVELOPE: Brand: 3M™ STERI-STRIP™

## (undated) DEVICE — SPONGE LAP 18 X 18 IN

## (undated) DEVICE — NEEDLE 25G X 1 1/2

## (undated) DEVICE — LIGACLIP MCA MULTIPLE CLIP APPLIERS, 20 SMALL CLIPS: Brand: LIGACLIP

## (undated) DEVICE — MINOR PROCEDURE DRAPE: Brand: CONVERTORS

## (undated) DEVICE — STOCKINETTE REGULAR

## (undated) DEVICE — SPONGE STICK WITH PVP-I: Brand: KENDALL

## (undated) DEVICE — PROXIMATE SKIN STAPLERS (35 WIDE) CONTAINS 35 STAINLESS STEEL STAPLES (FIXED HEAD): Brand: PROXIMATE

## (undated) DEVICE — SUT VICRYL 4-0 PS-2 27 IN J426H

## (undated) DEVICE — ELECTRODE BLADE E-Z CLEAN 4IN -0014A

## (undated) DEVICE — DRAPE SURGIKIT SADDLE BAG

## (undated) DEVICE — ACE WRAP 4 IN UNSTERILE

## (undated) DEVICE — GLOVE SRG BIOGEL ECLIPSE 7

## (undated) DEVICE — GAUZE SPONGES,16 PLY: Brand: CURITY

## (undated) DEVICE — DERMATOME BLADES: Brand: DERMATOME

## (undated) DEVICE — PLUMEPEN PRO 10FT

## (undated) DEVICE — MEDI-VAC NON-CONDUCTIVE SUCTION TUBING 6MM X 1.8M (6FT.) L: Brand: CARDINAL HEALTH

## (undated) DEVICE — BETHLEHEM UNIVERSAL OUTPATIENT: Brand: CARDINAL HEALTH

## (undated) DEVICE — DRESSING, ALGINATE, MAXORB II, 4"X4": Brand: MEDLINE INDUSTRIES, INC.

## (undated) DEVICE — INTENDED FOR TISSUE SEPARATION, AND OTHER PROCEDURES THAT REQUIRE A SHARP SURGICAL BLADE TO PUNCTURE OR CUT.: Brand: BARD-PARKER SAFETY BLADES SIZE 10, STERILE

## (undated) DEVICE — ELECTRODE BLADE MOD E-Z CLEAN  2.75IN 7CM -0012AM

## (undated) DEVICE — BULB SYRINGE,IRRIGATION WITH PROTECTIVE CAP: Brand: DOVER

## (undated) DEVICE — COTTON BALLS: Brand: DEROYAL

## (undated) DEVICE — KERLIX BANDAGE ROLL: Brand: KERLIX

## (undated) DEVICE — GLOVE INDICATOR PI UNDERGLOVE SZ 7 BLUE

## (undated) DEVICE — INTENDED FOR TISSUE SEPARATION, AND OTHER PROCEDURES THAT REQUIRE A SHARP SURGICAL BLADE TO PUNCTURE OR CUT.: Brand: BARD-PARKER ® CARBON RIB-BACK BLADES

## (undated) DEVICE — CURITY STRETCH BANDAGE: Brand: CURITY

## (undated) DEVICE — SUT MONOCRYL 5-0 P-3 18 IN Y493G

## (undated) DEVICE — ELECTRODE BLADE MOD E-Z CLEAN 2.5IN 6.4CM -0012M

## (undated) DEVICE — INTENDED FOR TISSUE SEPARATION, AND OTHER PROCEDURES THAT REQUIRE A SHARP SURGICAL BLADE TO PUNCTURE OR CUT.: Brand: BARD-PARKER SAFETY BLADES SIZE 15, STERILE

## (undated) DEVICE — CHLORAPREP HI-LITE 26ML ORANGE

## (undated) DEVICE — DRESSING XEROFORM 5 X 9

## (undated) DEVICE — SUT CHROMIC 3-0 SH 27 IN G122H

## (undated) DEVICE — SUT VICRYL 3-0 SH 27 IN J416H

## (undated) DEVICE — SURGICEL 4 X 8

## (undated) DEVICE — ABDOMINAL PAD: Brand: DERMACEA

## (undated) DEVICE — PACK UNIVERSAL NECK